# Patient Record
Sex: FEMALE | Race: BLACK OR AFRICAN AMERICAN | Employment: OTHER | URBAN - METROPOLITAN AREA
[De-identification: names, ages, dates, MRNs, and addresses within clinical notes are randomized per-mention and may not be internally consistent; named-entity substitution may affect disease eponyms.]

---

## 2017-01-21 ENCOUNTER — APPOINTMENT (OUTPATIENT)
Dept: GENERAL RADIOLOGY | Age: 77
End: 2017-01-21
Attending: EMERGENCY MEDICINE
Payer: MEDICARE

## 2017-01-21 ENCOUNTER — HOSPITAL ENCOUNTER (EMERGENCY)
Age: 77
Discharge: HOME | End: 2017-01-21
Attending: EMERGENCY MEDICINE
Payer: MEDICARE

## 2017-01-21 VITALS
HEIGHT: 63 IN | DIASTOLIC BLOOD PRESSURE: 64 MMHG | OXYGEN SATURATION: 99 % | TEMPERATURE: 99.3 F | BODY MASS INDEX: 32.78 KG/M2 | RESPIRATION RATE: 16 BRPM | WEIGHT: 185 LBS | HEART RATE: 90 BPM | SYSTOLIC BLOOD PRESSURE: 131 MMHG

## 2017-01-21 DIAGNOSIS — M25.511 PAIN IN JOINT OF RIGHT SHOULDER: Primary | ICD-10-CM

## 2017-01-21 DIAGNOSIS — Z87.39 H/O ACUTE GOUTY ARTHRITIS: ICD-10-CM

## 2017-01-21 PROCEDURE — 99283 EMERGENCY DEPT VISIT LOW MDM: CPT

## 2017-01-21 PROCEDURE — L3670 SO ACRO/CLAV CAN WEB PRE OTS: HCPCS

## 2017-01-21 PROCEDURE — 73030 X-RAY EXAM OF SHOULDER: CPT

## 2017-01-21 PROCEDURE — 74011250637 HC RX REV CODE- 250/637: Performed by: NURSE PRACTITIONER

## 2017-01-21 RX ORDER — COLCHICINE 0.6 MG/1
TABLET ORAL
Qty: 30 TAB | Refills: 0 | Status: SHIPPED | OUTPATIENT
Start: 2017-01-21 | End: 2018-09-29

## 2017-01-21 RX ORDER — HYDROCODONE BITARTRATE AND ACETAMINOPHEN 7.5; 325 MG/1; MG/1
1 TABLET ORAL
Status: COMPLETED | OUTPATIENT
Start: 2017-01-21 | End: 2017-01-21

## 2017-01-21 RX ORDER — HYDROCODONE BITARTRATE AND ACETAMINOPHEN 5; 325 MG/1; MG/1
1 TABLET ORAL
Qty: 12 TAB | Refills: 0 | Status: SHIPPED | OUTPATIENT
Start: 2017-01-21 | End: 2018-04-29

## 2017-01-21 RX ADMIN — HYDROCODONE BITARTRATE AND ACETAMINOPHEN 1 TABLET: 7.5; 325 TABLET ORAL at 11:15

## 2017-01-21 NOTE — DISCHARGE INSTRUCTIONS
Joint Pain: Care Instructions  Your Care Instructions  Many people have small aches and pains from overuse or injury to muscles and joints. Joint injuries often happen during sports or recreation, work tasks, or projects around the home. An overuse injury can happen when you put too much stress on a joint or when you do an activity that stresses the joint over and over, such as using the computer or rowing a boat. You can take action at home to help your muscles and joints get better. You should feel better in 1 to 2 weeks, but it can take 3 months or more to heal completely. Follow-up care is a key part of your treatment and safety. Be sure to make and go to all appointments, and call your doctor if you are having problems. It's also a good idea to know your test results and keep a list of the medicines you take. How can you care for yourself at home? · Do not put weight on the injured joint for at least a day or two. · For the first day or two after an injury, do not take hot showers or baths, and do not use hot packs. The heat could make swelling worse. · Put ice or a cold pack on the sore joint for 10 to 20 minutes at a time. Try to do this every 1 to 2 hours for the next 3 days (when you are awake) or until the swelling goes down. Put a thin cloth between the ice and your skin. · Wrap the injury in an elastic bandage. Do not wrap it too tightly because this can cause more swelling. · Prop up the sore joint on a pillow when you ice it or anytime you sit or lie down during the next 3 days. Try to keep it above the level of your heart. This will help reduce swelling. · Take an over-the-counter pain medicine, such as acetaminophen (Tylenol), ibuprofen (Advil, Motrin), or naproxen (Aleve). Read and follow all instructions on the label. · After 1 or 2 days of rest, begin moving the joint gently.  While the joint is still healing, you can begin to exercise using activities that do not strain or hurt the painful joint. When should you call for help? Call your doctor now or seek immediate medical care if:  · You have signs of infection, such as:  ¨ Increased pain, swelling, warmth, and redness. ¨ Red streaks leading from the joint. ¨ A fever. Watch closely for changes in your health, and be sure to contact your doctor if:  · Your movement or symptoms are not getting better after 1 to 2 weeks of home treatment. Where can you learn more? Go to http://marshall-tom.info/. Enter P205 in the search box to learn more about \"Joint Pain: Care Instructions. \"  Current as of: May 23, 2016  Content Version: 11.1  © 3316-9852 [employer]. Care instructions adapted under license by [employer] (which disclaims liability or warranty for this information). If you have questions about a medical condition or this instruction, always ask your healthcare professional. [place] [address] any warranty or liability for your use of this information. [employer] Activation    Thank you for requesting access to [employer]. Please follow the instructions below to securely access and download your online medical record. [employer] allows you to send messages to your doctor, view your test results, renew your prescriptions, schedule appointments, and more. How Do I Sign Up? 1. In your internet browser, go to [email or web address]  2. Click on the First Time User? Click Here link in the Sign In box. You will be redirect to the New Member Sign Up page. 3. Enter your [employer] Access Code exactly as it appears below. You will not need to use this code after youve completed the sign-up process. If you do not sign up before the expiration date, you must request a new code. [employer] Access Code: [email or web address]  Expires: 2017 11:48 AM (This is the date your [employer] access code will )    4.  Enter the last four digits of your Social Security Number (xxxx) and Date of Birth (consuelo/dd/yyyy) as indicated and click Submit. You will be taken to the next sign-up page. 5. Create a [employer] ID. This will be your [employer] login ID and cannot be changed, so think of one that is secure and easy to remember. 6. Create a [employer] password. You can change your password at any time. 7. Enter your Password Reset Question and Answer. This can be used at a later time if you forget your password. 8. Enter your e-mail address. You will receive e-mail notification when new information is available in [address] 9. Click Sign Up. You can now view and download portions of your medical record. 10. Click the Download Summary menu link to download a portable copy of your medical information. Additional Information    If you have questions, please visit the Frequently Asked Questions section of the [employer] website at [email or web address] Remember, [employer] is NOT to be used for urgent needs. For medical emergencies, dial 911. Gout: Care Instructions  Your Care Instructions  Gout is a form of arthritis caused by a buildup of uric acid crystals in a joint. It causes sudden attacks of pain, swelling, redness, and stiffness, usually in one joint, especially the big toe. Gout usually comes on without a cause. But it can be brought on by drinking alcohol (especially beer) or eating seafood and red meat. Taking certain medicines, such as diuretics or aspirin, also can bring on an attack of gout. Taking your medicines as prescribed and following up with your doctor regularly can help you avoid gout attacks in the future. Follow-up care is a key part of your treatment and safety. Be sure to make and go to all appointments, and call your doctor if you are having problems. Its also a good idea to know your test results and keep a list of the medicines you take. How can you care for yourself at home?   · If the joint is swollen, put ice or a cold pack on the area for 10 to 20 minutes at a time. Put a thin cloth between the ice and your skin. · Prop up the sore limb on a pillow when you ice it or anytime you sit or lie down during the next 3 days. Try to keep it above the level of your heart. This will help reduce swelling. · Rest sore joints. Avoid activities that put weight or strain on the joints for a few days. Take short rest breaks from your regular activities during the day. · Take your medicines exactly as prescribed. Call your doctor if you think you are having a problem with your medicine. · Take pain medicines exactly as directed. ¨ If the doctor gave you a prescription medicine for pain, take it as prescribed. ¨ If you are not taking a prescription pain medicine, ask your doctor if you can take an over-the-counter medicine. · Eat less seafood and red meat. · Check with your doctor before drinking alcohol. · Losing weight, if you are overweight, may help reduce attacks of gout. But do not go on a [employer]. \" Losing a lot of weight in a short amount of time can cause a gout attack. When should you call for help? Call your doctor now or seek immediate medical care if:  · You have a fever. · The joint is so painful you cannot use it. · You have sudden, unexplained swelling, redness, warmth, or severe pain in one or more joints. Watch closely for changes in your health, and be sure to contact your doctor if:  · You have joint pain. · Your symptoms get worse or are not improving after 2 or 3 days. Where can you learn more? Go to http://marshall-tom.info/. Enter K715 in the search box to learn more about \"Gout: Care Instructions. \"  Current as of: February 24, 2016  Content Version: 11.1  © 8912-1186 [employer]. Care instructions adapted under license by [employer] (which disclaims liability or warranty for this information).  If you have questions about a medical condition or this instruction, always ask your healthcare professional. [address] any warranty or liability for your use of this information.

## 2017-01-21 NOTE — ED PROVIDER NOTES
HPI Comments: Pt with history of gout and hypertension presents with chief complaint on non-traumatic pain to her right shoulder joint. Pt states that pain in left shoulder started about 4 days ago and no is also in right shoulder. Denies any recent or old trauma. Currently taking allopurinol and last treated with colchicine approximately one year ago. Pt had not taken BP meds this morning and took upon arrival to the ED. Patient is a 78 y.o. female presenting with shoulder pain. Shoulder Pain    Pertinent negatives include no numbness. Past Medical History:   Diagnosis Date    Diabetes ([address]     Hypercholesteremia     Hypertension        Past Surgical History:   Procedure Laterality Date    Hx orthopaedic  april 2013    Hx carpal tunnel release      Hx other surgical       left arm nerve surgery         Family History:   Problem Relation Age of Onset    Diabetes Other     Hypertension Other        Social History     Social History    Marital status:      Spouse name: N/A    Number of children: N/A    Years of education: N/A     Occupational History    Not on file. Social History Main Topics    Smoking status: Never Smoker    Smokeless tobacco: Not on file    Alcohol use No    Drug use: No    Sexual activity: Not on file     Other Topics Concern    Not on file     Social History Narrative         ALLERGIES: Review of patient's allergies indicates no known allergies. Review of Systems   Constitutional: Negative. Negative for chills and fever. HENT: Negative. Respiratory: Negative. Negative for cough, shortness of breath, wheezing and stridor. Cardiovascular: Negative for chest pain and palpitations. Gastrointestinal: Negative for abdominal pain. Musculoskeletal: Positive for arthralgias. Negative for joint swelling, neck pain and neck stiffness. Skin: Negative. Neurological: Negative. Negative for dizziness, weakness, numbness and headaches.    All other systems reviewed and are negative. Vitals:    01/21/17 1053   BP: (!) 192/169   Pulse: 90   Resp: 16   Temp: 99.3 °F (37.4 °C)   SpO2: 99%   Weight: 83.9 kg (185 lb)   Height: 5' 3\" (1.6 m)            Physical Exam   Constitutional: She is oriented to person, place, and time. She appears well-developed and well-nourished. HENT:   Head: Normocephalic and atraumatic. Eyes: EOM are normal. Pupils are equal, round, and reactive to light. Neck: Normal range of motion. Neck supple. Cardiovascular: Normal rate, regular rhythm and normal heart sounds. Pulmonary/Chest: Effort normal and breath sounds normal.   Musculoskeletal: She exhibits tenderness. She exhibits no edema or deformity. Anterior right shoulder tender to touch and painful with all ROM. No heat or redness. Neurological: She is alert and oriented to person, place, and time. She exhibits normal muscle tone. Coordination normal.   Skin: Skin is warm and dry. Psychiatric: She has a normal mood and affect. Her behavior is normal. Judgment and thought content normal.   Nursing note and vitals reviewed. MDM  Number of Diagnoses or Management Options  Diagnosis management comments: Reviewed xray with Dr. Bhavna Ramirez to be severe arthritis in the absence of no recent or past trauma. Due to pt's history of gout, will treat as flair with previously tolerated colchicine and low dose Norco and sling for comfort. Pt has appointment with PCP in 2 days. Amount and/or Complexity of Data Reviewed  Tests in the radiology section of CPT®: ordered and reviewed    Risk of Complications, Morbidity, and/or Mortality  Presenting problems: moderate  Diagnostic procedures: moderate  Management options: moderate    Patient Progress  Patient progress: improved    ED Course       Procedures                       Diagnosis:   1. Pain in joint of right shoulder    2.  H/O acute gouty arthritis          Disposition: home    Follow-up Information Follow up With Details Comments Contact Lynette Mckeon MD In 2 days  [facility] [address] [facility] EMERGENCY DEPT  If symptoms worsen [address]  [phone]          Patient's Medications   Start Taking    COLCHICINE 0.6 MG TABLET    Take 1 tablet by mouth Q1Hour for gout pain. NOT TO EXCEED 3 TABLETS IN 24 HOURS. HYDROCODONE-ACETAMINOPHEN (NORCO) 5-325 MG PER TABLET    Take 1 Tab by mouth every four (4) hours as needed for Pain. Max Daily Amount: 6 Tabs. Continue Taking    ASPIRIN 81 MG TABLET    Take 81 mg by mouth. ATORVASTATIN (LIPITOR) 40 MG TABLET    Take 40 mg by mouth daily. ESOMEPRAZOLE (NEXIUM) 40 MG CAPSULE    Take 40 mg by mouth daily. FEBUXOSTAT (ULORIC) 40 MG TAB TABLET    Take 40 mg by mouth daily. LISINOPRIL (PRINIVIL, ZESTRIL) 40 MG TABLET    Take 40 mg by mouth daily. METOPROLOL (LOPRESSOR) 100 MG TABLET    Take 100 mg by mouth two (2) times a day. TRAMADOL (ULTRAM) 50 MG TABLET    Take 50 mg by mouth every six (6) hours as needed. These Medications have changed    No medications on file   Stop Taking    COLCHICINE 0.6 MG TABLET    Take 1 Tab by mouth every two (2) hours as needed for Pain (stop if pain stops, you get diarrhea, and do not take more than 8 a day). HYDROCODONE-ACETAMINOPHEN (VICODIN) 5-500 MG PER TABLET    Take 1 Tab by mouth every four (4) hours as needed for Pain.

## 2018-04-27 ENCOUNTER — APPOINTMENT (OUTPATIENT)
Dept: MRI IMAGING | Age: 78
End: 2018-04-27
Attending: EMERGENCY MEDICINE
Payer: MEDICARE

## 2018-04-27 ENCOUNTER — HOSPITAL ENCOUNTER (OUTPATIENT)
Age: 78
Setting detail: OBSERVATION
LOS: 1 days | Discharge: HOME | End: 2018-04-29
Attending: EMERGENCY MEDICINE | Admitting: HOSPITALIST
Payer: MEDICARE

## 2018-04-27 ENCOUNTER — APPOINTMENT (OUTPATIENT)
Dept: GENERAL RADIOLOGY | Age: 78
End: 2018-04-27
Attending: EMERGENCY MEDICINE
Payer: MEDICARE

## 2018-04-27 ENCOUNTER — APPOINTMENT (OUTPATIENT)
Dept: CT IMAGING | Age: 78
End: 2018-04-27
Attending: EMERGENCY MEDICINE
Payer: MEDICARE

## 2018-04-27 DIAGNOSIS — F10.929 ALCOHOLIC INTOXICATION WITH COMPLICATION (HCC): Primary | ICD-10-CM

## 2018-04-27 PROBLEM — N17.9 AKI (ACUTE KIDNEY INJURY) (HCC): Status: ACTIVE | Noted: 2018-04-27

## 2018-04-27 PROBLEM — I10 HTN (HYPERTENSION): Status: ACTIVE | Noted: 2018-04-27

## 2018-04-27 PROBLEM — E78.5 HLD (HYPERLIPIDEMIA): Status: ACTIVE | Noted: 2018-04-27

## 2018-04-27 PROBLEM — E11.9 DM (DIABETES MELLITUS) (HCC): Status: ACTIVE | Noted: 2018-04-27

## 2018-04-27 PROBLEM — G45.9 TIA (TRANSIENT ISCHEMIC ATTACK): Status: ACTIVE | Noted: 2018-04-27

## 2018-04-27 LAB
ABO + RH BLD: NORMAL
ALBUMIN SERPL-MCNC: 3.6 G/DL (ref 3.4–5)
ALBUMIN/GLOB SERPL: 1.1 {RATIO} (ref 0.8–1.7)
ALP SERPL-CCNC: 61 U/L (ref 45–117)
ALT SERPL-CCNC: 31 U/L (ref 13–56)
AMMONIA PLAS-SCNC: 21 UMOL/L (ref 11–32)
AMPHET UR QL SCN: NEGATIVE
ANION GAP SERPL CALC-SCNC: 13 MMOL/L (ref 3–18)
APPEARANCE UR: CLEAR
ASPIRIN TEST, ASPIRN: 431 ARU (ref 620–672)
AST SERPL-CCNC: 37 U/L (ref 15–37)
BACTERIA URNS QL MICRO: ABNORMAL /HPF
BARBITURATES UR QL SCN: NEGATIVE
BENZODIAZ UR QL: NEGATIVE
BILIRUB SERPL-MCNC: 0.2 MG/DL (ref 0.2–1)
BILIRUB UR QL: NEGATIVE
BLOOD GROUP ANTIBODIES SERPL: NORMAL
BUN SERPL-MCNC: 40 MG/DL (ref 7–18)
BUN/CREAT SERPL: 19 (ref 12–20)
CALCIUM SERPL-MCNC: 8.6 MG/DL (ref 8.5–10.1)
CANNABINOIDS UR QL SCN: NEGATIVE
CHLORIDE SERPL-SCNC: 102 MMOL/L (ref 100–108)
CHOLEST SERPL-MCNC: 188 MG/DL
CO2 SERPL-SCNC: 26 MMOL/L (ref 21–32)
COCAINE UR QL SCN: NEGATIVE
COLOR UR: YELLOW
CREAT SERPL-MCNC: 2.16 MG/DL (ref 0.6–1.3)
EPITH CASTS URNS QL MICRO: ABNORMAL /LPF (ref 0–5)
ERYTHROCYTE [DISTWIDTH] IN BLOOD BY AUTOMATED COUNT: 14.3 % (ref 11.6–14.5)
ERYTHROCYTE [SEDIMENTATION RATE] IN BLOOD: 42 MM/HR (ref 0–30)
EST. AVERAGE GLUCOSE BLD GHB EST-MCNC: 111 MG/DL
ETHANOL SERPL-MCNC: 232 MG/DL (ref 0–3)
FIBRINOGEN PPP-MCNC: 422 MG/DL (ref 210–451)
GLOBULIN SER CALC-MCNC: 3.4 G/DL (ref 2–4)
GLUCOSE BLD STRIP.AUTO-MCNC: 67 MG/DL (ref 70–110)
GLUCOSE BLD STRIP.AUTO-MCNC: 79 MG/DL (ref 70–110)
GLUCOSE BLD STRIP.AUTO-MCNC: 96 MG/DL (ref 70–110)
GLUCOSE SERPL-MCNC: 96 MG/DL (ref 74–99)
GLUCOSE UR STRIP.AUTO-MCNC: NEGATIVE MG/DL
HBA1C MFR BLD: 5.5 % (ref 4.2–5.6)
HCT VFR BLD AUTO: 34.7 % (ref 35–45)
HDLC SERPL-MCNC: 66 MG/DL (ref 40–60)
HDLC SERPL: 2.8 {RATIO} (ref 0–5)
HDSCOM,HDSCOM: NORMAL
HGB BLD-MCNC: 11.5 G/DL (ref 12–16)
HGB UR QL STRIP: NEGATIVE
INR PPP: 1 (ref 0.8–1.2)
KETONES UR QL STRIP.AUTO: NEGATIVE MG/DL
LDLC SERPL CALC-MCNC: 98.8 MG/DL (ref 0–100)
LEUKOCYTE ESTERASE UR QL STRIP.AUTO: ABNORMAL
LIPID PROFILE,FLP: ABNORMAL
MAGNESIUM SERPL-MCNC: 2.4 MG/DL (ref 1.6–2.6)
MCH RBC QN AUTO: 32.3 PG (ref 24–34)
MCHC RBC AUTO-ENTMCNC: 33.1 G/DL (ref 31–37)
MCV RBC AUTO: 97.5 FL (ref 74–97)
METHADONE UR QL: NEGATIVE
NITRITE UR QL STRIP.AUTO: NEGATIVE
OPIATES UR QL: NEGATIVE
PCP UR QL: NEGATIVE
PH UR STRIP: 5.5 [PH] (ref 5–8)
PLATELET # BLD AUTO: 240 K/UL (ref 135–420)
PMV BLD AUTO: 10.8 FL (ref 9.2–11.8)
POTASSIUM SERPL-SCNC: 4 MMOL/L (ref 3.5–5.5)
PROT SERPL-MCNC: 7 G/DL (ref 6.4–8.2)
PROT UR STRIP-MCNC: NEGATIVE MG/DL
PROTHROMBIN TIME: 12.7 SEC (ref 11.5–15.2)
RBC # BLD AUTO: 3.56 M/UL (ref 4.2–5.3)
RBC #/AREA URNS HPF: ABNORMAL /HPF (ref 0–5)
SODIUM SERPL-SCNC: 141 MMOL/L (ref 136–145)
SP GR UR REFRACTOMETRY: 1.01 (ref 1–1.03)
SPECIMEN EXP DATE BLD: NORMAL
T3FREE SERPL-MCNC: 2.7 PG/ML (ref 2.18–3.98)
T4 FREE SERPL-MCNC: 0.9 NG/DL (ref 0.7–1.5)
THROMBIN TIME: 15.6 SECS (ref 13.8–18.2)
TRIGL SERPL-MCNC: 116 MG/DL (ref ?–150)
TSH SERPL DL<=0.05 MIU/L-ACNC: 0.61 UIU/ML (ref 0.36–3.74)
UROBILINOGEN UR QL STRIP.AUTO: 1 EU/DL (ref 0.2–1)
VIT B12 SERPL-MCNC: 513 PG/ML (ref 211–911)
VLDLC SERPL CALC-MCNC: 23.2 MG/DL
WBC # BLD AUTO: 4.7 K/UL (ref 4.6–13.2)
WBC URNS QL MICRO: ABNORMAL /HPF (ref 0–4)

## 2018-04-27 PROCEDURE — 85610 PROTHROMBIN TIME: CPT | Performed by: EMERGENCY MEDICINE

## 2018-04-27 PROCEDURE — 94762 N-INVAS EAR/PLS OXIMTRY CONT: CPT

## 2018-04-27 PROCEDURE — 74011250637 HC RX REV CODE- 250/637: Performed by: HOSPITALIST

## 2018-04-27 PROCEDURE — 85384 FIBRINOGEN ACTIVITY: CPT | Performed by: EMERGENCY MEDICINE

## 2018-04-27 PROCEDURE — 85576 BLOOD PLATELET AGGREGATION: CPT | Performed by: EMERGENCY MEDICINE

## 2018-04-27 PROCEDURE — 86900 BLOOD TYPING SEROLOGIC ABO: CPT | Performed by: EMERGENCY MEDICINE

## 2018-04-27 PROCEDURE — 74011000250 HC RX REV CODE- 250: Performed by: HOSPITALIST

## 2018-04-27 PROCEDURE — 96361 HYDRATE IV INFUSION ADD-ON: CPT

## 2018-04-27 PROCEDURE — 96360 HYDRATION IV INFUSION INIT: CPT

## 2018-04-27 PROCEDURE — 82607 VITAMIN B-12: CPT | Performed by: HOSPITALIST

## 2018-04-27 PROCEDURE — 82962 GLUCOSE BLOOD TEST: CPT

## 2018-04-27 PROCEDURE — 99285 EMERGENCY DEPT VISIT HI MDM: CPT

## 2018-04-27 PROCEDURE — 71045 X-RAY EXAM CHEST 1 VIEW: CPT

## 2018-04-27 PROCEDURE — 80053 COMPREHEN METABOLIC PANEL: CPT | Performed by: EMERGENCY MEDICINE

## 2018-04-27 PROCEDURE — 99218 HC RM OBSERVATION: CPT

## 2018-04-27 PROCEDURE — 81001 URINALYSIS AUTO W/SCOPE: CPT | Performed by: EMERGENCY MEDICINE

## 2018-04-27 PROCEDURE — 80307 DRUG TEST PRSMV CHEM ANLYZR: CPT | Performed by: EMERGENCY MEDICINE

## 2018-04-27 PROCEDURE — 74011250637 HC RX REV CODE- 250/637: Performed by: EMERGENCY MEDICINE

## 2018-04-27 PROCEDURE — 74011250636 HC RX REV CODE- 250/636: Performed by: HOSPITALIST

## 2018-04-27 PROCEDURE — 85652 RBC SED RATE AUTOMATED: CPT | Performed by: HOSPITALIST

## 2018-04-27 PROCEDURE — 74011250636 HC RX REV CODE- 250/636: Performed by: EMERGENCY MEDICINE

## 2018-04-27 PROCEDURE — 84439 ASSAY OF FREE THYROXINE: CPT | Performed by: HOSPITALIST

## 2018-04-27 PROCEDURE — 70547 MR ANGIOGRAPHY NECK W/O DYE: CPT

## 2018-04-27 PROCEDURE — 36415 COLL VENOUS BLD VENIPUNCTURE: CPT | Performed by: HOSPITALIST

## 2018-04-27 PROCEDURE — 93005 ELECTROCARDIOGRAM TRACING: CPT

## 2018-04-27 PROCEDURE — 86141 C-REACTIVE PROTEIN HS: CPT | Performed by: HOSPITALIST

## 2018-04-27 PROCEDURE — 85670 THROMBIN TIME PLASMA: CPT | Performed by: EMERGENCY MEDICINE

## 2018-04-27 PROCEDURE — 82140 ASSAY OF AMMONIA: CPT | Performed by: HOSPITALIST

## 2018-04-27 PROCEDURE — 70450 CT HEAD/BRAIN W/O DYE: CPT

## 2018-04-27 PROCEDURE — 83735 ASSAY OF MAGNESIUM: CPT | Performed by: HOSPITALIST

## 2018-04-27 PROCEDURE — 83036 HEMOGLOBIN GLYCOSYLATED A1C: CPT | Performed by: HOSPITALIST

## 2018-04-27 PROCEDURE — 70551 MRI BRAIN STEM W/O DYE: CPT

## 2018-04-27 PROCEDURE — 84443 ASSAY THYROID STIM HORMONE: CPT | Performed by: HOSPITALIST

## 2018-04-27 PROCEDURE — 85027 COMPLETE CBC AUTOMATED: CPT | Performed by: EMERGENCY MEDICINE

## 2018-04-27 PROCEDURE — 84481 FREE ASSAY (FT-3): CPT | Performed by: HOSPITALIST

## 2018-04-27 PROCEDURE — 70544 MR ANGIOGRAPHY HEAD W/O DYE: CPT

## 2018-04-27 PROCEDURE — 80061 LIPID PANEL: CPT | Performed by: HOSPITALIST

## 2018-04-27 PROCEDURE — 96372 THER/PROPH/DIAG INJ SC/IM: CPT

## 2018-04-27 RX ORDER — ACETAMINOPHEN 325 MG/1
650 TABLET ORAL
Status: DISCONTINUED | OUTPATIENT
Start: 2018-04-27 | End: 2018-04-29 | Stop reason: HOSPADM

## 2018-04-27 RX ORDER — AMOXICILLIN 250 MG
2 CAPSULE ORAL
Status: DISCONTINUED | OUTPATIENT
Start: 2018-04-27 | End: 2018-04-29 | Stop reason: HOSPADM

## 2018-04-27 RX ORDER — SODIUM CHLORIDE 0.9 % (FLUSH) 0.9 %
5-10 SYRINGE (ML) INJECTION EVERY 8 HOURS
Status: DISCONTINUED | OUTPATIENT
Start: 2018-04-27 | End: 2018-04-27

## 2018-04-27 RX ORDER — LORAZEPAM 2 MG/ML
3 INJECTION INTRAMUSCULAR
Status: DISCONTINUED | OUTPATIENT
Start: 2018-04-27 | End: 2018-04-29 | Stop reason: HOSPADM

## 2018-04-27 RX ORDER — ONDANSETRON 2 MG/ML
4 INJECTION INTRAMUSCULAR; INTRAVENOUS
Status: DISCONTINUED | OUTPATIENT
Start: 2018-04-27 | End: 2018-04-27 | Stop reason: SDUPTHER

## 2018-04-27 RX ORDER — ACETAMINOPHEN 650 MG/1
650 SUPPOSITORY RECTAL
Status: DISCONTINUED | OUTPATIENT
Start: 2018-04-27 | End: 2018-04-29 | Stop reason: HOSPADM

## 2018-04-27 RX ORDER — SODIUM CHLORIDE 0.9 % (FLUSH) 0.9 %
5-10 SYRINGE (ML) INJECTION AS NEEDED
Status: DISCONTINUED | OUTPATIENT
Start: 2018-04-27 | End: 2018-04-29 | Stop reason: HOSPADM

## 2018-04-27 RX ORDER — LORAZEPAM 1 MG/1
1 TABLET ORAL
Status: DISCONTINUED | OUTPATIENT
Start: 2018-04-27 | End: 2018-04-29 | Stop reason: HOSPADM

## 2018-04-27 RX ORDER — THERA TABS 400 MCG
1 TAB ORAL DAILY
Status: DISCONTINUED | OUTPATIENT
Start: 2018-04-28 | End: 2018-04-29 | Stop reason: HOSPADM

## 2018-04-27 RX ORDER — LORAZEPAM 2 MG/ML
2 INJECTION INTRAMUSCULAR
Status: DISCONTINUED | OUTPATIENT
Start: 2018-04-27 | End: 2018-04-29 | Stop reason: HOSPADM

## 2018-04-27 RX ORDER — GUAIFENESIN 100 MG/5ML
81 LIQUID (ML) ORAL DAILY
Status: DISCONTINUED | OUTPATIENT
Start: 2018-04-28 | End: 2018-04-29 | Stop reason: HOSPADM

## 2018-04-27 RX ORDER — LORAZEPAM 1 MG/1
2 TABLET ORAL
Status: DISCONTINUED | OUTPATIENT
Start: 2018-04-27 | End: 2018-04-29 | Stop reason: HOSPADM

## 2018-04-27 RX ORDER — LORAZEPAM 2 MG/ML
1 INJECTION INTRAMUSCULAR
Status: DISCONTINUED | OUTPATIENT
Start: 2018-04-27 | End: 2018-04-29 | Stop reason: HOSPADM

## 2018-04-27 RX ORDER — INSULIN LISPRO 100 [IU]/ML
INJECTION, SOLUTION INTRAVENOUS; SUBCUTANEOUS
Status: DISCONTINUED | OUTPATIENT
Start: 2018-04-27 | End: 2018-04-29 | Stop reason: HOSPADM

## 2018-04-27 RX ORDER — MAGNESIUM SULFATE 100 %
4 CRYSTALS MISCELLANEOUS AS NEEDED
Status: DISCONTINUED | OUTPATIENT
Start: 2018-04-27 | End: 2018-04-29 | Stop reason: HOSPADM

## 2018-04-27 RX ORDER — GUAIFENESIN 100 MG/5ML
324 LIQUID (ML) ORAL
Status: COMPLETED | OUTPATIENT
Start: 2018-04-27 | End: 2018-04-27

## 2018-04-27 RX ORDER — ASPIRIN 325 MG/1
100 TABLET, FILM COATED ORAL DAILY
Status: DISCONTINUED | OUTPATIENT
Start: 2018-04-28 | End: 2018-04-29 | Stop reason: HOSPADM

## 2018-04-27 RX ORDER — METOPROLOL TARTRATE 50 MG/1
100 TABLET ORAL 2 TIMES DAILY
Status: DISCONTINUED | OUTPATIENT
Start: 2018-04-27 | End: 2018-04-29 | Stop reason: HOSPADM

## 2018-04-27 RX ORDER — HEPARIN SODIUM 5000 [USP'U]/ML
5000 INJECTION, SOLUTION INTRAVENOUS; SUBCUTANEOUS EVERY 8 HOURS
Status: DISCONTINUED | OUTPATIENT
Start: 2018-04-27 | End: 2018-04-29 | Stop reason: HOSPADM

## 2018-04-27 RX ORDER — ATORVASTATIN CALCIUM 40 MG/1
80 TABLET, FILM COATED ORAL DAILY
Status: DISCONTINUED | OUTPATIENT
Start: 2018-04-28 | End: 2018-04-29 | Stop reason: HOSPADM

## 2018-04-27 RX ORDER — FOLIC ACID 1 MG/1
1 TABLET ORAL DAILY
Status: DISCONTINUED | OUTPATIENT
Start: 2018-04-28 | End: 2018-04-29 | Stop reason: HOSPADM

## 2018-04-27 RX ORDER — DEXTROSE 50 % IN WATER (D50W) INTRAVENOUS SYRINGE
25-50 AS NEEDED
Status: DISCONTINUED | OUTPATIENT
Start: 2018-04-27 | End: 2018-04-29 | Stop reason: HOSPADM

## 2018-04-27 RX ORDER — ONDANSETRON 2 MG/ML
2 INJECTION INTRAMUSCULAR; INTRAVENOUS
Status: DISCONTINUED | OUTPATIENT
Start: 2018-04-27 | End: 2018-04-29 | Stop reason: HOSPADM

## 2018-04-27 RX ADMIN — HEPARIN SODIUM 5000 UNITS: 5000 INJECTION, SOLUTION INTRAVENOUS; SUBCUTANEOUS at 23:30

## 2018-04-27 RX ADMIN — DOCUSATE SODIUM AND SENNOSIDES 2 TABLET: 8.6; 5 TABLET, FILM COATED ORAL at 23:30

## 2018-04-27 RX ADMIN — Medication 10 ML: at 23:31

## 2018-04-27 RX ADMIN — METOPROLOL TARTRATE 100 MG: 50 TABLET ORAL at 23:30

## 2018-04-27 RX ADMIN — FOLIC ACID: 5 INJECTION, SOLUTION INTRAMUSCULAR; INTRAVENOUS; SUBCUTANEOUS at 23:30

## 2018-04-27 RX ADMIN — SODIUM CHLORIDE 500 ML: 9 INJECTION, SOLUTION INTRAVENOUS at 16:49

## 2018-04-27 RX ADMIN — ASPIRIN 81 MG 324 MG: 81 TABLET ORAL at 16:48

## 2018-04-27 NOTE — IP AVS SNAPSHOT
Oksana Campbell 
 
 
 [address] [place] [address] 
[phone] Patient: Nevaeh Mon MRN: [id number] OLV:[id number] A check ria indicates which time of day the medication should be taken. My Medications CONTINUE taking these medications Instructions Each Dose to Equal  
 Morning Noon Evening Bedtime  
 aspirin 81 mg tablet Your last dose was: Your next dose is: Take 81 mg by mouth. 81 mg  
    
   
   
   
  
 colchicine 0.6 mg tablet Your last dose was: Your next dose is: Take 1 tablet by mouth Q1Hour for gout pain. NOT TO EXCEED 3 TABLETS IN 24 HOURS. LIPITOR 40 mg tablet Generic drug:  atorvastatin Your last dose was: Your next dose is: Take 40 mg by mouth daily. 40 mg  
    
   
   
   
  
 metoprolol tartrate 100 mg IR tablet Commonly known as:  LOPRESSOR Your last dose was: Your next dose is: Take 100 mg by mouth two (2) times a day. 100 mg NexIUM 40 mg capsule Generic drug:  esomeprazole Your last dose was: Your next dose is: Take 40 mg by mouth daily. 40 mg  
    
   
   
   
  
 ULORIC 40 mg Tab tablet Generic drug:  febuxostat Your last dose was: Your next dose is: Take 40 mg by mouth daily. 40 mg  
    
   
   
   
  
  
STOP taking these medications HYDROcodone-acetaminophen 5-325 mg per tablet Commonly known as:  NORCO  
   
  
 lisinopril 40 mg tablet Commonly known as:  PRINIVIL, ZESTRIL  
   
  
 traMADol 50 mg tablet Commonly known as:  Candy Captain

## 2018-04-27 NOTE — H&P
Medicine History and Physical    Patient: Iggy Alonzo   Age:  78 y.o. Assessment   Principal Problem:    TIA (transient ischemic attack) (4/27/2018)    Active Problems:    DM (diabetes mellitus) ([facility] (4/27/2018)      HTN (hypertension) (4/27/2018)      HLD (hyperlipidemia) (4/27/2018)      Alcohol intoxication ([facility] (4/27/2018)      MINAL (acute [facility]) ([facility] (4/27/2018)          Plan     1)  Possible TIA   - more likely syncope from alcohol intoxication or simply falling asleep   - MRIs pending, will get echo   - if echo ok and MRIs negative may dc in pm tomorrow   - CVA protocol   - IVF   - carotids    2)  DM   - SSI        DISPO  -Pt to be admitted  at this time for reasons addressed above, continued hospitalization for ongoing assessment and treatment indicated     Anticipated Date of Discharge: 1 day  Anticipated Disposition (home, SNF) : home    Chief Complaint:   Chief Complaint   Patient presents with    Extremity Weakness         HPI:   Iggy Alonzo is a 78y.o. year old female who presents with  Alcohol intoxication. PAtient states neighbor found her in her car passed out in the driveway and called ems. Patient states for last 2 months she has been drinking 2 drinks a day although I suspect much more. She was apparently on her way to [employer] store when she passed out. There is some mention of facial droop and LLE weakness by EMS however no symptoms on arrival and patient doesn't claim anything but chronic weakness requiring cane 2/2 arthritis and knee replacement. She will be observed for CVA r/o. Review of Systems - positive responses in bold type   Constitutional: Negative for fever, chills, diaphoresis and unexpected weight change. HENT: Negative for ear pain, congestion, sore throat, rhinorrhea, drooling, trouble swallowing, neck pain and tinnitus. Eyes: Negative for photophobia, pain, redness and visual disturbance.    Respiratory: negative for shortness of breath, cough, choking, chest tightness, wheezing or stridor. Cardiovascular: Negative for chest pain, palpitations and leg swelling. Gastrointestinal: Negative for nausea, vomiting, abdominal pain, diarrhea, constipation, blood in stool, abdominal distention and anal bleeding. Genitourinary: Negative for dysuria, urgency, frequency, hematuria, flank pain and difficulty urinating. Musculoskeletal: Negative for back pain and arthralgias. Skin: Negative for color change, rash and wound. Neurological: Negative for dizziness, seizures, syncope, speech difficulty, light-headedness or headaches. Hematological: Does not bruise/bleed easily. Psychiatric/Behavioral: Negative for suicidal ideas, hallucinations, behavioral problems, self-injury or agitation       Past Medical History:  Past Medical History:   Diagnosis Date    Diabetes ([facility]     Hypercholesteremia     Hypertension        Past Surgical History:  Past Surgical History:   Procedure Laterality Date    HX CARPAL TUNNEL RELEASE      HX ORTHOPAEDIC  april 2013    HX OTHER SURGICAL      left arm nerve surgery       Family History:  Family History   Problem Relation Age of Onset    Diabetes Other     Hypertension Other        Social History:  Social History     Social History    Marital status:      Spouse name: N/A    Number of children: N/A    Years of education: N/A     Social History Main Topics    Smoking status: Never Smoker    Smokeless tobacco: Never Used    Alcohol use Yes    Drug use: No    Sexual activity: Not Asked     Other Topics Concern    None     Social History Narrative       Home Medications:  Prior to Admission medications    Medication Sig Start Date End Date Taking? Authorizing Provider   colchicine 0.6 mg tablet Take 1 tablet by mouth Q1Hour for gout pain. NOT TO EXCEED 3 TABLETS IN 24 HOURS. 1/21/17   Benjy Hoyt.  Mar Gonzalez NP   HYDROcodone-acetaminophen (NORCO) 5-325 mg per tablet Take 1 Tab by mouth every four (4) hours as needed for Pain. Max Daily Amount: 6 Tabs. 1/21/17   Nora Flores. Dru Mora NP   metoprolol (LOPRESSOR) 100 mg tablet Take 100 mg by mouth two (2) times a day. Jl Cotton MD   lisinopril (PRINIVIL, ZESTRIL) 40 mg tablet Take 40 mg by mouth daily. Jl Ctoton MD   esomeprazole (NEXIUM) 40 mg capsule Take 40 mg by mouth daily. Jl Cotton MD   atorvastatin (LIPITOR) 40 mg tablet Take 40 mg by mouth daily. Jl Cotton MD   aspirin 81 mg tablet Take 81 mg by mouth. Jl Cotton MD   traMADol (ULTRAM) 50 mg tablet Take 50 mg by mouth every six (6) hours as needed. Jl Cotton MD   febuxostat (ULORIC) 40 mg Tab tablet Take 40 mg by mouth daily. Jl Cotton MD       Allergies:  No Known Allergies        Physical Exam:     Visit Vitals    /54    Pulse 95    Temp 98.5 °F (36.9 °C)    Resp 19    SpO2 98%       Physical Exam:  General appearance: alert, cooperative, no distress, appears stated age  Head: Normocephalic, without obvious abnormality, atraumatic  Neck: supple, trachea midline  Lungs: clear to auscultation bilaterally  Heart: regular rate and rhythm, S1, S2 normal, no murmur, click, rub or gallop  Abdomen: soft, non-tender.  Bowel sounds normal. No masses,  no organomegaly  Extremities: extremities normal, atraumatic, no cyanosis or edema  Skin: Skin color, texture, turgor normal. No rashes or lesions  Neurologic: Grossly normal, mild b/l LE weakness    Intake and Output:  Current Shift:     Last three shifts:       Lab/Data Reviewed:  CMP:   Lab Results   Component Value Date/Time     04/27/2018 03:05 PM    K 4.0 04/27/2018 03:05 PM     04/27/2018 03:05 PM    CO2 26 04/27/2018 03:05 PM    AGAP 13 04/27/2018 03:05 PM    GLU 96 04/27/2018 03:05 PM    BUN 40 (H) 04/27/2018 03:05 PM    CREA 2.16 (H) 04/27/2018 03:05 PM    GFRAA 27 (L) 04/27/2018 03:05 PM    GFRNA 22 (L) 04/27/2018 03:05 PM    CA 8.6 04/27/2018 03:05 PM    ALB 3.6 04/27/2018 03:05 PM    TP 7.0 04/27/2018 03:05 PM GLOB 3.4 04/27/2018 03:05 PM    AGRAT 1.1 04/27/2018 03:05 PM    SGOT 37 04/27/2018 03:05 PM    ALT 31 04/27/2018 03:05 PM     CBC:   Lab Results   Component Value Date/Time    WBC 4.7 04/27/2018 03:05 PM    HGB 11.5 (L) 04/27/2018 03:05 PM    HCT 34.7 (L) 04/27/2018 03:05 PM     04/27/2018 03:05 PM     All Cardiac Markers in the last 24 hours: No results found for: CPK, CK, CKMMB, CKMB, RCK3, CKMBT, CKNDX, CKND1, KYRA, TROPT, TROIQ, CHEO, TROPT, TNIPOC, BNP, BNPP    Ena Ptael MD    April 27, 2018

## 2018-04-27 NOTE — ED TRIAGE NOTES
Patient was found in car with windows up, unknown how long patient was in car, patient with possible left side weakness, patient seen by Dr Corina Romero on rescue stretcher, Code S upgraded to level 2, patient to CT on rescue stretcher with RN x2

## 2018-04-27 NOTE — IP AVS SNAPSHOT
[address] [address] 
[phone] Patient: Arnie Melendrez MRN: [id number] PGL:[id number] About your hospitalization You were admitted on:  April 27, 2018 You last received care in the:  [address] NEURO [facility] You were discharged on:  April 29, 2018 Why you were hospitalized Your primary diagnosis was:  Tia (Transient Ischemic Attack) Your diagnoses also included:  Dm (Diabetes Mellitus) (Hcc), Htn (Hypertension), Hld (Hyperlipidemia), Alcohol Intoxication (Hcc), Ramone (Acute Kidney Injury) (Hcc) Follow-up Information Follow up With Details Comments Contact Info Jozef Boone MD In 1 week follow up with your PCP within 1 week [id number] [address] [address] 
[phone] Discharge Orders None A check ria indicates which time of day the medication should be taken. My Medications CONTINUE taking these medications Instructions Each Dose to Equal  
 Morning Noon Evening Bedtime  
 aspirin 81 mg tablet Your last dose was: Your next dose is: Take 81 mg by mouth. 81 mg  
    
   
   
   
  
 colchicine 0.6 mg tablet Your last dose was: Your next dose is: Take 1 tablet by mouth Q1Hour for gout pain. NOT TO EXCEED 3 TABLETS IN 24 HOURS. LIPITOR 40 mg tablet Generic drug:  atorvastatin Your last dose was: Your next dose is: Take 40 mg by mouth daily. 40 mg  
    
   
   
   
  
 metoprolol tartrate 100 mg IR tablet Commonly known as:  LOPRESSOR Your last dose was: Your next dose is: Take 100 mg by mouth two (2) times a day. 100 mg NexIUM 40 mg capsule Generic drug:  esomeprazole Your last dose was: Your next dose is: Take 40 mg by mouth daily.   
 40 mg  
    
   
   
   
  
 ULORIC 40 mg Tab tablet Generic drug:  febuxostat Your last dose was: Your next dose is: Take 40 mg by mouth daily. 40 mg  
    
   
   
   
  
  
STOP taking these medications HYDROcodone-acetaminophen 5-325 mg per tablet Commonly known as:  NORCO  
   
  
 lisinopril 40 mg tablet Commonly known as:  PRINIVIL, ZESTRIL  
   
  
 traMADol 50 mg tablet Commonly known as:  ULTRAM  
   
  
  
  
  
Discharge Instructions DISCHARGE SUMMARY from Nurse PATIENT INSTRUCTIONS: 
 
 
F-face looks uneven A-arms unable to move or move unevenly S-speech slurred or non-existent T-time-call 911 as soon as signs and symptoms begin-DO NOT go Back to bed or wait to see if you get better-TIME IS BRAIN. Warning Signs of HEART ATTACK Call 911 if you have these symptoms: 
? Chest discomfort. Most heart attacks involve discomfort in the center of the chest that lasts more than a few minutes, or that goes away and comes back. It can feel like uncomfortable pressure, squeezing, fullness, or pain. ? Discomfort in other areas of the upper body. Symptoms can include pain or discomfort in one or both arms, the back, neck, jaw, or stomach. ? Shortness of breath with or without chest discomfort. ? Other signs may include breaking out in a cold sweat, nausea, or lightheadedness. Don't wait more than five minutes to call [address]! Fast action can save your life. Calling 911 is almost always the fastest way to get lifesaving treatment. Emergency Medical Services staff can begin treatment when they arrive  up to an hour sooner than if someone gets to the hospital by car. The discharge information has been reviewed with the patient. The patient verbalized understanding. Discharge medications reviewed with the patient and appropriate educational materials and side effects teaching were provided. ___________________________________________________________________________________________________________________________________ Transient Ischemic Attack: Care Instructions Your Care Instructions A transient ischemic attack (TIA) is when blood flow to a part of your brain is blocked for a short time. A TIA is like a stroke but usually lasts only a few minutes. A TIA does not cause lasting brain damage. Any vision problems, slurred speech, or other symptoms usually go away in 10 to 20 minutes. But they may last for up to 24 hours. TIAs are often warning signs of a stroke. Some people who have a TIA may have a stroke in the future. A stroke can cause symptoms like those of a TIA. But a stroke causes lasting damage to your brain. You can take steps to help prevent a stroke. One thing you can do is get early treatment. If you have other new symptoms, or if your symptoms do not get better, go back to the emergency room or call your doctor right away. Getting treatment right away may prevent long-term brain damage caused by a stroke. The doctor has checked you carefully, but problems can develop later. If you notice any problems or new symptoms, get medical treatment right away. Follow-up care is a key part of your treatment and safety. Be sure to make and go to all appointments, and call your doctor if you are having problems. It's also a good idea to know your test results and keep a list of the medicines you take. How can you care for yourself at home? Medicines ? · Be safe with medicines. Take your medicines exactly as prescribed. Call your doctor if you think you are having a problem with your medicine.   
? · If you take a blood thinner, such as aspirin, be sure you get instructions about how to take your medicine safely. Blood thinners can cause serious bleeding problems. ? · Call your doctor if you are not able to take your medicines for any reason. ? · Do not take any over-the-counter medicines or herbal products without talking to your doctor first.  
? · If you take birth control pills or hormone therapy, talk to your doctor. Ask if these treatments are right for you. ? Lifestyle changes ? · Do not smoke. If you need help quitting, talk to your doctor about stop-smoking programs and medicines. ? · Be active. If your doctor recommends it, get more exercise. Walking is a good choice. Bit by bit, increase the amount you walk every day. Try for at least 30 minutes on most days of the week. You also may want to swim, bike, or do other activities. ? · Eat heart-healthy foods. These include fruits, vegetables, high-fiber foods, fish, and foods that are low in sodium, saturated fat, and trans fat. ? · Stay at a healthy weight. Lose weight if you need to.  
? · Limit alcohol to 2 drinks a day for men and 1 drink a day for women. ?Staying healthy ? · Manage other health problems such as diabetes, high blood pressure, and high cholesterol. ? · Get the flu vaccine every year. When should you call for help? Call 911 anytime you think you may need emergency care. For example, call if: 
? · You have new or worse symptoms of a stroke. These may include: 
¨ Sudden numbness, tingling, weakness, or loss of movement in your face, arm, or leg, especially on only one side of your body. ¨ Sudden vision changes. ¨ Sudden trouble speaking. ¨ Sudden confusion or trouble understanding simple statements. ¨ Sudden problems with walking or balance. ¨ A sudden, severe headache that is different from past headaches. Call 911 even if these symptoms go away in a few minutes. ? · You feel like you are having another TIA. ?Watch closely for changes in your health, and be sure to contact your doctor if you have any problems. Where can you learn more? Go to http://marshall-tom.info/. Enter [id number] in the search box to learn more about \"Transient Ischemic Attack: Care Instructions. \" Current as of: March 20, 2017 Content Version: 11.4 © 9470-9839 [employer]. Care instructions adapted under license by [employer] (which disclaims liability or warranty for this information). If you have questions about a medical condition or this instruction, always ask your healthcare professional. [address] any warranty or liability for your use of this information. Patient armband removed and shredded Introducing [facility]! Iva Mercedes introduces Optisort patient portal. Now you can access parts of your medical record, email your doctor's office, and request medication refills online. 1. In your internet browser, go to [email or web address] 2. Click on the First Time User? Click Here link in the Sign In box. You will see the New Member Sign Up page. 3. Enter your Optisort Access Code exactly as it appears below. You will not need to use this code after youve completed the sign-up process. If you do not sign up before the expiration date, you must request a new code. · Optisort Access Code: [email or web address] Expires: 7/26/2018  4:15 PM 
 
4. Enter the last four digits of your Social Security Number (xxxx) and Date of Birth (mm/dd/yyyy) as indicated and click Submit. You will be taken to the next sign-up page. 5. Create a Optisort ID. This will be your Optisort login ID and cannot be changed, so think of one that is secure and easy to remember. 6. Create a Optisort password. You can change your password at any time. 7. Enter your Password Reset Question and Answer. This can be used at a later time if you forget your password. 8. Enter your e-mail address. You will receive e-mail notification when new information is available in [address] 9. Click Sign Up. You can now view and download portions of your medical record. 10. Click the Download Summary menu link to download a portable copy of your medical information. If you have questions, please visit the Frequently Asked Questions section of the [employer] website. Remember, [employer] is NOT to be used for urgent needs. For medical emergencies, dial 911. Now available from your iPhone and Android! Introducing Bong Pickard As a [facility] patient, I wanted to make you aware of our electronic visit tool called Bong Pickard. [facility] 24/7 allows you to connect within minutes with a medical provider 24 hours a day, seven days a week via a mobile device or tablet or logging into a secure website from your computer. You can access Bong Pickard from anywhere in the [place]. A virtual visit might be right for you when you have a simple condition and feel like you just dont want to get out of bed, or cant get away from work for an appointment, when your regular [facility] provider is not available (evenings, weekends or holidays), or when youre out of town and need minor care. Electronic visits cost only $49 and if the [facility] 24/7 provider determines a prescription is needed to treat your condition, one can be electronically transmitted to a nearby pharmacy*. Please take a moment to enroll today if you have not already done so. The enrollment process is free and takes just a few minutes. To enroll, please download the [employer] 24/7 anuradha to your tablet or phone, or visit [email or web address] to enroll on your computer.    
And, as an [address] [place] [address] patient with a [employer] account, the results of your visits will be scanned into your electronic medical record and your primary care provider will be able to view the scanned results. We urge you to continue to see your regular [facility] provider for your ongoing medical care. And while your primary care provider may not be the one available when you seek a [employer]acefin virtual visit, the peace of mind you get from getting a real diagnosis real time can be priceless. For more information on [employer], view our Frequently Asked Questions (FAQs) at [email or web address]. Sincerely, 
 
Nahed Patterson MD 
Chief Medical Officer [address] *:  certain medications cannot be prescribed via [employer] Unresulted Labs-Please follow up with your PCP about these lab tests Order Current Status CRP, HIGH SENSITIVITY In process Providers Seen During Your Hospitalization Provider Specialty Primary office phone Jessica Lorenzo DO Emergency Medicine [phone] Nani Tapia MD Internal Medicine [phone] Mark Michael MD Internal Medicine [phone] Your Primary Care Physician (PCP) Primary Care Physician Office Phone Office Fax John Sauceda, [address] [place] [address] [phone] You are allergic to the following No active allergies Recent Documentation OB Status Smoking Status Hysterectomy Never Smoker Emergency Contacts Name Discharge Info Relation Home Work Mobile Lemons,Rohith DISCHARGE CAREGIVER [3] Child [2] [phone] Patient Belongings The following personal items are in your possession at time of discharge: 
  Dental Appliances: None  Visual Aid: None      Home Medications: None   Jewelry: Ring (2)  Clothing: Footwear, Jacket/Coat, Shirt, Shorts, Undergarments, With patient    Other Valuables: None Please provide this summary of care documentation to your next provider.  
  
  
 
  
Signatures-by signing, you are acknowledging that this After Visit Summary has been reviewed with you and you have received a copy. Patient Signature:  ____________________________________________________________ Date:  ____________________________________________________________  
  
Nenita Spare Provider Signature:  ____________________________________________________________ Date:  ____________________________________________________________

## 2018-04-27 NOTE — ED NOTES
Patient returned to [address] [place] [address] from [address], patient states that she sleeps in her car every now and then, she was nipping on some [employer] and must have fallen asleep

## 2018-04-27 NOTE — ED PROVIDER NOTES
EMERGENCY DEPARTMENT HISTORY AND PHYSICAL EXAM    3:05 PM      Date: 4/27/2018  Patient Name: Norma Larios    History of Presenting Illness     Chief Complaint   Patient presents with    Extremity Weakness         History Provided By: EMS    Chief Complaint: weakness  Duration:  unknown  Timing:  unknown  Location: N/A  Modifying Factors: none  Associated Symptoms: facial droop, intermittent slurred speech, altered mental status---all per EMS      Additional History (Context): Norma Larios, a 78 y.o. female with the noted PMHx and SHx presents to the ED via EMS for left sided weakness, facial droop, altered mental status and intermittently slurred speech x unknown duration. Neighbors found pt sitting in her car which was parked sideways in her driveway with windows rolled up. EMS reports that pt had been sitting in the car since she returned from a store earlier this morning but the exact time is unknown. Pt does not have h/o CVAs. Hx is otherwise limited by pt's acuity of condition and altered mental status. PCP: Lexie Olson MD (Inactive)    Current Outpatient Prescriptions   Medication Sig Dispense Refill    metoprolol (LOPRESSOR) 100 mg tablet Take 100 mg by mouth two (2) times a day.  aspirin 81 mg tablet Take 81 mg by mouth.  colchicine 0.6 mg tablet Take 1 tablet by mouth Q1Hour for gout pain. NOT TO EXCEED 3 TABLETS IN 24 HOURS. 30 Tab 0    esomeprazole (NEXIUM) 40 mg capsule Take 40 mg by mouth daily.  atorvastatin (LIPITOR) 40 mg tablet Take 40 mg by mouth daily.  febuxostat (ULORIC) 40 mg Tab tablet Take 40 mg by mouth daily.          Past History     Past Medical History:  Past Medical History:   Diagnosis Date    Diabetes ([address]     Hypercholesteremia     Hypertension        Past Surgical History:  Past Surgical History:   Procedure Laterality Date    HX CARPAL TUNNEL RELEASE      HX ORTHOPAEDIC  april 2013    HX OTHER SURGICAL      left arm nerve surgery       Family History:  Family History   Problem Relation Age of Onset    Diabetes Other     Hypertension Other        Social History:  Social History   Substance Use Topics    Smoking status: Never Smoker    Smokeless tobacco: Never Used    Alcohol use Yes       Allergies:  No Known Allergies      Review of Systems       Review of Systems   Unable to perform ROS: Acuity of condition         Physical Exam     Visit Vitals    /74 (BP 1 Location: Right arm, BP Patient Position: At rest)    Pulse 71    Temp 98.2 °F (36.8 °C)    Resp 16    Ht 5' 4\" (1.626 m)    Wt 81.2 kg (179 lb 0.2 oz)    SpO2 100%    BMI 30.73 kg/m2         Physical Exam   Constitutional: She is oriented to person, place, and time. She appears well-developed and well-nourished. No distress. HENT:   Head: Normocephalic and atraumatic. Mouth/Throat: Oropharynx is clear and moist.   Eyes: Pupils are equal, round, and reactive to light. No scleral icterus. Neck: Neck supple. No tracheal deviation present. Cardiovascular: Normal rate and regular rhythm. No murmur heard. Pulmonary/Chest: Effort normal and breath sounds normal. No respiratory distress. Abdominal: Soft. There is no tenderness. Musculoskeletal: Normal range of motion. She exhibits no deformity. Neurological: She is alert and oriented to person, place, and time. 3/5 strength of LLE. No pronator drift, no facial droop, normal speech, sensation intact. Skin: Skin is warm and dry. No rash noted. She is not diaphoretic. Psychiatric:    Does not appear confused but is very stoic and unconcerned appearing. Nursing note and vitals reviewed.         Diagnostic Study Results     Labs -  Labs Reviewed   CBC W/O DIFF - Abnormal; Notable for the following:        Result Value    RBC 3.56 (*)     HGB 11.5 (*)     HCT 34.7 (*)     MCV 97.5 (*)     All other components within normal limits   METABOLIC PANEL, COMPREHENSIVE - Abnormal; Notable for the following:     BUN 40 (*)     Creatinine 2.16 (*)     GFR est AA 27 (*)     GFR est non-AA 22 (*)     All other components within normal limits   ASPIRIN TEST - Abnormal; Notable for the following:     Aspirin test 431 (*)     All other components within normal limits   URINALYSIS W/ RFLX MICROSCOPIC - Abnormal; Notable for the following:     Leukocyte Esterase TRACE (*)     All other components within normal limits   ETHYL ALCOHOL - Abnormal; Notable for the following:     ALCOHOL(ETHYL),SERUM 232 (*)     All other components within normal limits   URINE MICROSCOPIC ONLY - Abnormal; Notable for the following:     Bacteria FEW (*)     All other components within normal limits   LIPID PANEL - Abnormal; Notable for the following:     HDL Cholesterol 66 (*)     All other components within normal limits   SED RATE (ESR) - Abnormal; Notable for the following:     Sed rate, automated 42 (*)     All other components within normal limits   CRP, HIGH SENSITIVITY - Abnormal; Notable for the following:     C-Reactive Protein, Cardiac 5.00 (*)     All other components within normal limits   METABOLIC PANEL, BASIC - Abnormal; Notable for the following:     BUN 33 (*)     BUN/Creatinine ratio 35 (*)     GFR est non-AA 59 (*)     Calcium 8.4 (*)     All other components within normal limits   CBC WITH AUTOMATED DIFF - Abnormal; Notable for the following:     RBC 3.38 (*)     HGB 10.9 (*)     HCT 33.4 (*)     MCV 98.8 (*)     MONOCYTES 15 (*)     EOSINOPHILS 6 (*)     All other components within normal limits   METABOLIC PANEL, COMPREHENSIVE - Abnormal; Notable for the following:     BUN/Creatinine ratio 25 (*)     Protein, total 6.1 (*)     Albumin 3.1 (*)     All other components within normal limits   GLUCOSE, POC - Abnormal; Notable for the following:     Glucose (POC) 67 (*)     All other components within normal limits   GLUCOSE, POC - Abnormal; Notable for the following:     Glucose (POC) 119 (*)     All other components within normal limits   GLUCOSE, POC - Abnormal; Notable for the following:     Glucose (POC) 159 (*)     All other components within normal limits   PROTHROMBIN TIME + INR   THROMBIN TIME   FIBRINOGEN   DRUG SCREEN, URINE   TSH 3RD GENERATION   T3, FREE   T4, FREE   VITAMIN B12   AMMONIA   MAGNESIUM   HEMOGLOBIN A1C WITH EAG   MAGNESIUM   PHOSPHORUS   OCCULT BLOOD, STOOL   METABOLIC PANEL, BASIC   GLUCOSE, POC   GLUCOSE, POC   GLUCOSE, POC   GLUCOSE, POC   GLUCOSE, POC   GLUCOSE, POC   TYPE & SCREEN       Radiologic Studies -   DUPLEX CAROTID BILATERAL   Final Result      MRA NECK WO CONT   Final Result      MRA BRAIN WO CONT   Final Result      MRI BRAIN WO CONT   Final Result      XR CHEST PORT   Final Result      CT HEAD WO CONT   Final Result        Ct Head Wo Cont  ------------------------------------------------------------------------------------------------------------------------------  IMPRESSION: 1. No acute intracranial abnormalities. 2. Mild patchy cerebral white matter low attenuation, nonspecific, most commonly microvascular ischemic white matter change. Telephone report was called to Dr. Regla Moy at 3:20 PM on 4/27/2018. Results were understood. Xr Chest Port  ---------------------------------------------------------------------------  IMPRESSION: Focal left basilar atelectasis versus infiltrate. Medical Decision Making   I am the first provider for this patient. I reviewed the vital signs, available nursing notes, past medical history, past surgical history, family history and social history. Vital Signs-Reviewed the patient's vital signs. Pulse Oximetry Analysis -  100% on room air (Interpretation) Normal     Cardiac Monitor:  Rate: 95  Rhythm:  Normal Sinus Rhythm     EKG: Interpreted by the EP. Time 15:30  Rhythm: NSR  Rate: 85 bpm  Interpretation: Normal axis, intervals WNL, boarderline LVH. Non-specidif t-wave abnormality in the anterior lateral leads. No acute st-elevations.  No prior for comparison. EKG interpret by Carmen Knight DO     Records Reviewed: Nursing Notes and Old Medical Records (Time of Review: 3:05 PM)    ED Course: Progress Notes, Reevaluation, and Consults:  ED Course   Value Comment By Time   Creatinine: (!) 2.16 New MINAL. ETOH 232. Pt admits to sitting in her car drinking ETOH. \"I may have had a drink or two. \" Refuses to stay for admission. Carmenglenroy KnightDO 04/27 1551    Granddaughter at bedside came from [place] states car parked sideways in driveway/yard, neighbors stated pt was confused, diaphoretic and couldn't move her left leg. Pt presentation concerning for ETOH, TIA, syncope, less likely seizure and will require admission for further work-up, observation and resolution of MINAL. Pt is agreeable for admission at this time. Carmen KinghtDO 04/27 1730       Provider Notes (Medical Decision Making):     DDX: ETOH intoxication, TIA, CVA, syncope, medication reaction, heat exhaustion, heat stroke, electrolyte abnormality, metabolic disturbance, occult infection     78 y.o. female with noted past medical history who presented with AMS and left sided weakness after being found by neighbors parked crooked at her home with the windows up confused, unknown time of onset. Code was alerted. The differential above was considered. The patient was given CT, ASA, IVF, labs, urine studies, monitoring, tele-neuro consultation and multiple re-evaluations. Diagnostics notable for MINAL, creatinine of 2.1, and ETOH of 232.     3:09 PM Consult: I discussed care with Dr. Wanda Aparicio (Teleneurologist). It was a standard discussion including patient history, chief complaint, available diagnostic results, and predicted treatment course. 3:19 PM pt's head CT is negative for acute intracranial abnormalities per radiology. 3:24 PM Consult: I discussed care with Dr. Wanda Aparicio (teleneurologist).   It was a standard discussion including patient history, chief complaint, available diagnostic results, and predicted treatment course. Recommends observation and admission, further workup including MRI and EEG.     6:54 PM Consult: I discussed care with Dr. Donnell Rivera (hospitalist). It was a standard discussion including patient history, chief complaint, available diagnostic results, and predicted treatment course. Will pass on the information to Dr. Lew Hancock, hospitalist.       For Hospitalized Patients:    1. Hospitalization Decision Time:  The decision to hospitalize the patient was made by Dr. Kelin Dewitt at [facility] PM on 4/27/2018    2. Aspirin: Aspirin was given    Diagnosis     Clinical Impression:   1. Alcoholic intoxication with complication ([address]    Left leg weakness  Altered Mental Status  MINAL    Disposition: Admitted    Follow-up Information     Follow up With Details Comments [facility] R Jasson Contreras MD In 1 week follow up with your PCP within 1 week [address]  [phone]             Discharge Medication List as of 4/29/2018  9:56 AM      CONTINUE these medications which have NOT CHANGED    Details   metoprolol (LOPRESSOR) 100 mg tablet Take 100 mg by mouth two (2) times a day. Historical Med, 100 mg      aspirin 81 mg tablet Take 81 mg by mouth. Historical Med, 81 mg      colchicine 0.6 mg tablet Take 1 tablet by mouth Q1Hour for gout pain. NOT TO EXCEED 3 TABLETS IN 24 HOURS., Print, Disp-30 Tab, R-0      esomeprazole (NEXIUM) 40 mg capsule Take 40 mg by mouth daily. Historical Med, 40 mg      atorvastatin (LIPITOR) 40 mg tablet Take 40 mg by mouth daily. Historical Med, 40 mg      febuxostat (ULORIC) 40 mg Tab tablet Take 40 mg by mouth daily. Historical Med, 40 mg         STOP taking these medications       HYDROcodone-acetaminophen (NORCO) 5-325 mg per tablet Comments:   Reason for Stopping:         lisinopril (PRINIVIL, ZESTRIL) 40 mg tablet Comments:   Reason for Stopping:         traMADol (ULTRAM) 50 mg tablet Comments:   Reason for Stopping:             _______________________________  Scribe Altamease Sale acting as a scribe for and in the presence of Leeann Alejandre DO      April 27, 2018 at 3:05 PM       Provider Attestation:      I personally performed the services described in the documentation, reviewed the documentation, as recorded by the scribe in my presence, and it accurately and completely records my words and actions.  April 27, 2018 at 3:05 PM - Leeann Alejandre DO

## 2018-04-27 NOTE — ED NOTES
Side rails up x 2:  YES  Bed in low position and wheels locked: YES  Call bell within reach: YES  Comfort addressed: YES    Toileting needs addressed: YES  Plan of care reviewed/updated with patient and or family members: YES  IV site assessed: YES  Pain assessed and addressed: YES, 0

## 2018-04-28 LAB
ANION GAP SERPL CALC-SCNC: 9 MMOL/L (ref 3–18)
ATRIAL RATE: 85 BPM
BUN SERPL-MCNC: 33 MG/DL (ref 7–18)
BUN/CREAT SERPL: 35 (ref 12–20)
CALCIUM SERPL-MCNC: 8.4 MG/DL (ref 8.5–10.1)
CALCULATED P AXIS, ECG09: 49 DEGREES
CALCULATED R AXIS, ECG10: -10 DEGREES
CALCULATED T AXIS, ECG11: 22 DEGREES
CHLORIDE SERPL-SCNC: 105 MMOL/L (ref 100–108)
CO2 SERPL-SCNC: 27 MMOL/L (ref 21–32)
CREAT SERPL-MCNC: 0.93 MG/DL (ref 0.6–1.3)
DIAGNOSIS, 93000: NORMAL
GLUCOSE BLD STRIP.AUTO-MCNC: 103 MG/DL (ref 70–110)
GLUCOSE BLD STRIP.AUTO-MCNC: 119 MG/DL (ref 70–110)
GLUCOSE BLD STRIP.AUTO-MCNC: 159 MG/DL (ref 70–110)
GLUCOSE SERPL-MCNC: 83 MG/DL (ref 74–99)
P-R INTERVAL, ECG05: 146 MS
POTASSIUM SERPL-SCNC: 3.8 MMOL/L (ref 3.5–5.5)
Q-T INTERVAL, ECG07: 396 MS
QRS DURATION, ECG06: 72 MS
QTC CALCULATION (BEZET), ECG08: 471 MS
SODIUM SERPL-SCNC: 141 MMOL/L (ref 136–145)
VENTRICULAR RATE, ECG03: 85 BPM

## 2018-04-28 PROCEDURE — 74011636637 HC RX REV CODE- 636/637: Performed by: HOSPITALIST

## 2018-04-28 PROCEDURE — 92526 ORAL FUNCTION THERAPY: CPT

## 2018-04-28 PROCEDURE — 99218 HC RM OBSERVATION: CPT

## 2018-04-28 PROCEDURE — 97161 PT EVAL LOW COMPLEX 20 MIN: CPT

## 2018-04-28 PROCEDURE — G8996 SWALLOW CURRENT STATUS: HCPCS

## 2018-04-28 PROCEDURE — G8997 SWALLOW GOAL STATUS: HCPCS

## 2018-04-28 PROCEDURE — G8979 MOBILITY GOAL STATUS: HCPCS

## 2018-04-28 PROCEDURE — 97116 GAIT TRAINING THERAPY: CPT

## 2018-04-28 PROCEDURE — G8978 MOBILITY CURRENT STATUS: HCPCS

## 2018-04-28 PROCEDURE — 96372 THER/PROPH/DIAG INJ SC/IM: CPT

## 2018-04-28 PROCEDURE — G8980 MOBILITY D/C STATUS: HCPCS

## 2018-04-28 PROCEDURE — 93880 EXTRACRANIAL BILAT STUDY: CPT

## 2018-04-28 PROCEDURE — 36415 COLL VENOUS BLD VENIPUNCTURE: CPT | Performed by: HOSPITALIST

## 2018-04-28 PROCEDURE — 82962 GLUCOSE BLOOD TEST: CPT

## 2018-04-28 PROCEDURE — 80048 BASIC METABOLIC PNL TOTAL CA: CPT | Performed by: HOSPITALIST

## 2018-04-28 PROCEDURE — G8998 SWALLOW D/C STATUS: HCPCS

## 2018-04-28 PROCEDURE — 74011250637 HC RX REV CODE- 250/637: Performed by: HOSPITALIST

## 2018-04-28 PROCEDURE — 93306 TTE W/DOPPLER COMPLETE: CPT

## 2018-04-28 PROCEDURE — 74011250636 HC RX REV CODE- 250/636: Performed by: HOSPITALIST

## 2018-04-28 PROCEDURE — 92610 EVALUATE SWALLOWING FUNCTION: CPT

## 2018-04-28 RX ADMIN — METOPROLOL TARTRATE 100 MG: 50 TABLET ORAL at 18:41

## 2018-04-28 RX ADMIN — HEPARIN SODIUM 5000 UNITS: 5000 INJECTION, SOLUTION INTRAVENOUS; SUBCUTANEOUS at 21:53

## 2018-04-28 RX ADMIN — ATORVASTATIN CALCIUM 80 MG: 40 TABLET, FILM COATED ORAL at 09:26

## 2018-04-28 RX ADMIN — INSULIN LISPRO 2 UNITS: 100 INJECTION, SOLUTION INTRAVENOUS; SUBCUTANEOUS at 18:41

## 2018-04-28 RX ADMIN — FOLIC ACID 1 MG: 1 TABLET ORAL at 09:26

## 2018-04-28 RX ADMIN — ASPIRIN 81 MG 81 MG: 81 TABLET ORAL at 09:26

## 2018-04-28 RX ADMIN — HEPARIN SODIUM 5000 UNITS: 5000 INJECTION, SOLUTION INTRAVENOUS; SUBCUTANEOUS at 06:40

## 2018-04-28 RX ADMIN — METOPROLOL TARTRATE 100 MG: 50 TABLET ORAL at 09:27

## 2018-04-28 RX ADMIN — HEPARIN SODIUM 5000 UNITS: 5000 INJECTION, SOLUTION INTRAVENOUS; SUBCUTANEOUS at 14:52

## 2018-04-28 RX ADMIN — THERA TABS 1 TABLET: TAB at 09:26

## 2018-04-28 RX ADMIN — Medication 100 MG: at 09:26

## 2018-04-28 RX ADMIN — DOCUSATE SODIUM AND SENNOSIDES 2 TABLET: 8.6; 5 TABLET, FILM COATED ORAL at 21:54

## 2018-04-28 NOTE — PROGRESS NOTES
Progress Note      Patient: Linnette Lucas               Sex: female          DOA: 4/27/2018       YOB: 1940      Age:  78 y.o.        LOS:  LOS: 1 day               Subjective:   Pt says thhat she has been drinking alcohol for a long timer . Her alcohol level on admission was 232 on admission the patient was found to be unresponsive on admission . She has had an mri and mra of the hed and neck . there has been no evidence of any acute cva . Will dc the pt in the am after she is walked in the halls . Objective:      Visit Vitals    /76 (BP 1 Location: Left arm, BP Patient Position: At rest)    Pulse 80    Temp 97.9 °F (36.6 °C)    Resp 16    SpO2 99%       Physical Exam:  Pt is awake and is responsive   Heart reg rate and rhythm   Lungs good breath sounds heard   abdomen soft and no pain on palpation    Neuro more alert today .       Lab/Data Reviewed:  BMP:   Lab Results   Component Value Date/Time     04/28/2018 04:57 AM    K 3.8 04/28/2018 04:57 AM     04/28/2018 04:57 AM    CO2 27 04/28/2018 04:57 AM    AGAP 9 04/28/2018 04:57 AM    GLU 83 04/28/2018 04:57 AM    BUN 33 (H) 04/28/2018 04:57 AM    CREA 0.93 04/28/2018 04:57 AM    GFRAA >60 04/28/2018 04:57 AM    GFRNA 59 (L) 04/28/2018 04:57 AM     CMP:   Lab Results   Component Value Date/Time     04/28/2018 04:57 AM    K 3.8 04/28/2018 04:57 AM     04/28/2018 04:57 AM    CO2 27 04/28/2018 04:57 AM    AGAP 9 04/28/2018 04:57 AM    GLU 83 04/28/2018 04:57 AM    BUN 33 (H) 04/28/2018 04:57 AM    CREA 0.93 04/28/2018 04:57 AM    GFRAA >60 04/28/2018 04:57 AM    GFRNA 59 (L) 04/28/2018 04:57 AM    CA 8.4 (L) 04/28/2018 04:57 AM    MG 2.4 04/27/2018 03:05 PM    ALB 3.6 04/27/2018 03:05 PM    TP 7.0 04/27/2018 03:05 PM    GLOB 3.4 04/27/2018 03:05 PM    AGRAT 1.1 04/27/2018 03:05 PM    SGOT 37 04/27/2018 03:05 PM    ALT 31 04/27/2018 03:05 PM     CBC:   Lab Results   Component Value Date/Time    WBC 4.7 04/27/2018 03:05 PM    HGB 11.5 (L) 04/27/2018 03:05 PM    HCT 34.7 (L) 04/27/2018 03:05 PM     04/27/2018 03:05 PM           Assessment/Plan     Principal Problem:    TIA (transient ischemic attack) (4/27/2018)    Active Problems:    DM (diabetes mellitus) ([facility] (4/27/2018)      HTN (hypertension) (4/27/2018)      HLD (hyperlipidemia) (4/27/2018)      Alcohol intoxication ([facility] (4/27/2018)      MINAL (acute kidney injury) [facility] (4/27/2018)  Alcohol dependence       Plan:pt and ot today .  Dc in the am

## 2018-04-28 NOTE — PROGRESS NOTES
Speech Therapy Note:    On-call SLP acknowledging eval & tx orders; will be in later this afternoon to address. Cassy MULLINS.  [facility] [address] [address] [facility]  Office: [phone]  Pager: [phone]

## 2018-04-28 NOTE — PROGRESS NOTES
Echocardiogram performed at the bedside. Cardiology report to follow.     Татьяна Shaw, [address]   [facility]  Lab Extension: [phone]  Pager: [phone]

## 2018-04-28 NOTE — PROGRESS NOTES
Problem: Falls - Risk of  Goal: *Absence of Falls  Document Sam Fall Risk and appropriate interventions in the flowsheet.   Outcome: Progressing Towards Goal  Fall Risk Interventions:

## 2018-04-28 NOTE — PROGRESS NOTES
Patient arrived on unit from ED via stretcher. Patient ambulated from stretcher to bed with assistance x1. AAOX4,calm and cooperative. Assessment completed. Dual NIH and skin assessment completed. Dysphagia screening completed. Patient denies pain at this time. Patient stated her last alcoholic beverage 4 oz of \"Trinidadian mist\" was at 10 am this morning. Patient also stated she drinks a half a pint of alcohol most days. Bed locked and at lowest position. Call bell within reach. Bed alarm activated. Patient resting in bed comfortably. 0730- Bedside and Verbal shift change report given to Vamshi Davies RN (oncoming nurse) by Alisia Pelletier RN (offgoing nurse). Report included the following information SBAR, Kardex and MAR. Dual NIH completed. Patient resting in bed.

## 2018-04-28 NOTE — PROGRESS NOTES
0730- Assumed care of patient from Guille Sutton RN (offgoing nurse). Patient awake in bed, a&ox4, with no complaints at this time. Dual NIH completed. Bed locked and in lowest position with call bell and frequently used items in reach. 1930- Bedside and Verbal shift change report given to CARSON Finch   (oncoming nurse) by Meron Larry (offgoing nurse). Report included the following information SBAR, Kardex, Intake/Output, MAR and Recent Results.

## 2018-04-28 NOTE — PROCEDURES
[facility]  *** FINAL REPORT ***    Name: Tavo Khan  MRN: [id number]    Outpatient  : 15 Jul 1940  HIS Order #: [id number]  [address] [facility] Visit #: [id number]  Date: 2018    TYPE OF TEST: Cerebrovascular Duplex    REASON FOR TEST  Transient Ischmic Attack    Right Carotid:-             Proximal               Mid                 Distal  cm/s  Systolic  Diastolic  Systolic  Diastolic  Systolic  Diastolic  CCA:     20.9      11.5       65.1      13.2       62.9      11.9  Bulb:  ECA:     84.7       0.0  ICA:     44.3      10.3       43.7       7.9       62.7      15.7  ICA/CCA:  1.0       1.2    ICA Stenosis: <50%    Right Vertebral:-  Finding: Antegrade  Sys:       45.8  Lissette:        8.8    Right Subclavian: Normal    Left Carotid:-            Proximal                Mid                 Distal  cm/s  Systolic  Diastolic  Systolic  Diastolic  Systolic  Diastolic  CCA:     06.4      14.5       81.5      14.5       65.5      13.4  Bulb:  ECA:     71.1       0.0  ICA:     44.2      10.1       45.4       9.2       51.5      13.5  ICA/CCA:  0.6       0.9    ICA Stenosis: <50%    Left Vertebral:-  Finding: Resistant  Sys:       62.9  Lissette:        0.0    Left Subclavian: Normal    INTERPRETATION/FINDINGS  Duplex images were obtained using 2-D gray scale, color flow, and  spectral Doppler analysis. 1. Bilateral <50% stenosis of the internal carotid arteries. 2. No significant stenosis in the external carotid arteries  bilaterally. 3. Antegrade flow in the right vertebral artery. 4. Resistant flow in the left vertebral artery may be due to a more  distal obstruction. 5. Normal flow in both subclavian arteries. Plaque Morphology:  1. Heterogeneous plaque in the bulb and right ICA. 2. Heterogeneous plaque in the bulb and left ICA. ADDITIONAL COMMENTS    I have personally reviewed the data relevant to the interpretation of  this  study. TECHNOLOGIST: Dorothy Estrada.  KIMBERLEY Sánchez  Signed: 2018 09:36 AM    PHYSICIAN: Siria Asencio.  Melania Sandoval MD  Signed: 04/29/2018 09:27 AM

## 2018-04-28 NOTE — PROGRESS NOTES
Care Management Interventions  PCP Verified by CM: Yes (Dr. Misbah Will - about a year ago)  Mode of Transport at Discharge: Other (see comment) (Family)  Transition of Care Consult (CM Consult): Discharge Planning  Current Support Network: Other (son lives with her)  Confirm Follow Up Transport: Self    Reason for Admission:   \"I was confused\"                   RRAT Score:          13           Plan for utilizing home health:     TBD. Discussed benefits, pt willing if appropriate. Likelihood of Readmission:   Mod/Yellow. ETOH abuse, multiple comorbities                         Transition of Care Plan:                 Home    Interviewed patient. Verified demographics listed on face sheet with patient; all information correct. Patient stated their PCP is Dr. Misbah Will though she has not seen her for about a year . Patient lives with her son. Patient independent with ADLs prior to admission. She did use a walker, a cane, and a CPAP at home prior to admission. Discharge plan is Home vs. Home with home health if appropriate.

## 2018-04-28 NOTE — PROGRESS NOTES
PHYSICAL THERAPY: Initial Assessment/Discharge   OBSERVATION: Medicare: Hospital Day: 2     Patient: Rk Mckoy (82 y.o. female)    Date: 4/28/2018  Primary Diagnosis: TIA (transient ischemic attack)    ,     Precautions:    WBAT     PLOF: Grandson lives with patient. Independent with ADLs and ambulatory with cane and/or rolling walker depending on how she feels    ASSESSMENT AND RECOMMENDATIONS:  Based on the objective data described below, the patient presents with independence with functional mobility. At baseline. Skilled physical therapy is not indicated at this time. EDUCATION:   Education:  Patient was educated on the following topics: to contact AA if she needs help and wants to stop drinking. Per patient, familiar with program and at one time was under their program  Barriers to Learning/Limitations: None    Discharge Recommendations: None  Further Equipment Recommendations for Discharge: N/A      SUBJECTIVE:   Patient stated Hoping to go home. Reports feels like she is at her baseline.     OBJECTIVE DATA SUMMARY:     Past Medical History:   Diagnosis Date    Diabetes ([address]     Hypercholesteremia     Hypertension      Past Surgical History:   Procedure Laterality Date    HX CARPAL TUNNEL RELEASE      HX ORTHOPAEDIC  april 2013    HX OTHER SURGICAL      left arm nerve surgery       Eval Complexity: History: LOW Complexity : Zero comorbidities / personal factors that will impact the outcome / POCExam:LOW Complexity : 1-2 Standardized tests and measures addressing body structure, function, activity limitation and / or participation in recreation  Presentation: LOW Complexity : Stable, uncomplicated  Clinical Decision Making:Low Complexity [facility] Standing Balance Scale Overall Complexity:LOW     G CODE:  Mobility [id number] Current  CL= 60-79%.   The severity rating is based on the [place] [id number] [facility] 151 Standing Balance Scale  0: Pt performs 25% or less of standing activity (Max assist) CN, 100% impaired. 1: Pt supports self with upper extremities but requires therapist assistance. Pt performs 25-50% of effort (Mod assist) CM, 80% to <100% impaired. 1+: Pt supports self with upper extremities but requires therapist assistance. Pt performs >50% effort. (Min assist). CL, 60% to <80% impaired. 2: Pt supports self independently with both upper extremities (walker, crutches, parallel bars). CL, 60% to <80% impaired. 2+: Pt support self independently with 1 upper extremity (cane, crutch, 1 parallel bar). CK, 40% to <60% impaired. 3: Pt stands without upper extremity support for up to 30 seconds. CK, 40% to <60% impaired. 3+: Pt stands without upper extremity support for 30 seconds or greater. CJ, 20% to <40% impaired. 4: Pt independently moves and returns center of gravity 1-2 inches in one plane. CJ, 20% to <40% impaired. 4+: Pt independently moves and returns center of gravity 1-2 inches in multiple planes. CI, 1% to <20% impaired. 5: Pt independently moves and returns center of gravity in all planes greater than 2 inches. CH, 0% impaired. Prior Level of Function/Home Situation: Independent with ADLs. Independent gait with cane and/or rolling walker  Home Situation  Home Environment: Private residence  # Steps to Enter: 2  Rails to Enter: Yes  Hand Rails : Bilateral  One/Two Story Residence: One story  Living Alone: No  Support Systems: Family member(s)  Patient Expects to be Discharged to[de-identified] Private residence  Current DME Used/Available at Home: [employer], straight, Wheelchair  Critical Behavior:  Neurologic State: Alert  Orientation Level: Oriented X4  Cognition: Appropriate decision making; Appropriate for age attention/concentration; Appropriate safety awareness; Follows commands  Safety/Judgement: Awareness of environment; Fall prevention  Psychosocial  Patient Behaviors: Calm; Cooperative  Purposeful Interaction: Yes  Pt Identified Daily Priority: Clinical issues (comment)  Kaleb Process: Nurture loving kindness  Caring Interventions: Therapeutic modalities  Therapeutic Modalities: Humor; Intentional therapeutic touch    Manual Muscle Testing (LE)         R     L    Hip Flexion:   5/5 5/5  Knee EXT:   5-/5 5/5  Knee FLEX:   5-/5 5/5  Ankle DF:   5/5 5/5  _________________________________________________   Tone : Normal  Sensation: Intact to light touch  Range Of Motion: WFL both LE    Functional Mobility:      Functional Status      Indep   (I)   Mod I   Super-vision   Min A   Mod A   Max A   Total A   Assist x2 Verbal cues Additional time Not tested   Comments   Rolling [x]  []  [] []    []    []  []  [] [] [] []    Supine to sit [x]  []  [] []  []  []  []  [] [] [] []    Sit to supine [x]  []  [] []  []  []  []  [] [] [] []    Sit to stand [x]  []  [] []  []  []  []  [] [] [] []    Stand to sit [x]  []  [] []  []  []  []  [] [] [] []    Bed to chair transfers []  []  [] []  []  []  []  [] [] [] [x]        Balance    Good   Fair   Poor   Unable   Not tested   Comments   Sitting static [x]  []  []  []  []    Sitting dynamic [x]  []  []  []  []    Standing static [x]  []  []  []  [] With rolling walker   Standing dynamic [x]  []  []  []  [] With rolling walker     Mobility/Gait:   Level of Assistance: Independent  Assistive Device: rolling walker  Distance Ambulated: 250 + feet     Left Lower Extremity: WBAT  Right Lower Extremity: WBAT  Base of Support: [facility]  Speed/Tila: pace decreased (<100 feet/min)  Step Length: WFL    Pain:  Pre treatment pain:  0  Post treatment pain:  0  Pain Scale 1: Numeric (0 - 10)  Pain Intensity 1: 0     Vital Signs  Temp: 98.4 °F (36.9 °C)     Pulse (Heart Rate): 70     BP: 111/63     Resp Rate: 16     O2 Sat (%): 98 %    Activity Tolerance:    Good    Please refer to the flowsheet for vital signs taken during this treatment.   After treatment:   []         Patient left in no apparent distress sitting up in chair  [x]         Patient left in no apparent distress in bed  [x]         Call bell left within reach  [x]         Nursing notified  []         Caregiver present  []         Bed alarm activated    COMMUNICATION/EDUCATION:   [x]         Fall prevention education was provided and the patient/caregiver indicated understanding. []         Patient/family have participated as able in goal setting and plan of care. []         Patient/family agree to work toward stated goals and plan of care. []         Patient understands intent and goals of therapy, but is neutral about his/her participation. []         Patient is unable to participate in goal setting and plan of care.     Recommendations for nursing:  Written on communication board: May ambulate independently with rolling walker  Verbally communicated to: nurse Vamshi Davies    Thank you for this referral.  Tyler Sullivan, PT   Time Calculation: 26 mins

## 2018-04-28 NOTE — ED NOTES
TRANSFER - ED to INPATIENT REPORT:    SBAR report made available to receiving floor on this patient being transferred to [facility] (2100)  for routine progression of care       Admitting diagnosis TIA (transient ischemic attack)    Information from the following report(s) SBAR, ED Summary, MAR and Recent Results was made available to receiving floor. Lines:       Medication list updated today    Opportunity for questions and clarification was provided.       Patient Last NIH 0  Patient is  A&Ox4, incontinent and ambulatory with assist     Valuables transported with patient     Patient transported with:   Monitor  Registered Nurse

## 2018-04-28 NOTE — PROGRESS NOTES
Problem: Dysphagia (Adult)  Goal: *Acute Goals and Plan of Care (Insert Text)  Outcome: Resolved/Met Date Met: 04/28/18  Speech LAnguage Pathology bedside swallow evaluation AND DISCHARGE    Patient: Roberto Del Rosario (89 y.o. female)  Date: 4/28/2018  Primary Diagnosis: TIA (transient ischemic attack)        Precautions: Safety Awarness    ASSESSMENT :  Clinical beside swallow eval completed per MD orders. Pt A&Ox4. Speech/voice within functional limits. Oral mech examination revealed structures functional for speech and deglutition. Pt observed with thin liquids +/- straw via single sips and successive swallows with timely swallow initiation, adequate laryngeal elevation to palpation and no overt s/sx aspiration. Pt demo positive rotary chew and thorough oral clearance with regular solids with no overt s/sx aspiration. Pt safe for regular diet with thin liquids, meds as tolerated with general safe swallow precautions. Pt educated with regard to s/sx aspiration, aspiration risk, diet recs and role of SLP. Pt able to verbalize understanding. Will sign off. Please re-consult as indicated. D/w RN, Quang Pelletier. PLAN :  Recommendations and Planned Interventions:  No formal ST needs ID'd for dysphagia. Eval only. Discharge Recommendations: To Be Determined     SUBJECTIVE:   Patient stated I've got a hole in my tooth, so it hurts\".     OBJECTIVE:     Past Medical History:   Diagnosis Date    Diabetes ([address]     Hypercholesteremia     Hypertension      Past Surgical History:   Procedure Laterality Date    HX CARPAL TUNNEL RELEASE      HX ORTHOPAEDIC  april 2013    HX OTHER SURGICAL      left arm nerve surgery     Prior Level of Function/Home Situation:   Home Situation  Home Environment: Private residence  One/Two Story Residence: One story  Living Alone: No  Support Systems: Family member(s)  Patient Expects to be Discharged to[de-identified] Private residence  Current DME Used/Available at Home: Cane, straight, Wheelchair  Diet prior to admission: Regular with thin liquids  Current Diet:  Regular with thin liquids  Cognitive and Communication Status:  Neurologic State: Alert  Orientation Level: Oriented X4  Cognition: Follows commands           Oral Assessment:     P.O. Trials:     Vocal quality prior to P.O.:                                                           GCODESwallowing:  Swallow Goal Status CH= 0%   Swallow D/C Status CH= 0%    The severity rating is based on the following outcomes:    Clinical judgment    Pain:  Pt reports 0/10 pain or discomfort prior to eval.   Pt reports 0/10 pain or discomfort post eval.     After treatment:   []            Patient left in no apparent distress sitting up in chair  [x]            Patient left in no apparent distress in bed  [x]            Call bell left within reach  [x]            Nursing notified  []            Caregiver present  []            Bed alarm activated    COMMUNICATION/EDUCATION:   [x]            SLP educated pt with regard to compensatory swallow strategies and       aspiration/reflux precautions including: small bites/sips,       alternate liquids/solids, decrease feeding rate, HOB > 45 with all po, and       upright in bed at 30 degrees after po for at least 45       minutes. []            Patient/family have participated as able in goal setting and plan of care. []            Patient/family agree to work toward stated goals and plan of care. []            Patient understands intent and goals of therapy; neutral about participation. []            Patient is unable to participate in goal setting and plan of care.     Thank you for this referral.  Megan Carney, SLP   [place], [id number] [facility]  Speech-Language Pathologist

## 2018-04-28 NOTE — PROGRESS NOTES
Stroke Education provided to patient and the following topics were discussed    1. Patients personal risk factors for stroke are hypertension, hyperlipidemia and diabetes mellitus    2. Warning signs of Stroke:        * Sudden numbness or weakness of the face, arm or leg, especially on one side of          The body            * Sudden confusion, trouble speaking or understanding        * Sudden trouble seeing in one or both eyes        * Sudden trouble walking, dizziness, loss of balance or coordination        * Sudden severe headache with no known cause      3. Importance of activation Emergency Medical Services ( 9-1-1 ) immediately if experience any warning signs of stroke. 4. Be sure and schedule a follow-up appointment with your primary care doctor or any specialists as instructed. 5. You must take medicine every day to treat your risk factors for stroke. Be sure to take your medicines exactly as your doctor tells you: no more, no less. Know what your medicines are for , what they do. Anti-thrombotics /anticoagulants can help prevent strokes. You are taking the following medicine(s)       6.  Smoking and second-hand smoke greatly increase your risk of stroke, cardiovascular disease and death. Smoking history never    7. Information provided was [facility] Stroke Education Binder    8. Documentation of teaching completed in Patient Education Activity and on Care Plan with teaching response noted?   yes

## 2018-04-28 NOTE — PROGRESS NOTES
Problem: Falls - Risk of  Goal: *Absence of Falls  Document Sam Fall Risk and appropriate interventions in the flowsheet.    Outcome: Progressing Towards Goal  Fall Risk Interventions:            Medication Interventions: Patient to call before getting OOB    Elimination Interventions: Call light in reach, Patient to call for help with toileting needs

## 2018-04-29 VITALS
RESPIRATION RATE: 16 BRPM | DIASTOLIC BLOOD PRESSURE: 74 MMHG | TEMPERATURE: 98.2 F | OXYGEN SATURATION: 100 % | SYSTOLIC BLOOD PRESSURE: 137 MMHG | WEIGHT: 179.01 LBS | BODY MASS INDEX: 30.56 KG/M2 | HEART RATE: 71 BPM | HEIGHT: 64 IN

## 2018-04-29 LAB
ALBUMIN SERPL-MCNC: 3.1 G/DL (ref 3.4–5)
ALBUMIN/GLOB SERPL: 1 {RATIO} (ref 0.8–1.7)
ALP SERPL-CCNC: 56 U/L (ref 45–117)
ALT SERPL-CCNC: 20 U/L (ref 13–56)
ANION GAP SERPL CALC-SCNC: 4 MMOL/L (ref 3–18)
AST SERPL-CCNC: 17 U/L (ref 15–37)
BASOPHILS # BLD: 0 K/UL (ref 0–0.06)
BASOPHILS NFR BLD: 0 % (ref 0–2)
BILIRUB SERPL-MCNC: 0.4 MG/DL (ref 0.2–1)
BUN SERPL-MCNC: 18 MG/DL (ref 7–18)
BUN/CREAT SERPL: 25 (ref 12–20)
CALCIUM SERPL-MCNC: 9 MG/DL (ref 8.5–10.1)
CHLORIDE SERPL-SCNC: 106 MMOL/L (ref 100–108)
CO2 SERPL-SCNC: 28 MMOL/L (ref 21–32)
CREAT SERPL-MCNC: 0.71 MG/DL (ref 0.6–1.3)
CRP SERPL HS-MCNC: 5 MG/L (ref 0–3)
DIFFERENTIAL METHOD BLD: ABNORMAL
EOSINOPHIL # BLD: 0.3 K/UL (ref 0–0.4)
EOSINOPHIL NFR BLD: 6 % (ref 0–5)
ERYTHROCYTE [DISTWIDTH] IN BLOOD BY AUTOMATED COUNT: 14.2 % (ref 11.6–14.5)
GLOBULIN SER CALC-MCNC: 3 G/DL (ref 2–4)
GLUCOSE BLD STRIP.AUTO-MCNC: 100 MG/DL (ref 70–110)
GLUCOSE BLD STRIP.AUTO-MCNC: 89 MG/DL (ref 70–110)
GLUCOSE BLD STRIP.AUTO-MCNC: 89 MG/DL (ref 70–110)
GLUCOSE SERPL-MCNC: 93 MG/DL (ref 74–99)
HCT VFR BLD AUTO: 33.4 % (ref 35–45)
HGB BLD-MCNC: 10.9 G/DL (ref 12–16)
LYMPHOCYTES # BLD: 1.6 K/UL (ref 0.9–3.6)
LYMPHOCYTES NFR BLD: 34 % (ref 21–52)
MAGNESIUM SERPL-MCNC: 2 MG/DL (ref 1.6–2.6)
MCH RBC QN AUTO: 32.2 PG (ref 24–34)
MCHC RBC AUTO-ENTMCNC: 32.6 G/DL (ref 31–37)
MCV RBC AUTO: 98.8 FL (ref 74–97)
MONOCYTES # BLD: 0.7 K/UL (ref 0.05–1.2)
MONOCYTES NFR BLD: 15 % (ref 3–10)
NEUTS SEG # BLD: 2.2 K/UL (ref 1.8–8)
NEUTS SEG NFR BLD: 45 % (ref 40–73)
PHOSPHATE SERPL-MCNC: 3 MG/DL (ref 2.5–4.9)
PLATELET # BLD AUTO: 221 K/UL (ref 135–420)
PMV BLD AUTO: 11.7 FL (ref 9.2–11.8)
POTASSIUM SERPL-SCNC: 4 MMOL/L (ref 3.5–5.5)
PROT SERPL-MCNC: 6.1 G/DL (ref 6.4–8.2)
RBC # BLD AUTO: 3.38 M/UL (ref 4.2–5.3)
SODIUM SERPL-SCNC: 138 MMOL/L (ref 136–145)
WBC # BLD AUTO: 4.8 K/UL (ref 4.6–13.2)

## 2018-04-29 PROCEDURE — 83735 ASSAY OF MAGNESIUM: CPT | Performed by: HOSPITALIST

## 2018-04-29 PROCEDURE — 80053 COMPREHEN METABOLIC PANEL: CPT | Performed by: HOSPITALIST

## 2018-04-29 PROCEDURE — 74011250636 HC RX REV CODE- 250/636: Performed by: HOSPITALIST

## 2018-04-29 PROCEDURE — 99218 HC RM OBSERVATION: CPT

## 2018-04-29 PROCEDURE — 96372 THER/PROPH/DIAG INJ SC/IM: CPT

## 2018-04-29 PROCEDURE — 36415 COLL VENOUS BLD VENIPUNCTURE: CPT | Performed by: HOSPITALIST

## 2018-04-29 PROCEDURE — 74011250637 HC RX REV CODE- 250/637: Performed by: HOSPITALIST

## 2018-04-29 PROCEDURE — 84100 ASSAY OF PHOSPHORUS: CPT | Performed by: HOSPITALIST

## 2018-04-29 PROCEDURE — 85025 COMPLETE CBC W/AUTO DIFF WBC: CPT | Performed by: HOSPITALIST

## 2018-04-29 RX ADMIN — ATORVASTATIN CALCIUM 80 MG: 40 TABLET, FILM COATED ORAL at 09:52

## 2018-04-29 RX ADMIN — ASPIRIN 81 MG 81 MG: 81 TABLET ORAL at 09:52

## 2018-04-29 RX ADMIN — HEPARIN SODIUM 5000 UNITS: 5000 INJECTION, SOLUTION INTRAVENOUS; SUBCUTANEOUS at 05:53

## 2018-04-29 RX ADMIN — FOLIC ACID 1 MG: 1 TABLET ORAL at 09:52

## 2018-04-29 NOTE — PROGRESS NOTES
1930: Bedside verbal shift report received from [employer], [address] [place] [address]. Pt is awake in bed, no signs of distress, instructed to press call light if assistance is needed, call light within reach.    -Dual NIH at bedside: 0    0725: Bedside and Verbal shift change report given to [employer], [address] [place] [address] (oncoming nurse) by Charanjit Wells RN (offgoing nurse). Report included the following information SBAR, Kardex, Intake/Output, MAR and Recent Results.

## 2018-04-29 NOTE — PROGRESS NOTES
Problem: Falls - Risk of  Goal: *Absence of Falls  Document Sam Fall Risk and appropriate interventions in the flowsheet.    Outcome: Progressing Towards Goal  Fall Risk Interventions:            Medication Interventions: Patient to call before getting OOB    Elimination Interventions: Patient to call for help with toileting needs

## 2018-04-29 NOTE — PROGRESS NOTES
Assumed care of patient from CARSON Finch (offgoing nurse). Patient awake in bed with no complaints at this time. Bed locked and in lowest position with call bell in reach. Dual NIH completed. 1142- Patient discharge home accompanied by CNA via wheelchair to front entrance to car. No distress noted. Patient has discharge instructions along with belongings. Voiced understanding of discharge instructions.

## 2018-04-29 NOTE — DISCHARGE SUMMARY
Discharge Summary    Patient: Jose Jackson               Sex: female          DOA: 4/27/2018         YOB: 1940      Age:  78 y.o.        LOS:  LOS: 1 day                Admit Date: 4/27/2018    Discharge Date: 4/29/2018    Discharge Medications:     Discharge Medication List as of 4/29/2018  9:56 AM      CONTINUE these medications which have NOT CHANGED    Details   metoprolol (LOPRESSOR) 100 mg tablet Take 100 mg by mouth two (2) times a day. Historical Med, 100 mg      aspirin 81 mg tablet Take 81 mg by mouth. Historical Med, 81 mg      colchicine 0.6 mg tablet Take 1 tablet by mouth Q1Hour for gout pain. NOT TO EXCEED 3 TABLETS IN 24 HOURS., Print, Disp-30 Tab, R-0      esomeprazole (NEXIUM) 40 mg capsule Take 40 mg by mouth daily. Historical Med, 40 mg      atorvastatin (LIPITOR) 40 mg tablet Take 40 mg by mouth daily. Historical Med, 40 mg      febuxostat (ULORIC) 40 mg Tab tablet Take 40 mg by mouth daily. Historical Med, 40 mg         STOP taking these medications       lisinopril (PRINIVIL, ZESTRIL) 40 mg tablet Comments:   Reason for Stopping:         traMADol (ULTRAM) 50 mg tablet Comments:   Reason for Stopping:         HYDROcodone-acetaminophen (NORCO) 5-325 mg per tablet Comments:   Reason for Stopping:                Follow-up: PCP    Discharge Condition: Stable    Activity: Activity as tolerated    Diet: Resume previous diet    Labs:  Labs: Results:       Chemistry Recent Labs      04/29/18   0415  04/28/18   0457  04/27/18   1505   GLU  93  83  96   NA  138  141  141   K  4.0  3.8  4.0   CL  106  105  102   CO2  28  27  26   BUN  18  33*  40*   CREA  0.71  0.93  2.16*   CA  9.0  8.4*  8.6   AGAP  4  9  13   BUCR  25*  35*  19   AP  56   --   61   TP  6.1*   --   7.0   ALB  3.1*   --   3.6   GLOB  3.0   --   3.4   AGRAT  1.0   --   1.1      CBC w/Diff Recent Labs      04/29/18   0415  04/27/18   1505   WBC  4.8  4.7   RBC  3.38*  3.56*   HGB  10.9*  11.5*   HCT  33.4*  34.7*   PLT  221 240   GRANS  45   --    LYMPH  34   --    EOS  6*   --       Cardiac Enzymes No results for input(s): CPK, CKND1, KYRA in the last 72 hours. No lab exists for component: CKRMB, TROIP   Coagulation Recent Labs      04/27/18   1505   PTP  12.7   INR  1.0       Lipid Panel Lab Results   Component Value Date/Time    Cholesterol, total 188 04/27/2018 03:05 PM    HDL Cholesterol 66 (H) 04/27/2018 03:05 PM    LDL, calculated 98.8 04/27/2018 03:05 PM    VLDL, calculated 23.2 04/27/2018 03:05 PM    Triglyceride 116 04/27/2018 03:05 PM    CHOL/HDL Ratio 2.8 04/27/2018 03:05 PM      BNP No results for input(s): BNPP in the last 72 hours. Liver Enzymes Recent Labs      04/29/18   0415   TP  6.1*   ALB  3.1*   AP  56   SGOT  17      Thyroid Studies Lab Results   Component Value Date/Time    TSH 0.61 04/27/2018 03:05 PM          Imaging:      MRI brain    1. No acute hemorrhage or acute infarction. 2. Atrophy as described with white matter abnormality that is nonspecific but  likely ischemic. No other significant intracranial abnormality is appreciated. MRA neck  1. No definitive exam is significant cervical vascular stenosis is documented. 2. The proximal right vertebral artery is not well seen, this could well be  artifactual from patient motion and technique. Stenosis cannot be excluded.   3. Poor evaluation distal cervical and proximal intracranial vertebral arteries,  also likely technical.    Consults: None    Treatment Team: Treatment Team: Consulting Provider: Rosangela Moody MD; Care Manager: Dru Oh RN; Charge Nurse: Julio Sherwood RN    Significant Diagnostic Studies: labs:   Recent Results (from the past 24 hour(s))   GLUCOSE, POC    Collection Time: 04/28/18  6:16 PM   Result Value Ref Range    Glucose (POC) 159 (H) 70 - 110 mg/dL   GLUCOSE, POC    Collection Time: 04/28/18 10:16 PM   Result Value Ref Range    Glucose (POC) 89 70 - 110 mg/dL   GLUCOSE, POC    Collection Time: 04/28/18 10:16 PM Result Value Ref Range    Glucose (POC) 89 70 - 110 mg/dL   CBC WITH AUTOMATED DIFF    Collection Time: 04/29/18  4:15 AM   Result Value Ref Range    WBC 4.8 4.6 - 13.2 K/uL    RBC 3.38 (L) 4.20 - 5.30 M/uL    HGB 10.9 (L) 12.0 - 16.0 g/dL    HCT 33.4 (L) 35.0 - 45.0 %    MCV 98.8 (H) 74.0 - 97.0 FL    MCH 32.2 24.0 - 34.0 PG    MCHC 32.6 31.0 - 37.0 g/dL    RDW 14.2 11.6 - 14.5 %    PLATELET 210 660 - 129 K/uL    MPV 11.7 9.2 - 11.8 FL    NEUTROPHILS 45 40 - 73 %    LYMPHOCYTES 34 21 - 52 %    MONOCYTES 15 (H) 3 - 10 %    EOSINOPHILS 6 (H) 0 - 5 %    BASOPHILS 0 0 - 2 %    ABS. NEUTROPHILS 2.2 1.8 - 8.0 K/UL    ABS. LYMPHOCYTES 1.6 0.9 - 3.6 K/UL    ABS. MONOCYTES 0.7 0.05 - 1.2 K/UL    ABS. EOSINOPHILS 0.3 0.0 - 0.4 K/UL    ABS. BASOPHILS 0.0 0.0 - 0.06 K/UL    DF AUTOMATED     METABOLIC PANEL, COMPREHENSIVE    Collection Time: 04/29/18  4:15 AM   Result Value Ref Range    Sodium 138 136 - 145 mmol/L    Potassium 4.0 3.5 - 5.5 mmol/L    Chloride 106 100 - 108 mmol/L    CO2 28 21 - 32 mmol/L    Anion gap 4 3.0 - 18 mmol/L    Glucose 93 74 - 99 mg/dL    BUN 18 7.0 - 18 MG/DL    Creatinine 0.71 0.6 - 1.3 MG/DL    BUN/Creatinine ratio 25 (H) 12 - 20      GFR est AA >60 >60 ml/min/1.73m2    GFR est non-AA >60 >60 ml/min/1.73m2    Calcium 9.0 8.5 - 10.1 MG/DL    Bilirubin, total 0.4 0.2 - 1.0 MG/DL    ALT (SGPT) 20 13 - 56 U/L    AST (SGOT) 17 15 - 37 U/L    Alk.  phosphatase 56 45 - 117 U/L    Protein, total 6.1 (L) 6.4 - 8.2 g/dL    Albumin 3.1 (L) 3.4 - 5.0 g/dL    Globulin 3.0 2.0 - 4.0 g/dL    A-G Ratio 1.0 0.8 - 1.7     MAGNESIUM    Collection Time: 04/29/18  4:15 AM   Result Value Ref Range    Magnesium 2.0 1.6 - 2.6 mg/dL   PHOSPHORUS    Collection Time: 04/29/18  4:15 AM   Result Value Ref Range    Phosphorus 3.0 2.5 - 4.9 MG/DL   GLUCOSE, POC    Collection Time: 04/29/18  8:23 AM   Result Value Ref Range    Glucose (POC) 100 70 - 110 mg/dL         Discharge diagnoses:    Problem List as of 4/29/2018  Never Reviewed          Codes Class Noted - Resolved    DM (diabetes mellitus) ([facility] ICD-10-CM: E11.9  ICD-9-CM: 250.00  4/27/2018 - Present        HTN (hypertension) ICD-10-CM: I10  ICD-9-CM: 401.9  4/27/2018 - Present        HLD (hyperlipidemia) ICD-10-CM: E78.5  ICD-9-CM: 272.4  4/27/2018 - Present        Alcohol intoxication ([facility] ICD-10-CM: K88.394  ICD-9-CM: 305.00  4/27/2018 - Present        * (Principal)TIA (transient ischemic attack) ICD-10-CM: G45.9  ICD-9-CM: 435.9  4/27/2018 - Present        MINAL (acute kidney injury) [facility]) ICD-10-CM: N17.9  ICD-9-CM: 584.9  4/27/2018 - Present            Hospital Course:   Major issues addressed during hospitalization outlined  below. Mariann Mary is a 78y.o. year old female who presents with  Alcohol intoxication. PAtient states neighbor found her in her car passed out in the driveway and called ems. Patient states for last 2 months she has been drinking 2 drinks a day although I suspect much more. She was apparently on her way to [employer] store when she passed out. There is some mention of facial droop and LLE weakness by EMS however no symptoms on arrival and patient doesn't claim anything but chronic weakness requiring cane 2/2 arthritis and knee replacement. She will be observed for CVA r/o. Patient underwent MRI's with negative findings. Carotids completed with <50 stenosis. Patient had this episode 2/2 alcoholism .   F/u with pcp in 1 week  Destinee Pool MD  April 29, 2018        Total time spent 35 minutes

## 2018-04-29 NOTE — PROGRESS NOTES
Nutrition initial assessment/Plan of care      RECOMMENDATIONS:     1. Cardiac diet  2. Monitor weight, labs and PO intake  3. RD to follow     GOALS:     1. PO intake meets >75% of protein/calorie needs by 5/6  2. Weight Maintenance (+/- 1-2 lb by 5/6)     ASSESSMENT:     Weight status is classified as obese per BMI of 30.7. Tolerating diet. Labs noted. Nutrition recommendations listed. RD to follow. Nutrition Diagnoses:   Obesity related to excessive energy intake as evidenced by BMI of 30.7. Nutrition Risk:  [] High  [] Moderate [x]  Low    SUBJECTIVE/OBJECTIVE:      In observation status. Admitted for TIA work-up. Patient with history of alcohol abuse, diabetes, HTN  and hypercholesteremia. Reviewed SLP note- cleared for regular texture and thin liquids. Tolerating diet. No known food allergies. Will monitor. Diet: Cardiac diet  Medications: [x] Reviewed (Lipitor 80 mg)  Allergies: [x] Reviewed   Encounter Diagnoses     ICD-10-CM ICD-9-CM   1.  Alcoholic intoxication with complication ([facility] X61.987 305.00     Past Medical History:   Diagnosis Date    Diabetes ([facility]     Hypercholesteremia     Hypertension       Labs:  Lab Results   Component Value Date/Time    Sodium 138 04/29/2018 04:15 AM    Potassium 4.0 04/29/2018 04:15 AM    Chloride 106 04/29/2018 04:15 AM    CO2 28 04/29/2018 04:15 AM    Anion gap 4 04/29/2018 04:15 AM    Glucose 93 04/29/2018 04:15 AM    BUN 18 04/29/2018 04:15 AM    Creatinine 0.71 04/29/2018 04:15 AM    Calcium 9.0 04/29/2018 04:15 AM    Magnesium 2.0 04/29/2018 04:15 AM    Phosphorus 3.0 04/29/2018 04:15 AM    Albumin 3.1 (L) 04/29/2018 04:15 AM     Lab Results   Component Value Date/Time    Cholesterol, total 188 04/27/2018 03:05 PM    HDL Cholesterol 66 (H) 04/27/2018 03:05 PM    LDL, calculated 98.8 04/27/2018 03:05 PM    VLDL, calculated 23.2 04/27/2018 03:05 PM    Triglyceride 116 04/27/2018 03:05 PM    CHOL/HDL Ratio 2.8 04/27/2018 03:05 PM     Anthropometrics: BMI (calculated): 30.7  Last 3 Recorded Weights in this Encounter    04/29/18 1141   Weight: 81.2 kg (179 lb 0.2 oz)    Ht Readings from Last 1 Encounters:   04/27/18 5' 4\" (1.626 m)     Patient Vitals for the past 100 hrs:   % Diet Eaten   04/28/18 1339 30 %     IBW: 120 lb %IBW: 149%    Estimated Nutrition Needs: [x] MSJ  [] Other:  Calories: 1680 Kcal Based on:   [x] Actual BW    Protein:   81 g Based on:   [x] Actual BW    Fluid:       1288-8350 ml Based on:   [x] Actual BW      [x] No Cultural, Judaism or ethnic dietary need identified.     [] Cultural, Judaism and ethnic food preferences identified and addressed     Wt Status:  [] Normal (18.6 - 24.9) [] Underweight (<18.5) [] Overweight (25 - 29.9) [x] Mild Obesity (30 - 34.9)  [] Moderate Obesity (35 - 39.9) [] Morbid Obesity (40+)     Nutrition Problems Identified:   [] Suboptimal PO intake   [] Food Allergies  [] Difficulty chewing/swallowing/poor dentition  [] Constipation/Diarrhea   [] Nausea/Vomiting   [x] None  [] Other:     Plan:   [x] Therapeutic Diet  []  Obtained/adjusted food preferences/tolerances and/or snacks options   []  Supplements added   [] Occupational therapy following for feeding techniques  []  HS snack added   []  Modify diet texture   []  Modify diet for food allergies   []  Assist with menu selection   [x]  Monitor PO intake on meal rounds   [x]  Continue inpatient monitoring and intervention   []  Participated in discharge planning/Interdisciplinary rounds/Team meetings   []  Other:     Nutrition Monitoring and Evaluation:  [x] Continue ongoing monitoring and intervention  [] Other    Melania Day

## 2018-04-29 NOTE — DISCHARGE INSTRUCTIONS
DISCHARGE SUMMARY from Nurse    PATIENT INSTRUCTIONS:    After general anesthesia or intravenous sedation, for 24 hours or while taking prescription Narcotics:  · Limit your activities  · Do not drive and operate hazardous machinery  · Do not make important personal or business decisions  · Do  not drink alcoholic beverages  · If you have not urinated within 8 hours after discharge, please contact your surgeon on call. Report the following to your surgeon:  · Excessive pain, swelling, redness or odor of or around the surgical area  · Temperature over 100.5  · Nausea and vomiting lasting longer than 4 hours or if unable to take medications  · Any signs of decreased circulation or nerve impairment to extremity: change in color, persistent  numbness, tingling, coldness or increase pain  · Any questions    What to do at Home:  Recommended activity: Activity as tolerated    If you experience any of the following symptoms listed under Signs and Symptoms of a Stroke or Warning Signs of a Heart Attack, please follow up with your PCP and/or call 911. *  Please give a list of your current medications to your Primary Care Provider. *  Please update this list whenever your medications are discontinued, doses are      changed, or new medications (including over-the-counter products) are added. *  Please carry medication information at all times in case of emergency situations. These are general instructions for a healthy lifestyle:    No smoking/ No tobacco products/ Avoid exposure to second hand smoke  Surgeon General's Warning:  Quitting smoking now greatly reduces serious risk to your health.     Obesity, smoking, and sedentary lifestyle greatly increases your risk for illness    A healthy diet, regular physical exercise & weight monitoring are important for maintaining a healthy lifestyle    You may be retaining fluid if you have a history of heart failure or if you experience any of the following symptoms: Weight gain of 3 pounds or more overnight or 5 pounds in a week, increased swelling in our hands or feet or shortness of breath while lying flat in bed. Please call your doctor as soon as you notice any of these symptoms; do not wait until your next office visit. Recognize signs and symptoms of STROKE:    F-face looks uneven    A-arms unable to move or move unevenly    S-speech slurred or non-existent    T-time-call 911 as soon as signs and symptoms begin-DO NOT go       Back to bed or wait to see if you get better-TIME IS BRAIN. Warning Signs of HEART ATTACK     Call 911 if you have these symptoms:   Chest discomfort. Most heart attacks involve discomfort in the center of the chest that lasts more than a few minutes, or that goes away and comes back. It can feel like uncomfortable pressure, squeezing, fullness, or pain.  Discomfort in other areas of the upper body. Symptoms can include pain or discomfort in one or both arms, the back, neck, jaw, or stomach.  Shortness of breath with or without chest discomfort.  Other signs may include breaking out in a cold sweat, nausea, or lightheadedness. Don't wait more than five minutes to call 911 - MINUTES MATTER! Fast action can save your life. Calling 911 is almost always the fastest way to get lifesaving treatment. Emergency Medical Services staff can begin treatment when they arrive -- up to an hour sooner than if someone gets to the hospital by car. The discharge information has been reviewed with the patient. The patient verbalized understanding. Discharge medications reviewed with the patient and appropriate educational materials and side effects teaching were provided.   ___________________________________________________________________________________________________________________________________     Transient Ischemic Attack: Care Instructions  Your Care Instructions    A transient ischemic attack (TIA) is when blood flow to a part of your brain is blocked for a short time. A TIA is like a stroke but usually lasts only a few minutes. A TIA does not cause lasting brain damage. Any vision problems, slurred speech, or other symptoms usually go away in 10 to 20 minutes. But they may last for up to 24 hours. TIAs are often warning signs of a stroke. Some people who have a TIA may have a stroke in the future. A stroke can cause symptoms like those of a TIA. But a stroke causes lasting damage to your brain. You can take steps to help prevent a stroke. One thing you can do is get early treatment. If you have other new symptoms, or if your symptoms do not get better, go back to the emergency room or call your doctor right away. Getting treatment right away may prevent long-term brain damage caused by a stroke. The doctor has checked you carefully, but problems can develop later. If you notice any problems or new symptoms, get medical treatment right away. Follow-up care is a key part of your treatment and safety. Be sure to make and go to all appointments, and call your doctor if you are having problems. It's also a good idea to know your test results and keep a list of the medicines you take. How can you care for yourself at home? Medicines  ? · Be safe with medicines. Take your medicines exactly as prescribed. Call your doctor if you think you are having a problem with your medicine. ? · If you take a blood thinner, such as aspirin, be sure you get instructions about how to take your medicine safely. Blood thinners can cause serious bleeding problems. ? · Call your doctor if you are not able to take your medicines for any reason. ? · Do not take any over-the-counter medicines or herbal products without talking to your doctor first.   ? · If you take birth control pills or hormone therapy, talk to your doctor. Ask if these treatments are right for you. ? Lifestyle changes  ? · Do not smoke.  If you need help quitting, talk to your doctor about stop-smoking programs and medicines. ? · Be active. If your doctor recommends it, get more exercise. Walking is a good choice. Bit by bit, increase the amount you walk every day. Try for at least 30 minutes on most days of the week. You also may want to swim, bike, or do other activities. ? · Eat heart-healthy foods. These include fruits, vegetables, high-fiber foods, fish, and foods that are low in sodium, saturated fat, and trans fat. ? · Stay at a healthy weight. Lose weight if you need to.   ? · Limit alcohol to 2 drinks a day for men and 1 drink a day for women. ?Staying healthy  ? · Manage other health problems such as diabetes, high blood pressure, and high cholesterol. ? · Get the flu vaccine every year. When should you call for help? Call 911 anytime you think you may need emergency care. For example, call if:  ? · You have new or worse symptoms of a stroke. These may include:  ¨ Sudden numbness, tingling, weakness, or loss of movement in your face, arm, or leg, especially on only one side of your body. ¨ Sudden vision changes. ¨ Sudden trouble speaking. ¨ Sudden confusion or trouble understanding simple statements. ¨ Sudden problems with walking or balance. ¨ A sudden, severe headache that is different from past headaches. Call 911 even if these symptoms go away in a few minutes. ? · You feel like you are having another TIA. ? Watch closely for changes in your health, and be sure to contact your doctor if you have any problems. Where can you learn more? Go to http://marshall-tom.info/. Enter ([phone] in the search box to learn more about \"Transient Ischemic Attack: Care Instructions. \"  Current as of: March 20, 2017  Content Version: 11.4  © 3467-4490 [employer]. Care instructions adapted under license by [employer] (which disclaims liability or warranty for this information).  If you have questions about a medical condition or this instruction, always ask your healthcare professional. [place] [address] any warranty or liability for your use of this information.     Patient armband removed and shredded

## 2018-09-26 ENCOUNTER — HOSPITAL ENCOUNTER (INPATIENT)
Age: 78
LOS: 3 days | Discharge: HOME HEALTH | DRG: 310 | End: 2018-09-29
Attending: EMERGENCY MEDICINE | Admitting: INTERNAL MEDICINE
Payer: MEDICARE

## 2018-09-26 ENCOUNTER — APPOINTMENT (OUTPATIENT)
Dept: GENERAL RADIOLOGY | Age: 78
DRG: 310 | End: 2018-09-26
Attending: EMERGENCY MEDICINE
Payer: MEDICARE

## 2018-09-26 DIAGNOSIS — E83.42 HYPOMAGNESEMIA: ICD-10-CM

## 2018-09-26 DIAGNOSIS — L03.115 CELLULITIS OF RIGHT ANKLE: ICD-10-CM

## 2018-09-26 DIAGNOSIS — I48.91 ATRIAL FIBRILLATION WITH RVR (HCC): Primary | ICD-10-CM

## 2018-09-26 DIAGNOSIS — E87.6 HYPOKALEMIA: ICD-10-CM

## 2018-09-26 DIAGNOSIS — A41.9 SEPSIS, DUE TO UNSPECIFIED ORGANISM: ICD-10-CM

## 2018-09-26 PROBLEM — M10.9 ACUTE GOUT OF RIGHT ANKLE: Status: ACTIVE | Noted: 2018-09-26

## 2018-09-26 PROBLEM — E78.2 MIXED HYPERLIPIDEMIA: Status: ACTIVE | Noted: 2018-04-27

## 2018-09-26 LAB
ALBUMIN SERPL-MCNC: 2.9 G/DL (ref 3.4–5)
ALBUMIN/GLOB SERPL: 0.6 {RATIO} (ref 0.8–1.7)
ALP SERPL-CCNC: 56 U/L (ref 45–117)
ALT SERPL-CCNC: 39 U/L (ref 13–56)
ANION GAP SERPL CALC-SCNC: 13 MMOL/L (ref 3–18)
APPEARANCE FLD: NORMAL
APPEARANCE UR: CLEAR
AST SERPL-CCNC: 54 U/L (ref 15–37)
BACTERIA URNS QL MICRO: ABNORMAL /HPF
BASOPHILS # BLD: 0 K/UL (ref 0–0.1)
BASOPHILS NFR BLD: 0 % (ref 0–2)
BILIRUB SERPL-MCNC: 1.1 MG/DL (ref 0.2–1)
BILIRUB UR QL: ABNORMAL
BODY FLD TYPE: NORMAL
BUN SERPL-MCNC: 15 MG/DL (ref 7–18)
BUN/CREAT SERPL: 15 (ref 12–20)
CALCIUM SERPL-MCNC: 7.7 MG/DL (ref 8.5–10.1)
CHLORIDE SERPL-SCNC: 97 MMOL/L (ref 100–108)
CO2 SERPL-SCNC: 26 MMOL/L (ref 21–32)
COLOR FLD: NORMAL
COLOR UR: ABNORMAL
CREAT SERPL-MCNC: 0.99 MG/DL (ref 0.6–1.3)
CRP SERPL-MCNC: 24 MG/DL (ref 0–0.3)
CRYSTALS FLD MICRO: NORMAL
DIFFERENTIAL METHOD BLD: ABNORMAL
EOSINOPHIL # BLD: 0 K/UL (ref 0–0.4)
EOSINOPHIL NFR BLD: 0 % (ref 0–5)
EOSINOPHIL NFR FLD MANUAL: 0 %
EPITH CASTS URNS QL MICRO: ABNORMAL /LPF (ref 0–5)
ERYTHROCYTE [DISTWIDTH] IN BLOOD BY AUTOMATED COUNT: 16.9 % (ref 11.6–14.5)
ERYTHROCYTE [SEDIMENTATION RATE] IN BLOOD: 74 MM/HR (ref 0–30)
EST. AVERAGE GLUCOSE BLD GHB EST-MCNC: NORMAL MG/DL
GLOBULIN SER CALC-MCNC: 4.7 G/DL (ref 2–4)
GLUCOSE BLD STRIP.AUTO-MCNC: 161 MG/DL (ref 70–110)
GLUCOSE SERPL-MCNC: 153 MG/DL (ref 74–99)
GLUCOSE UR STRIP.AUTO-MCNC: NEGATIVE MG/DL
HBA1C MFR BLD: 4.3 % (ref 4.2–5.6)
HCT VFR BLD AUTO: 28.6 % (ref 35–45)
HGB BLD-MCNC: 9.9 G/DL (ref 12–16)
HGB UR QL STRIP: NEGATIVE
KETONES UR QL STRIP.AUTO: 15 MG/DL
LACTATE BLD-SCNC: 1.3 MMOL/L (ref 0.4–2)
LACTATE BLD-SCNC: 2.3 MMOL/L (ref 0.4–2)
LEUKOCYTE ESTERASE UR QL STRIP.AUTO: ABNORMAL
LYMPHOCYTES # BLD: 0.8 K/UL (ref 0.9–3.6)
LYMPHOCYTES NFR BLD: 10 % (ref 21–52)
LYMPHOCYTES NFR FLD: 7 %
MAGNESIUM SERPL-MCNC: 1.1 MG/DL (ref 1.6–2.6)
MCH RBC QN AUTO: 33.6 PG (ref 24–34)
MCHC RBC AUTO-ENTMCNC: 34.6 G/DL (ref 31–37)
MCV RBC AUTO: 96.9 FL (ref 74–97)
MONOCYTES # BLD: 1.2 K/UL (ref 0.05–1.2)
MONOCYTES NFR BLD: 16 % (ref 3–10)
MONOCYTES NFR FLD: 10 %
NEUTROPHILS NFR FLD: 83 %
NEUTS BAND # FLD: 0 %
NEUTS SEG # BLD: 5.8 K/UL (ref 1.8–8)
NEUTS SEG NFR BLD: 74 % (ref 40–73)
NITRITE UR QL STRIP.AUTO: POSITIVE
NUC CELL # FLD: NORMAL /CU MM
NUC CELL # FLD: NORMAL /CU MM
PH UR STRIP: 6 [PH] (ref 5–8)
PLATELET # BLD AUTO: 253 K/UL (ref 135–420)
PMV BLD AUTO: 11 FL (ref 9.2–11.8)
POTASSIUM SERPL-SCNC: 2.6 MMOL/L (ref 3.5–5.5)
PROT SERPL-MCNC: 7.6 G/DL (ref 6.4–8.2)
PROT UR STRIP-MCNC: 100 MG/DL
RBC # BLD AUTO: 2.95 M/UL (ref 4.2–5.3)
RBC # FLD: NORMAL /CU MM
RBC #/AREA URNS HPF: 0 /HPF (ref 0–5)
SODIUM SERPL-SCNC: 136 MMOL/L (ref 136–145)
SP GR UR REFRACTOMETRY: 1.02 (ref 1–1.03)
SPECIMEN SOURCE FLD: NORMAL
TSH SERPL DL<=0.05 MIU/L-ACNC: 1.2 UIU/ML (ref 0.36–3.74)
URATE SERPL-MCNC: 7.5 MG/DL (ref 2.6–7.2)
UROBILINOGEN UR QL STRIP.AUTO: 4 EU/DL (ref 0.2–1)
WBC # BLD AUTO: 7.8 K/UL (ref 4.6–13.2)
WBC URNS QL MICRO: ABNORMAL /HPF (ref 0–4)

## 2018-09-26 PROCEDURE — 74011250637 HC RX REV CODE- 250/637: Performed by: EMERGENCY MEDICINE

## 2018-09-26 PROCEDURE — 87040 BLOOD CULTURE FOR BACTERIA: CPT | Performed by: EMERGENCY MEDICINE

## 2018-09-26 PROCEDURE — 83036 HEMOGLOBIN GLYCOSYLATED A1C: CPT | Performed by: INTERNAL MEDICINE

## 2018-09-26 PROCEDURE — 84550 ASSAY OF BLOOD/URIC ACID: CPT | Performed by: INTERNAL MEDICINE

## 2018-09-26 PROCEDURE — 93005 ELECTROCARDIOGRAM TRACING: CPT

## 2018-09-26 PROCEDURE — 74011000250 HC RX REV CODE- 250: Performed by: EMERGENCY MEDICINE

## 2018-09-26 PROCEDURE — 96365 THER/PROPH/DIAG IV INF INIT: CPT

## 2018-09-26 PROCEDURE — 80053 COMPREHEN METABOLIC PANEL: CPT | Performed by: EMERGENCY MEDICINE

## 2018-09-26 PROCEDURE — 65660000000 HC RM CCU STEPDOWN

## 2018-09-26 PROCEDURE — 71045 X-RAY EXAM CHEST 1 VIEW: CPT

## 2018-09-26 PROCEDURE — 89060 EXAM SYNOVIAL FLUID CRYSTALS: CPT | Performed by: EMERGENCY MEDICINE

## 2018-09-26 PROCEDURE — 96375 TX/PRO/DX INJ NEW DRUG ADDON: CPT

## 2018-09-26 PROCEDURE — 74011000258 HC RX REV CODE- 258: Performed by: EMERGENCY MEDICINE

## 2018-09-26 PROCEDURE — 87086 URINE CULTURE/COLONY COUNT: CPT | Performed by: EMERGENCY MEDICINE

## 2018-09-26 PROCEDURE — 74011250637 HC RX REV CODE- 250/637: Performed by: INTERNAL MEDICINE

## 2018-09-26 PROCEDURE — 74011250636 HC RX REV CODE- 250/636: Performed by: INTERNAL MEDICINE

## 2018-09-26 PROCEDURE — 81001 URINALYSIS AUTO W/SCOPE: CPT | Performed by: EMERGENCY MEDICINE

## 2018-09-26 PROCEDURE — 77030005563 HC CATH URETH INT MMGH -A

## 2018-09-26 PROCEDURE — 96376 TX/PRO/DX INJ SAME DRUG ADON: CPT

## 2018-09-26 PROCEDURE — 99285 EMERGENCY DEPT VISIT HI MDM: CPT

## 2018-09-26 PROCEDURE — 51701 INSERT BLADDER CATHETER: CPT

## 2018-09-26 PROCEDURE — 74011250636 HC RX REV CODE- 250/636: Performed by: EMERGENCY MEDICINE

## 2018-09-26 PROCEDURE — 84443 ASSAY THYROID STIM HORMONE: CPT | Performed by: EMERGENCY MEDICINE

## 2018-09-26 PROCEDURE — 83605 ASSAY OF LACTIC ACID: CPT

## 2018-09-26 PROCEDURE — 82962 GLUCOSE BLOOD TEST: CPT

## 2018-09-26 PROCEDURE — 0S9F3ZZ DRAINAGE OF RIGHT ANKLE JOINT, PERCUTANEOUS APPROACH: ICD-10-PCS | Performed by: OBSTETRICS & GYNECOLOGY

## 2018-09-26 PROCEDURE — 85652 RBC SED RATE AUTOMATED: CPT | Performed by: EMERGENCY MEDICINE

## 2018-09-26 PROCEDURE — 86140 C-REACTIVE PROTEIN: CPT | Performed by: EMERGENCY MEDICINE

## 2018-09-26 PROCEDURE — 73610 X-RAY EXAM OF ANKLE: CPT

## 2018-09-26 PROCEDURE — 77030021352 HC CBL LD SYS DISP COVD -B

## 2018-09-26 PROCEDURE — 89051 BODY FLUID CELL COUNT: CPT | Performed by: EMERGENCY MEDICINE

## 2018-09-26 PROCEDURE — 83735 ASSAY OF MAGNESIUM: CPT | Performed by: EMERGENCY MEDICINE

## 2018-09-26 PROCEDURE — 85025 COMPLETE CBC W/AUTO DIFF WBC: CPT | Performed by: EMERGENCY MEDICINE

## 2018-09-26 RX ORDER — POTASSIUM CHLORIDE 1.5 G/1.77G
40 POWDER, FOR SOLUTION ORAL
Status: COMPLETED | OUTPATIENT
Start: 2018-09-26 | End: 2018-09-26

## 2018-09-26 RX ORDER — ATORVASTATIN CALCIUM 40 MG/1
40 TABLET, FILM COATED ORAL DAILY
Status: DISCONTINUED | OUTPATIENT
Start: 2018-09-27 | End: 2018-09-29 | Stop reason: HOSPADM

## 2018-09-26 RX ORDER — MAGNESIUM SULFATE HEPTAHYDRATE 40 MG/ML
2 INJECTION, SOLUTION INTRAVENOUS ONCE
Status: COMPLETED | OUTPATIENT
Start: 2018-09-26 | End: 2018-09-26

## 2018-09-26 RX ORDER — SODIUM CHLORIDE 0.9 % (FLUSH) 0.9 %
5-10 SYRINGE (ML) INJECTION AS NEEDED
Status: DISCONTINUED | OUTPATIENT
Start: 2018-09-26 | End: 2018-09-29 | Stop reason: HOSPADM

## 2018-09-26 RX ORDER — INSULIN LISPRO 100 [IU]/ML
INJECTION, SOLUTION INTRAVENOUS; SUBCUTANEOUS
Status: DISCONTINUED | OUTPATIENT
Start: 2018-09-26 | End: 2018-09-29 | Stop reason: HOSPADM

## 2018-09-26 RX ORDER — DULOXETIN HYDROCHLORIDE 60 MG/1
60 CAPSULE, DELAYED RELEASE ORAL DAILY
Status: DISCONTINUED | OUTPATIENT
Start: 2018-09-27 | End: 2018-09-29 | Stop reason: HOSPADM

## 2018-09-26 RX ORDER — POTASSIUM CHLORIDE 7.45 MG/ML
10 INJECTION INTRAVENOUS
Status: COMPLETED | OUTPATIENT
Start: 2018-09-26 | End: 2018-09-28

## 2018-09-26 RX ORDER — COLCHICINE 0.6 MG/1
0.6 TABLET ORAL DAILY
Status: DISCONTINUED | OUTPATIENT
Start: 2018-09-27 | End: 2018-09-27

## 2018-09-26 RX ORDER — AMOXICILLIN 250 MG
1 CAPSULE ORAL
Status: DISCONTINUED | OUTPATIENT
Start: 2018-09-26 | End: 2018-09-29 | Stop reason: HOSPADM

## 2018-09-26 RX ORDER — VANCOMYCIN 2 GRAM/500 ML IN 0.9 % SODIUM CHLORIDE INTRAVENOUS
2000 ONCE
Status: DISCONTINUED | OUTPATIENT
Start: 2018-09-26 | End: 2018-09-26

## 2018-09-26 RX ORDER — ESOMEPRAZOLE MAGNESIUM 40 MG/1
40 CAPSULE, DELAYED RELEASE ORAL DAILY
Status: DISCONTINUED | OUTPATIENT
Start: 2018-09-27 | End: 2018-09-27 | Stop reason: CLARIF

## 2018-09-26 RX ORDER — HEPARIN SODIUM 5000 [USP'U]/ML
5000 INJECTION, SOLUTION INTRAVENOUS; SUBCUTANEOUS EVERY 8 HOURS
Status: DISCONTINUED | OUTPATIENT
Start: 2018-09-26 | End: 2018-09-26 | Stop reason: SDUPTHER

## 2018-09-26 RX ORDER — ALLOPURINOL 100 MG/1
100 TABLET ORAL DAILY
Status: DISCONTINUED | OUTPATIENT
Start: 2018-09-27 | End: 2018-09-29 | Stop reason: HOSPADM

## 2018-09-26 RX ORDER — ALLOPURINOL 100 MG/1
100 TABLET ORAL DAILY
Status: ON HOLD | COMMUNITY
End: 2018-09-29

## 2018-09-26 RX ORDER — DULOXETIN HYDROCHLORIDE 60 MG/1
60 CAPSULE, DELAYED RELEASE ORAL DAILY
COMMUNITY

## 2018-09-26 RX ORDER — METOPROLOL TARTRATE 50 MG/1
100 TABLET ORAL 2 TIMES DAILY
Status: DISCONTINUED | OUTPATIENT
Start: 2018-09-27 | End: 2018-09-29 | Stop reason: HOSPADM

## 2018-09-26 RX ORDER — ACETAMINOPHEN 500 MG
1000 TABLET ORAL
Status: COMPLETED | OUTPATIENT
Start: 2018-09-26 | End: 2018-09-26

## 2018-09-26 RX ORDER — LANOLIN ALCOHOL/MO/W.PET/CERES
3 CREAM (GRAM) TOPICAL
Status: DISCONTINUED | OUTPATIENT
Start: 2018-09-26 | End: 2018-09-29 | Stop reason: HOSPADM

## 2018-09-26 RX ORDER — HEPARIN SODIUM 5000 [USP'U]/ML
5000 INJECTION, SOLUTION INTRAVENOUS; SUBCUTANEOUS EVERY 8 HOURS
Status: DISCONTINUED | OUTPATIENT
Start: 2018-09-26 | End: 2018-09-29 | Stop reason: HOSPADM

## 2018-09-26 RX ORDER — MAGNESIUM SULFATE HEPTAHYDRATE 40 MG/ML
2 INJECTION, SOLUTION INTRAVENOUS ONCE
Status: DISCONTINUED | OUTPATIENT
Start: 2018-09-26 | End: 2018-09-26 | Stop reason: SDUPTHER

## 2018-09-26 RX ORDER — VANCOMYCIN 2 GRAM/500 ML IN 0.9 % SODIUM CHLORIDE INTRAVENOUS
2000 ONCE
Status: COMPLETED | OUTPATIENT
Start: 2018-09-26 | End: 2018-09-28

## 2018-09-26 RX ORDER — DEXTROSE MONOHYDRATE 25 G/50ML
25-50 INJECTION, SOLUTION INTRAVENOUS AS NEEDED
Status: DISCONTINUED | OUTPATIENT
Start: 2018-09-26 | End: 2018-09-29 | Stop reason: HOSPADM

## 2018-09-26 RX ORDER — GUAIFENESIN 100 MG/5ML
81 LIQUID (ML) ORAL DAILY
Status: DISCONTINUED | OUTPATIENT
Start: 2018-09-27 | End: 2018-09-29 | Stop reason: HOSPADM

## 2018-09-26 RX ORDER — MAGNESIUM SULFATE 100 %
4 CRYSTALS MISCELLANEOUS AS NEEDED
Status: DISCONTINUED | OUTPATIENT
Start: 2018-09-26 | End: 2018-09-29 | Stop reason: HOSPADM

## 2018-09-26 RX ORDER — FEBUXOSTAT 40 MG/1
40 TABLET, FILM COATED ORAL DAILY
Status: DISCONTINUED | OUTPATIENT
Start: 2018-09-27 | End: 2018-09-26

## 2018-09-26 RX ORDER — COLCHICINE 0.6 MG/1
1.2 TABLET ORAL ONCE
Status: COMPLETED | OUTPATIENT
Start: 2018-09-26 | End: 2018-09-26

## 2018-09-26 RX ORDER — DILTIAZEM HYDROCHLORIDE 5 MG/ML
10 INJECTION INTRAVENOUS
Status: COMPLETED | OUTPATIENT
Start: 2018-09-26 | End: 2018-09-26

## 2018-09-26 RX ADMIN — SODIUM CHLORIDE 5 MG/HR: 900 INJECTION, SOLUTION INTRAVENOUS at 19:23

## 2018-09-26 RX ADMIN — HEPARIN SODIUM 5000 UNITS: 5000 INJECTION INTRAVENOUS; SUBCUTANEOUS at 23:35

## 2018-09-26 RX ADMIN — ACETAMINOPHEN 1000 MG: 500 TABLET, FILM COATED ORAL at 17:27

## 2018-09-26 RX ADMIN — MAGNESIUM SULFATE HEPTAHYDRATE 2 G: 40 INJECTION, SOLUTION INTRAVENOUS at 19:03

## 2018-09-26 RX ADMIN — POTASSIUM CHLORIDE 10 MEQ: 7.46 INJECTION, SOLUTION INTRAVENOUS at 19:00

## 2018-09-26 RX ADMIN — POTASSIUM CHLORIDE 40 MEQ: 1.5 POWDER, FOR SOLUTION ORAL at 21:02

## 2018-09-26 RX ADMIN — POTASSIUM CHLORIDE 10 MEQ: 7.46 INJECTION, SOLUTION INTRAVENOUS at 21:16

## 2018-09-26 RX ADMIN — SODIUM CHLORIDE 572 ML: 900 INJECTION, SOLUTION INTRAVENOUS at 18:57

## 2018-09-26 RX ADMIN — MAGNESIUM SULFATE HEPTAHYDRATE 2 G: 40 INJECTION, SOLUTION INTRAVENOUS at 20:03

## 2018-09-26 RX ADMIN — SODIUM CHLORIDE 1000 ML: 900 INJECTION, SOLUTION INTRAVENOUS at 17:04

## 2018-09-26 RX ADMIN — DILTIAZEM HYDROCHLORIDE 10 MG: 5 INJECTION INTRAVENOUS at 18:23

## 2018-09-26 RX ADMIN — POTASSIUM CHLORIDE 10 MEQ: 7.46 INJECTION, SOLUTION INTRAVENOUS at 21:19

## 2018-09-26 RX ADMIN — PIPERACILLIN SODIUM,TAZOBACTAM SODIUM 4.5 G: 4; .5 INJECTION, POWDER, FOR SOLUTION INTRAVENOUS at 20:41

## 2018-09-26 RX ADMIN — COLCHICINE 1.2 MG: 0.6 TABLET, FILM COATED ORAL at 23:32

## 2018-09-26 RX ADMIN — VANCOMYCIN HYDROCHLORIDE 2000 MG: 10 INJECTION, POWDER, LYOPHILIZED, FOR SOLUTION INTRAVENOUS at 21:00

## 2018-09-26 NOTE — IP AVS SNAPSHOT
[address] [address] 
[phone] Patient: Neftaly Salomon MRN: [id number] About your hospitalization You were admitted on:  September 26, 2018 You last received care in the:  [address] [place] [address] You were discharged on:  September 29, 2018 Why you were hospitalized Your primary diagnosis was:  Atrial Fibrillation With Rvr (Hcc) Your diagnoses also included:  Acute Gout Of Right Ankle, Diabetes Mellitus Type 2, Controlled (Hcc), Essential Hypertension, Mixed Hyperlipidemia, Hypokalemia Follow-up Information Follow up With Details Comments Contact Info Farrah Zepeda MD On 10/2/2018 1:15pm [address] [facility] [address] [facility] 83 [id number] 
[phone] Your Scheduled Appointments Sunday September 30, 2018 To Be Determined START OF CARE with Donya Irizarry RN  
[facility] CARE SCHEDULING/INTAKE ([facility]) [address] [place] [address] SCHEDULING/INTAKE ([facility]) Discharge Orders None A check ria indicates which time of day the medication should be taken. My Medications START taking these medications Instructions Each Dose to Equal  
 Morning Noon Evening Bedtime  
 ibuprofen 600 mg tablet Commonly known as:  MOTRIN Take 1 Tab by mouth every six (6) hours as needed. Indications: Pain 600 mg  
    
   
   
   
  
 pantoprazole 40 mg tablet Commonly known as:  PROTONIX Replaces:  NexIUM 40 mg capsule Take 1 Tab by mouth daily. 40 mg CONTINUE taking these medications Instructions Each Dose to Equal  
 Morning Noon Evening Bedtime  
 allopurinol 100 mg tablet Commonly known as:  Norlin Else Take 1 Tab by mouth daily. 100 mg  
    
   
   
   
  
 aspirin 81 mg tablet Take 81 mg by mouth.   
 81 mg  
    
  
   
 atorvastatin 40 mg tablet Commonly known as:  LIPITOR Take 1 Tab by mouth daily. 40 mg  
    
   
   
   
  
 CYMBALTA 60 mg capsule Generic drug:  DULoxetine Take 60 mg by mouth daily. 60 mg  
    
  
   
   
   
  
 metoprolol tartrate 100 mg IR tablet Commonly known as:  LOPRESSOR Take 1 Tab by mouth two (2) times a day. 100 mg  
    
   
   
   
  
  
STOP taking these medications   
 colchicine 0.6 mg tablet NexIUM 40 mg capsule Generic drug:  esomeprazole Replaced by:  pantoprazole 40 mg tablet ULORIC 40 mg Tab tablet Generic drug:  febuxostat Where to Get Your Medications Information on where to get these meds will be given to you by the nurse or doctor. ! Ask your nurse or doctor about these medications  
  allopurinol 100 mg tablet  
 atorvastatin 40 mg tablet  
 ibuprofen 600 mg tablet  
 metoprolol tartrate 100 mg IR tablet  
 pantoprazole 40 mg tablet Discharge Instructions FOLLOW UP VISIT Appointment in: Other (Specify) Follow up with PCP X 1 week DISCHARGE SUMMARY from Nurse PATIENT INSTRUCTIONS: 
 
After general anesthesia or intravenous sedation, for 24 hours or while taking prescription Narcotics: · Limit your activities · Do not drive and operate hazardous machinery · Do not make important personal or business decisions · Do  not drink alcoholic beverages · If you have not urinated within 8 hours after discharge, please contact your surgeon on call. Report the following to your surgeon: 
· Excessive pain, swelling, redness or odor of or around the surgical area · Temperature over 100.5 · Nausea and vomiting lasting longer than 4 hours or if unable to take medications · Any signs of decreased circulation or nerve impairment to extremity: change in color, persistent  numbness, tingling, coldness or increase pain · Any questions What to do at Home: Recommended activity: Activity as tolerated and PT/OT per Home Health,  
 
If you experience any of the following symptoms pain, high blood pressure, irregular heart beat, or any other concerns, please follow up with primary care physician. *  Please give a list of your current medications to your Primary Care Provider. *  Please update this list whenever your medications are discontinued, doses are 
    changed, or new medications (including over-the-counter products) are added. *  Please carry medication information at all times in case of emergency situations. These are general instructions for a healthy lifestyle: No smoking/ No tobacco products/ Avoid exposure to second hand smoke Surgeon General's Warning:  Quitting smoking now greatly reduces serious risk to your health. Obesity, smoking, and sedentary lifestyle greatly increases your risk for illness A healthy diet, regular physical exercise & weight monitoring are important for maintaining a healthy lifestyle You may be retaining fluid if you have a history of heart failure or if you experience any of the following symptoms:  Weight gain of 3 pounds or more overnight or 5 pounds in a week, increased swelling in our hands or feet or shortness of breath while lying flat in bed. Please call your doctor as soon as you notice any of these symptoms; do not wait until your next office visit. Recognize signs and symptoms of STROKE: 
 
F-face looks uneven A-arms unable to move or move unevenly S-speech slurred or non-existent T-time-call 911 as soon as signs and symptoms begin-DO NOT go Back to bed or wait to see if you get better-TIME IS BRAIN. Warning Signs of HEART ATTACK Call 911 if you have these symptoms: 
? Chest discomfort.  Most heart attacks involve discomfort in the center of the chest that lasts more than a few minutes, or that goes away and comes back. It can feel like uncomfortable pressure, squeezing, fullness, or pain. ? Discomfort in other areas of the upper body. Symptoms can include pain or discomfort in one or both arms, the back, neck, jaw, or stomach. ? Shortness of breath with or without chest discomfort. ? Other signs may include breaking out in a cold sweat, nausea, or lightheadedness. Don't wait more than five minutes to call [address]! Fast action can save your life. Calling 911 is almost always the fastest way to get lifesaving treatment. Emergency Medical Services staff can begin treatment when they arrive  up to an hour sooner than if someone gets to the hospital by car. The discharge information has been reviewed with the patient and daughter. The patient and daughter verbalized understanding. Discharge medications reviewed with the patient and daughter and appropriate educational materials and side effects teaching were provided. ___________________________________________________________________________________________________________________________________ Patient armband removed and shredded. [employer] Activation Thank you for enrolling in [facility] [address] Please follow the instructions below to securely access your online medical record. ERCOM allows you to send messages to your doctor, view your test results, renew your prescriptions, schedule appointments, and more. How Do I Sign Up? 1. In your internet browser, go to [email or web address]. 2. Click on the First Time User? Click Here link in the Sign In box. You will see the New Member Sign Up page. 3. Enter your ERCOM Access Code exactly as it appears below. You will not need to use this code after youve completed the sign-up process. If you do not sign up before the expiration date, you must request a new code. ERCOM Access Code: [email or web address] Expires: 12/25/2018  4:24 PM  
 
 4. Enter the last four digits of your Social Security Number (xxxx) and Date of Birth (mm/dd/yyyy) as indicated and click Submit. You will be taken to the next sign-up page. 5. Create a Movea ID. This will be your Movea login ID and cannot be changed, so think of one that is secure and easy to remember. 6. Create a Movea password. You can change your password at any time. 7. Enter your Password Reset Question and Answer. This can be used at a later time if you forget your password. 8. Enter your e-mail address. You will receive e-mail notification when new information is available in [address] 9. Click Sign Up. You can now view your medical record. Additional Information Remember, Movea is NOT to be used for urgent needs. For medical emergencies, dial 911. Now available from your iPhone and Android! Learning About ACE Inhibitors and ARBs for Diabetes Introduction ACE inhibitors and ARBs are medicines used to control blood pressure. They allow blood vessels to relax and open up. This lowers your blood pressure. When you have diabetes, taking an ACE inhibitor or ARB can help to: · Treat high blood pressure. Your risk of problems from diabetes goes up when you have high blood pressure. · Prevent or slow kidney damage. Diabetes can damage the blood vessels in the kidneys. High blood pressure can damage the kidneys, too. · Lower the risks of stroke and heart attack. Your risks go up when you have high blood pressure, heart disease, or both. An ACE inhibitor or ARB is a good choice for people with diabetes. Unlike some medicines, these don't affect blood sugar levels. Examples ACE inhibitors include: · Benazepril. · Lisinopril. · Ramipril. ARBs include: · Irbesartan. · Losartan. · Telmisartan. Possible side effects All medicines can cause side effects. Some side effects of ACE inhibitors include: 
· Low blood pressure. You may feel dizzy and weak. · A cough. · High potassium levels. · An allergic reaction of the skin. Symptoms may range from mild swelling to painful welts. Some side effects of ARBs include: · Diarrhea. · High potassium levels. · Sinus problems. · Stomach problems. You may have other side effects or reactions not listed here. Check the information that comes with your medicine. What to know about taking this medicine · Be safe with medicines. Take your medicines exactly as prescribed. Call your doctor if you think you are having a problem with your medicine. · Before starting an ACE inhibitor or ARB, tell your doctor if you: ¨ Use a salt substitute. ¨ Take diuretics or potassium tablets. · These medicines are not safe for pregnancy. If you are pregnant or planning to be, talk to your doctor about a safe blood pressure medicine. · ACE inhibitors can cause a dry cough. If the cough is bad, talk to your doctor. Switching to an ARB is likely to help. · Taking some medicines together can cause problems. Tell your doctor or pharmacist all the medicines you take. This includes over-the-counter medicines, vitamins, herbal products, and supplements. · You may need regular blood and urine tests. Where can you learn more? Go to http://marshall-tom.info/. Enter M316 in the search box to learn more about \"Learning About ACE Inhibitors and ARBs for Diabetes. \" Current as of: December 7, 2017 Content Version: 11.7 © 7216-6446 [employer]. Care instructions adapted under license by [employer] (which disclaims liability or warranty for this information). If you have questions about a medical condition or this instruction, always ask your healthcare professional. [address] [address] any warranty or liability for your use of this information. Deciding Between Electrical Cardioversion and Rate Control Medicines for Atrial Fibrillation Deciding Between Electrical Cardioversion and Rate Control Medicines for Atrial Fibrillation What is atrial fibrillation? Atrial fibrillation (say \"ROMAN-tree-sushant cxy-dvfa-GEX-shun\") is a kind of uneven heartbeat. It can make you feel lightheaded and dizzy. You may feel weak. It also can make you more likely to have a stroke. Electrical cardioversion can return your heart to a normal rhythm. First you'll get medicines to make you sleepy and control pain. Then your doctor will use patches to send an electric current to your heart. This resets the rhythm of your heart. Not everyone with atrial fibrillation needs this treatment. For some people, taking medicines may be better. Most people can live with an uneven heartbeat. It just has to be kept under control so the heart does not beat too fast. 
Use this information to help you and your doctor decide which treatment to choose for atrial fibrillation. What are enriquez points about this decision? · Electrical cardioversion can return your heart to a normal rhythm. But the problem can come back. The longer you have had atrial fibrillation, the more likely it is to come back after this treatment. · Cardioversion may not work as well when an uneven heartbeat is caused by another heart disease, such as heart failure. · If your symptoms bother you a lot, you may want to try cardioversion. But even if it works, you may still need to take blood thinners to prevent a stroke. · If you don't have symptoms, or if they don't bother you much, you can try medicines to slow your heart rate. And you can take blood thinners to prevent a stroke. · Cardioversion does have risks, such as stroke. Discuss the risks with your doctor. Make sure you understand them. · You may have more than one heart problem. Cardioversion doesn't work as well if you have more than one heart problem. Why might you choose electrical cardioversion? · It restores the normal heart rhythm for most people. · The idea of having an electric shock does not bother you. · Your symptoms bother you a lot. · You have had atrial fibrillation just one time. · You do not have other heart problems. · You may not have to take as many medicines. Or you may not need to take them as long. Why might you choose rate-control medicines? · These medicines keep many people from having symptoms. · You prefer to take medicines rather than have an electric shock. · Your symptoms don't bother you much. · If these medicines don't work, you can still try electrical cardioversion. Your decision Thinking about the facts and your feelings can help you make a decision that is right for you. Be sure you understand the benefits and risks of your options. And think about what else you need to do before you make the decision. Where can you learn more? Go to http://marshall-tom.info/. Enter H469 in the search box to learn more about \"Deciding Between Electrical Cardioversion and Rate Control Medicines for Atrial Fibrillation. \" Current as of: December 6, 2017 Content Version: 11.7 © 4739-9585 [employer]. Care instructions adapted under license by [employer] (which disclaims liability or warranty for this information). If you have questions about a medical condition or this instruction, always ask your healthcare professional. [address] any warranty or liability for your use of this information. Learning About Atrial Fibrillation What is atrial fibrillation? Atrial fibrillation (say \"AY-tree-sushant gpi-qwur-XTY-shun\") is the most common type of irregular heartbeat (arrhythmia). Normally, the heart beats in a strong, steady rhythm. In atrial fibrillation, a problem with the heart's electrical system causes the two upper parts of the heart (the atria) to quiver, or fibrillate.  Your heart rate also may be faster than normal. 
 Atrial fibrillation can be dangerous because if the heartbeat isn't strong and steady, blood can collect, or pool, in the atria. And pooled blood is more likely to form clots. Clots can travel to the brain, block blood flow, and cause a stroke. Atrial fibrillation can also lead to heart failure. Treatment for atrial fibrillation helps prevent stroke and heart failure. It also helps relieve symptoms. Atrial fibrillation is often caused by another heart problem. It may happen after heart surgery. It may also be caused by other problems, such as an overactive thyroid gland or lung disease. Many people with atrial fibrillation are able to live full and active lives. What are the symptoms? Some people feel symptoms when they have episodes of atrial fibrillation. But other people don't notice any symptoms. If you have symptoms, you may feel: · A fluttering, racing, or pounding feeling in your chest called palpitations. · Weak or tired. · Dizzy or lightheaded. · Short of breath. · Chest pain. · Confused. You may notice signs of atrial fibrillation when you check your pulse. Your pulse may seem uneven or fast. 
What can you expect when you have atrial fibrillation? At first, spells of atrial fibrillation may come on suddenly and last a short time. It may go away on its own or it goes away after treatment. This is called paroxysmal atrial fibrillation. Over time, the spells may last longer and occur more often. They often don't go away on their own. How is it treated? Treatments can help you feel better and prevent future problems, especially stroke and heart failure. The main types of treatment slow the heart rate, control the heart rhythm, and help prevent stroke. Your treatment will depend on the cause of your atrial fibrillation, your symptoms, and your risk for stroke. · Heart rate treatment. Medicine may be used to slow your heart rate.  Your heartbeat may still be irregular. But these medicines keep your heart from beating too fast. They may also help relieve your symptoms. · Heart rhythm treatment. Different treatments may be used to try to stop atrial fibrillation and keep it from returning. They can also relieve symptoms. These treatments include medicine, electrical cardioversion to shock the heart back to a normal rhythm, a procedure called catheter ablation, and heart surgery. · Stroke prevention. You and your doctor can decide how to lower your risk. You may decide to take a blood-thinning medicine, such as aspirin or an anticoagulant. How can you live well with it? You can live well and help manage atrial fibrillation by having a heart-healthy lifestyle. To have a heart-healthy lifestyle: · Don't smoke. · Eat heart-healthy foods. · Be active. Talk to your doctor about what type and level of exercise is safe for you. · Stay at a healthy weight. Lose weight if you need to. · Manage stress. · Avoid alcohol if it triggers symptoms. · Manage other health problems such as high blood pressure, high cholesterol, and diabetes. · Avoid getting sick from the flu. Get a flu shot every year. Where can you learn more? Go to http://marshallFiesta Frogtom.info/. Enter [phone] in the search box to learn more about \"Learning About Atrial Fibrillation. \" Current as of: December 6, 2017 Content Version: 11.7 © 8473-6637 [employer]. Care instructions adapted under license by [employer] (which disclaims liability or warranty for this information). If you have questions about a medical condition or this instruction, always ask your healthcare professional. [address] [address] any warranty or liability for your use of this information. Counting Carbohydrates: Care Instructions Your Care Instructions You don't have to eat special foods when you have diabetes.  You just have to be careful to eat healthy foods. Carbohydrates (carbs) raise blood sugar higher and quicker than any other nutrient. Carbs are found in desserts, breads and cereals, and fruit. They're also in starchy vegetables. These include potatoes, corn, and grains such as rice and pasta. Carbs are also in milk and yogurt. The more carbs you eat at one time, the higher your blood sugar will rise. Spreading carbs all through the day helps keep your blood sugar levels within your target range. Counting carbs is one of the best ways to keep your blood sugar under control. If you use insulin, counting carbs helps you match the right amount of insulin to the number of grams of carbs in a meal. Then you can change your diet and insulin dose as needed. Testing your blood sugar several times a day can help you learn how carbs affect your blood sugar. A registered dietitian or certified diabetes educator can help you plan meals and snacks. Follow-up care is a key part of your treatment and safety. Be sure to make and go to all appointments, and call your doctor if you are having problems. It's also a good idea to know your test results and keep a list of the medicines you take. How can you care for yourself at home? Know your daily amount of carbohydrates Your daily amount depends on several things, such as your weight, how active you are, which diabetes medicines you take, and what your goals are for your blood sugar levels. A registered dietitian or certified diabetes educator can help you plan how many carbs to include in each meal and snack. For most adults, a guideline for the daily amount of carbs is: · 45 to 60 grams at each meal. That's about the same as 3 to 4 carbohydrate servings. · 15 to 20 grams at each snack. That's about the same as 1 carbohydrate serving. Count carbs Counting carbs lets you know how much rapid-acting insulin to take before you eat. If you use an insulin pump, you get a constant rate of insulin during the day. So the pump must be programmed at meals. This gives you extra insulin to cover the rise in blood sugar after meals. If you take insulin: 
· Learn your own insulin-to-carb ratio. You and your diabetes health professional will figure out the ratio. You can do this by testing your blood sugar after meals. For example, you may need a certain amount of insulin for every 15 grams of carbs. · Add up the carb grams in a meal. Then you can figure out how many units of insulin to take based on your insulin-to-carb ratio. · Exercise lowers blood sugar. You can use less insulin than you would if you were not doing exercise. Keep in mind that timing matters. If you exercise within 1 hour after a meal, your body may need less insulin for that meal than it would if you exercised 3 hours after the meal. Test your blood sugar to find out how exercise affects your need for insulin. If you do or don't take insulin: 
· Look at labels on packaged foods. This can tell you how many carbs are in a serving. You can also use guides from the American Diabetes Association. · Be aware of portions, or serving sizes. If a package has two servings and you eat the whole package, you need to double the number of grams of carbohydrate listed for one serving. · Protein, fat, and fiber do not raise blood sugar as much as carbs do. If you eat a lot of these nutrients in a meal, your blood sugar will rise more slowly than it would otherwise. Eat from all food groups · Eat at least three meals a day. · Plan meals to include food from all the food groups. The food groups include grains, fruits, dairy, proteins, and vegetables. · Talk to your dietitian or diabetes educator about ways to add limited amounts of sweets into your meal plan. · If you drink alcohol, talk to your doctor. It may not be recommended when you are taking certain diabetes medicines. Where can you learn more? Go to http://marshall-tom.info/. Enter A364 in the search box to learn more about \"Counting Carbohydrates: Care Instructions. \" Current as of: December 7, 2017 Content Version: 11.7 © 8862-0075 [employer]. Care instructions adapted under license by [employer] (which disclaims liability or warranty for this information). If you have questions about a medical condition or this instruction, always ask your healthcare professional. [address] any warranty or liability for your use of this information. Gout: Care Instructions Your Care Instructions Gout is a form of arthritis caused by a buildup of uric acid crystals in a joint. It causes sudden attacks of pain, swelling, redness, and stiffness, usually in one joint, especially the big toe. Gout usually comes on without a cause. But it can be brought on by drinking alcohol (especially beer) or eating seafood and red meat. Taking certain medicines, such as diuretics or aspirin, also can bring on an attack of gout. Taking your medicines as prescribed and following up with your doctor regularly can help you avoid gout attacks in the future. Follow-up care is a key part of your treatment and safety. Be sure to make and go to all appointments, and call your doctor if you are having problems. It's also a good idea to know your test results and keep a list of the medicines you take. How can you care for yourself at home? · If the joint is swollen, put ice or a cold pack on the area for 10 to 20 minutes at a time. Put a thin cloth between the ice and your skin. · Prop up the sore limb on a pillow when you ice it or anytime you sit or lie down during the next 3 days. Try to keep it above the level of your heart. This will help reduce swelling. · Rest sore joints.  Avoid activities that put weight or strain on the joints for a few days. Take short rest breaks from your regular activities during the day. · Take your medicines exactly as prescribed. Call your doctor if you think you are having a problem with your medicine. · Take pain medicines exactly as directed. ¨ If the doctor gave you a prescription medicine for pain, take it as prescribed. ¨ If you are not taking a prescription pain medicine, ask your doctor if you can take an over-the-counter medicine. · Eat less seafood and red meat. · Check with your doctor before drinking alcohol. · Losing weight, if you are overweight, may help reduce attacks of gout. But do not go on a [employer]. \" Losing a lot of weight in a short amount of time can cause a gout attack. When should you call for help? Call your doctor now or seek immediate medical care if: 
  · You have a fever.  
  · The joint is so painful you cannot use it.  
  · You have sudden, unexplained swelling, redness, warmth, or severe pain in one or more joints.  
 Watch closely for changes in your health, and be sure to contact your doctor if: 
  · You have joint pain.  
  · Your symptoms get worse or are not improving after 2 or 3 days. Where can you learn more? Go to http://marshall-tom.info/. Enter D395 in the search box to learn more about \"Gout: Care Instructions. \" Current as of: October 10, 2017 Content Version: 11.7 © 8779-9192 meets. Care instructions adapted under license by [employer] (which disclaims liability or warranty for this information). If you have questions about a medical condition or this instruction, always ask your healthcare professional. [place] [address] any warranty or liability for your use of this information. [employer] Announcement We are excited to announce that we are making your provider's discharge notes available to you in [employer].   You will see these notes when they are completed and signed by the physician that discharged you from your recent hospital stay. If you have any questions or concerns about any information you see in [facility], please call the Health Information Department where you were seen or reach out to your Primary Care Provider for more information about your plan of care. Introducing [facility]! [employer] introduces [facility] patient portal. Now you can access parts of your medical record, email your doctor's office, and request medication refills online. 1. In your internet browser, go to [email or web address] 2. Click on the First Time User? Click Here link in the Sign In box. You will see the New Member Sign Up page. 3. Enter your Stormpath Access Code exactly as it appears below. You will not need to use this code after youve completed the sign-up process. If you do not sign up before the expiration date, you must request a new code. · Stormpath Access Code: [email or web address] Expires: 12/25/2018  4:24 PM 
 
4. Enter the last four digits of your Social Security Number (xxxx) and Date of Birth (mm/dd/yyyy) as indicated and click Submit. You will be taken to the next sign-up page. 5. Create a Stormpath ID. This will be your Stormpath login ID and cannot be changed, so think of one that is secure and easy to remember. 6. Create a Stormpath password. You can change your password at any time. 7. Enter your Password Reset Question and Answer. This can be used at a later time if you forget your password. 8. Enter your e-mail address. You will receive e-mail notification when new information is available in [address] 9. Click Sign Up. You can now view and download portions of your medical record. 10. Click the Download Summary menu link to download a portable copy of your medical information.  
 
If you have questions, please visit the Frequently Asked Questions section of the [facility]. Remember, MyChart is NOT to be used for urgent needs. For medical emergencies, dial 911. Now available from your iPhone and Android! Introducing Bong Pickard As a [employer] [facility] patient, I wanted to make you aware of our electronic visit tool called Bong Pickard. [employer] allows you to connect within minutes with a medical provider 24 hours a day, seven days a week via a mobile device or tablet or logging into a secure website from your computer. You can access Bong Pickard from anywhere in the [place]. A virtual visit might be right for you when you have a simple condition and feel like you just dont want to get out of bed, or cant get away from work for an appointment, when your regular [facility] provider is not available (evenings, weekends or holidays), or when youre out of town and need minor care. Electronic visits cost only $49 and if the [employer] provider determines a prescription is needed to treat your condition, one can be electronically transmitted to a nearby pharmacy*. Please take a moment to enroll today if you have not already done so. The enrollment process is free and takes just a few minutes. To enroll, please download the [employer] anuradha to your tablet or phone, or visit [email or web address] to enroll on your computer. And, as an [address] [place] [address] patient with a [employer] account, the results of your visits will be scanned into your electronic medical record and your primary care provider will be able to view the scanned results. We urge you to continue to see your regular [facility] provider for your ongoing medical care. And while your primary care provider may not be the one available when you seek a Bong Pickard virtual visit, the peace of mind you get from getting a real diagnosis real time can be priceless. For more information on Bong Maderafin, view our Frequently Asked Questions (FAQs) at [email or web address]. Sincerely, 
 
Herminio Cabral MD 
Chief Medical Officer Lm8 Lyndsay Tejeda *:  certain medications cannot be prescribed via Bong Clayabundio Unresulted Labs-Please follow up with your PCP about these lab tests Order Current Status CULTURE, BLOOD Preliminary result CULTURE, BLOOD Preliminary result Providers Seen During Your Hospitalization Provider Specialty Primary office phone Dianne Ramirez MD Emergency Medicine [phone] Ana Junior MD Emergency Medicine [phone] Maria T Edwards DO Hospitalist [phone] Bari Schlatter, MD Family Practice [phone] Immunizations Administered for This Admission Name Date Influenza Vaccine 9/28/2018 Influenza Vaccine (Quad) PF 9/28/2018 Your Primary Care Physician (PCP) Primary Care Physician Office Phone Office Fax Dianne Fordmahnaz, 1966 [facility] [phone] You are allergic to the following No active allergies Recent Documentation Height Weight Breastfeeding? BMI OB Status Smoking Status 1.6 m 75.8 kg No 29.58 kg/m2 Hysterectomy Never Smoker Emergency Contacts Name Discharge Info Relation Home Work Mobile Lemons,Rohith DISCHARGE CAREGIVER [3] Child [2] [phone] CarissaSheryl DISCHARGE CAREGIVER [3] Other Relative [6] [phone] Patient Belongings The following personal items are in your possession at time of discharge: 
  Dental Appliances: None  Visual Aid: None      Home Medications: None   Jewelry: Ring (1 white with white stones, 1 yellow ring)  Clothing: None    Other Valuables: None  Personal Items Sent to Safe: none Please provide this summary of care documentation to your next provider. Signatures-by signing, you are acknowledging that this After Visit Summary has been reviewed with you and you have received a copy. Patient Signature:  ____________________________________________________________ Date:  ____________________________________________________________  
  
Deacon Captain Provider Signature:  ____________________________________________________________ Date:  ____________________________________________________________

## 2018-09-26 NOTE — ED NOTES
Bedside and Verbal shift change report given to Josefina Pelayo (oncoming nurse) by Elizabeth Llanes (offgoing nurse). Report included the following information SBAR.

## 2018-09-26 NOTE — ED NOTES
Vitals:  Patient Vitals for the past 12 hrs:   Temp Pulse Resp BP SpO2   09/27/18 0348 98.9 °F (37.2 °C) 65 16 133/59 98 %   09/27/18 0028 99.1 °F (37.3 °C) 90 20 119/68 100 %   09/26/18 2149 98.1 °F (36.7 °C) (!) 127 20 132/71 100 %   09/26/18 2100 - (!) 128 20 120/89 100 %   09/26/18 2045 - (!) 111 18 131/72 100 %   09/26/18 2030 - (!) 110 20 116/66 99 %   09/26/18 2015 - (!) 102 24 130/90 100 %   09/26/18 2000 - (!) 110 23 137/67 100 %   09/26/18 1945 - 98 24 123/63 100 %   09/26/18 1930 - (!) 130 23 131/57 100 %   09/26/18 1915 - (!) 117 20 141/66 100 %   09/26/18 1823 - (!) 131 - 133/79 -   09/26/18 1734 - (!) 122 17 - 95 %   09/26/18 1730 - (!) 145 - (!) 152/105 (!) 83 %   09/26/18 1725 - (!) 146 - - -   09/26/18 1720 - (!) 152 - - (!) 81 %   09/26/18 1715 - (!) 146 - 116/83 (!) 86 %   09/26/18 1710 - (!) 139 - - -   09/26/18 1705 - (!) 144 - - -   09/26/18 1700 - (!) 136 - (!) 161/104 -   09/26/18 1655 - (!) 135 - - 91 %   09/26/18 1650 - (!) 137 - - 99 %   09/26/18 1645 - (!) 141 - (!) 160/99 -   09/26/18 1640 - (!) 145 - - -   09/26/18 1635 - (!) 146 - - -   09/26/18 1630 - (!) 139 - - -   09/26/18 1625 - (!) 149 17 - -   09/26/18 1622 (!) 101.6 °F (38.7 °C) (!) 119 24 166/76 97 %   09/26/18 1620 - (!) 128 22 - -         Medications ordered:   Medications   sodium chloride (NS) flush 5-10 mL (not administered)   dilTIAZem (CARDIZEM) 100 mg in 0.9% sodium chloride (MBP/ADV) 100 mL infusion (5 mg/hr IntraVENous Rate Verify 9/26/18 2333)   atorvastatin (LIPITOR) tablet 40 mg (not administered)   aspirin chewable tablet 81 mg (not administered)   DULoxetine (CYMBALTA) capsule 60 mg (not administered)   esomeprazole (NEXIUM) capsule 40 mg (not administered)   metoprolol tartrate (LOPRESSOR) tablet 100 mg (not administered)   colchicine tablet 0.6 mg (not administered)   senna-docusate (PERICOLACE) 8.6-50 mg per tablet 1 Tab (not administered)   melatonin tablet 3 mg (not administered)   heparin (porcine) injection 5,000 Units (5,000 Units SubCUTAneous Given 9/26/18 2335)   insulin lispro (HUMALOG) injection (2 Units SubCUTAneous Given 9/27/18 0101)   glucose chewable tablet 16 g (not administered)   glucagon (GLUCAGEN) injection 1 mg (not administered)   dextrose (D50) infusion 12.5-25 g (not administered)   allopurinol (ZYLOPRIM) tablet 100 mg (not administered)   influenza vaccine 2018-19 (6 mos+)(PF) (FLUARIX QUAD/FLULAVAL QUAD) injection 0.5 mL (not administered)   piperacillin-tazobactam (ZOSYN) 3.375 g in 0.9% sodium chloride (MBP/ADV) 100 mL MBP (3.375 g IntraVENous New Bag 9/27/18 0316)   vancomycin (VANCOCIN) 1,250 mg in 0.9% sodium chloride 250 mL IVPB (not administered)   VANCOMYCIN INFORMATION NOTE (not administered)   VANCOMYCIN INFORMATION NOTE (not administered)   sodium chloride 0.9 % bolus infusion 1,000 mL (1,000 mL IntraVENous New Bag 9/26/18 1704)     Followed by   sodium chloride 0.9 % bolus infusion 572 mL (572 mL IntraVENous New Bag 9/26/18 1857)   acetaminophen (TYLENOL) tablet 1,000 mg (1,000 mg Oral Given 9/26/18 1727)   dilTIAZem (CARDIZEM) injection 10 mg (10 mg IntraVENous Given 9/26/18 1823)   potassium chloride 10 mEq in 100 ml IVPB (10 mEq IntraVENous Continued On Admission 9/26/18 2120)   potassium chloride (KLOR-CON) packet 40 mEq (40 mEq Oral Given 9/26/18 2102)   magnesium sulfate 2 g/50 ml IVPB (premix or compounded) (0 g IntraVENous IV Completed 9/26/18 1955)   magnesium sulfate 2 g/50 ml IVPB (premix or compounded) (0 g IntraVENous IV Completed 9/26/18 2018)   vancomycin (VANCOCIN) 2000 mg in  ml infusion (2,000 mg IntraVENous Continued On Admission 9/26/18 2106)   colchicine tablet 1.2 mg (1.2 mg Oral Given 9/26/18 2332)         Lab findings:  Recent Results (from the past 12 hour(s))   EKG, 12 LEAD, INITIAL    Collection Time: 09/26/18  4:26 PM   Result Value Ref Range    Ventricular Rate 144 BPM    Atrial Rate 150 BPM    P-R Interval 152 ms    QRS Duration 70 ms    Q-T Interval 306 ms    QTC Calculation (Bezet) 473 ms    Calculated R Axis 31 degrees    Calculated T Axis 178 degrees    Diagnosis       Sinus tachycardia with occasional premature ventricular complexes  ST & T wave abnormality, consider inferior ischemia  Abnormal ECG  When compared with ECG of 27-APR-2018 15:30,  premature ventricular complexes are now present  Vent. rate has increased BY  59 BPM  ST now depressed in Anterior leads     URINALYSIS W/ RFLX MICROSCOPIC    Collection Time: 09/26/18  4:30 PM   Result Value Ref Range    Color DARK YELLOW      Appearance CLEAR      Specific gravity 1.021 1.005 - 1.030      pH (UA) 6.0 5.0 - 8.0      Protein 100 (A) NEG mg/dL    Glucose NEGATIVE  NEG mg/dL    Ketone 15 (A) NEG mg/dL    Bilirubin MODERATE (A) NEG      Blood NEGATIVE  NEG      Urobilinogen 4.0 (H) 0.2 - 1.0 EU/dL    Nitrites POSITIVE (A) NEG      Leukocyte Esterase TRACE (A) NEG     METABOLIC PANEL, COMPREHENSIVE    Collection Time: 09/26/18  4:30 PM   Result Value Ref Range    Sodium 136 136 - 145 mmol/L    Potassium 2.6 (LL) 3.5 - 5.5 mmol/L    Chloride 97 (L) 100 - 108 mmol/L    CO2 26 21 - 32 mmol/L    Anion gap 13 3.0 - 18 mmol/L    Glucose 153 (H) 74 - 99 mg/dL    BUN 15 7.0 - 18 MG/DL    Creatinine 0.99 0.6 - 1.3 MG/DL    BUN/Creatinine ratio 15 12 - 20      GFR est AA >60 >60 ml/min/1.73m2    GFR est non-AA 55 (L) >60 ml/min/1.73m2    Calcium 7.7 (L) 8.5 - 10.1 MG/DL    Bilirubin, total 1.1 (H) 0.2 - 1.0 MG/DL    ALT (SGPT) 39 13 - 56 U/L    AST (SGOT) 54 (H) 15 - 37 U/L    Alk.  phosphatase 56 45 - 117 U/L    Protein, total 7.6 6.4 - 8.2 g/dL    Albumin 2.9 (L) 3.4 - 5.0 g/dL    Globulin 4.7 (H) 2.0 - 4.0 g/dL    A-G Ratio 0.6 (L) 0.8 - 1.7     CBC WITH AUTOMATED DIFF    Collection Time: 09/26/18  4:30 PM   Result Value Ref Range    WBC 7.8 4.6 - 13.2 K/uL    RBC 2.95 (L) 4.20 - 5.30 M/uL    HGB 9.9 (L) 12.0 - 16.0 g/dL    HCT 28.6 (L) 35.0 - 45.0 %    MCV 96.9 74.0 - 97.0 FL    MCH 33.6 24.0 - 34.0 PG MCHC 34.6 31.0 - 37.0 g/dL    RDW 16.9 (H) 11.6 - 14.5 %    PLATELET 179 992 - 075 K/uL    MPV 11.0 9.2 - 11.8 FL    NEUTROPHILS 74 (H) 40 - 73 %    LYMPHOCYTES 10 (L) 21 - 52 %    MONOCYTES 16 (H) 3 - 10 %    EOSINOPHILS 0 0 - 5 %    BASOPHILS 0 0 - 2 %    ABS. NEUTROPHILS 5.8 1.8 - 8.0 K/UL    ABS. LYMPHOCYTES 0.8 (L) 0.9 - 3.6 K/UL    ABS. MONOCYTES 1.2 0.05 - 1.2 K/UL    ABS. EOSINOPHILS 0.0 0.0 - 0.4 K/UL    ABS.  BASOPHILS 0.0 0.0 - 0.1 K/UL    DF AUTOMATED     SED RATE (ESR)    Collection Time: 09/26/18  4:30 PM   Result Value Ref Range    Sed rate, automated 74 (H) 0 - 30 mm/hr   C REACTIVE PROTEIN, QT    Collection Time: 09/26/18  4:30 PM   Result Value Ref Range    C-Reactive protein 24.0 (H) 0 - 0.3 mg/dL   URINE MICROSCOPIC ONLY    Collection Time: 09/26/18  4:30 PM   Result Value Ref Range    WBC 2 to 3 0 - 4 /hpf    RBC 0 0 - 5 /hpf    Epithelial cells FEW 0 - 5 /lpf    Bacteria 1+ (A) NEG /hpf   MAGNESIUM    Collection Time: 09/26/18  4:30 PM   Result Value Ref Range    Magnesium 1.1 (L) 1.6 - 2.6 mg/dL   TSH 3RD GENERATION    Collection Time: 09/26/18  4:30 PM   Result Value Ref Range    TSH 1.20 0.36 - 3.74 uIU/mL   URIC ACID    Collection Time: 09/26/18  4:30 PM   Result Value Ref Range    Uric acid 7.5 (H) 2.6 - 7.2 MG/DL   HEMOGLOBIN A1C WITH EAG    Collection Time: 09/26/18  4:30 PM   Result Value Ref Range    Hemoglobin A1c 4.3 4.2 - 5.6 %    Est. average glucose Cannot be calculated mg/dL   POC LACTIC ACID    Collection Time: 09/26/18  4:42 PM   Result Value Ref Range    Lactic Acid (POC) 2.3 (HH) 0.4 - 2.0 mmol/L   POC LACTIC ACID    Collection Time: 09/26/18  7:43 PM   Result Value Ref Range    Lactic Acid (POC) 1.3 0.4 - 2.0 mmol/L   CELL COUNT AND DIFF, BODY FLUID    Collection Time: 09/26/18  8:10 PM   Result Value Ref Range    BODY FLUID TYPE SPECIMEN CLOTTED SUGGEST RECOLLECTION     FLUID COLOR SPECIMEN CLOTTED SUGGEST RECOLLECTION     FLUID APPEARANCE SPECIMEN CLOTTED SUGGEST RECOLLECTION     FLUID RBC CT. SPECIMEN CLOTTED SUGGEST RECOLLECTION /cu mm    FLUID WBC COUNT SPECIMEN CLOTTED SUGGEST RECOLLECTION /cu mm    FLUID NUCLEATED CELLS SPECIMEN CLOTTED SUGGEST RECOLLECTION /cu mm    FLD NEUTROPHILS 83 %    FLD BANDS 0 %    FLD LYMPHS 7 %    FLD MONOCYTES 10 %    FLD EOSINS 0 %   CRYSTALS, SYNOVIAL FLUID    Collection Time: 09/26/18  8:10 PM   Result Value Ref Range    FLUID TYPE(7) ANKLE      Crystals, body fluid NO CRYSTALS SEEN     EKG, 12 LEAD, SUBSEQUENT    Collection Time: 09/26/18  9:09 PM   Result Value Ref Range    Ventricular Rate 113 BPM    Atrial Rate 113 BPM    QRS Duration 72 ms    Q-T Interval 344 ms    QTC Calculation (Bezet) 471 ms    Calculated R Axis 0 degrees    Calculated T Axis 53 degrees    Diagnosis       Atrial fibrillation with rapid ventricular response with premature   ventricular or aberrantly conducted complexes  Minimal voltage criteria for LVH, may be normal variant  Nonspecific T wave abnormality , probably digitalis effect  Abnormal ECG  When compared with ECG of 26-SEP-2018 16:26,  Atrial fibrillation has replaced Sinus rhythm  Nonspecific T wave abnormality now evident in Anterior leads     GLUCOSE, POC    Collection Time: 09/26/18 11:44 PM   Result Value Ref Range    Glucose (POC) 161 (H) 70 - 110 mg/dL   POTASSIUM    Collection Time: 09/27/18 12:00 AM   Result Value Ref Range    Potassium 3.5 3.5 - 5.5 mmol/L   MAGNESIUM    Collection Time: 09/27/18 12:00 AM   Result Value Ref Range    Magnesium 2.2 1.6 - 2.6 mg/dL       EKG interpretation by ED Physician:  afib with rvr. Ns tw abnl  Rate 113, qrs72, qtc 471  afib replaced sinus    Cardiac monitor: tachy with irreg rhythm, occ ectopy  Pulse ox: 98% ra    X-Ray, CT or other radiology findings or impressions:  XR CHEST PORT    (Results Pending)   XR ANKLE RT MIN 3 V    (Results Pending)   cxr with nap  Ankle: swelling.  No fx    Progress notes, Consult notes or additional Procedure notes:   D/w  yaya on call for ortho who rec aspiration of joint prior to admin abx    After informed consent verbally from pt with family present I prepped area with chlorohexidine  Local with 1% lido w/o epi. Aspirated approx 1.5cc yellow, bloody fluid only. No further fluid able to be obtained. Nl nv intact. Pt tolerated procedure well    Rate controlled well with cardizem. abx ordered. D/w dr Jennifer Marie who will admit    Pt was originally t/o from dr Chet Richardson to f/u results, admit  ED Critical Care Note    System at risk for life threatening failure: cardiac, pulm, ortho, renal  Associated problems: afib, hypokalemia, hypomag, infection    Critical Care services provided: dysrhythmia management, electrolyte replacement, documentation, consult   Excluded procedures (time not included in critical care): ecg interp, joint aspiration    Total Critical Care Time (in minutes) 68          Reevaluation of patient:   Stable    Disposition:  Diagnosis:   1. Atrial fibrillation with RVR ([facility] [address]    2. Cellulitis of right ankle    3. Sepsis, due to unspecified organism ([address]    4. Hypokalemia    5. Hypomagnesemia        Disposition: admit    Follow-up Information     None            Current Discharge Medication List      CONTINUE these medications which have NOT CHANGED    Details   allopurinol (ZYLOPRIM) 100 mg tablet Take 100 mg by mouth daily. DULoxetine (CYMBALTA) 60 mg capsule Take 60 mg by mouth daily. colchicine 0.6 mg tablet Take 1 tablet by mouth Q1Hour for gout pain. NOT TO EXCEED 3 TABLETS IN 24 HOURS. Qty: 30 Tab, Refills: 0      atorvastatin (LIPITOR) 40 mg tablet Take 40 mg by mouth daily. aspirin 81 mg tablet Take 81 mg by mouth.      metoprolol (LOPRESSOR) 100 mg tablet Take 100 mg by mouth two (2) times a day. esomeprazole (NEXIUM) 40 mg capsule Take 40 mg by mouth daily. febuxostat (ULORIC) 40 mg Tab tablet Take 40 mg by mouth daily.

## 2018-09-26 NOTE — ED PROVIDER NOTES
EMERGENCY DEPARTMENT HISTORY AND PHYSICAL EXAM    4:30 PM      Date: 9/26/2018  Patient Name: Santosh Jensen    History of Presenting Illness     Chief Complaint   Patient presents with    Leg Pain         History Provided By: Patient    Chief Complaint: Leg Pain  Duration:  2 days  Timing:  Constant  Location: Bilateral, right worse than left  Quality: N/A  Severity: Moderate  Modifying Factors: N/A  Associated Symptoms: Bilateral leg swelling. Denies vomiting, diarrhea, constipation, cough, and SOB. Additional History (Context): Santosh Jensen is a 70 y.o. female with a history of hypertension, diabetes, and hypercholesteremia who presents with constant moderate bilateral leg pain and swelling with the right leg worse than the left for 2 days. She denies vomiting, diarrhea, constipation, cough, and SOB. The patient lives with her daughter. Patient arrived to the ED covered in feces. Patient states she has not ambulated in a couple of days.      PCP: Ena Power MD (Inactive)    Current Facility-Administered Medications   Medication Dose Route Frequency Provider Last Rate Last Dose    pantoprazole (PROTONIX) tablet 40 mg  40 mg Oral ACB Rebeca Veliz,         sodium chloride (NS) flush 5-10 mL  5-10 mL IntraVENous PRN Sonya Ma MD        dilTIAZem (CARDIZEM) 100 mg in 0.9% sodium chloride (MBP/ADV) 100 mL infusion  5 mg/hr IntraVENous CONTINUOUS Allyn Alcazar, DO 5 mL/hr at 09/26/18 2333 5 mg/hr at 09/26/18 2333    atorvastatin (LIPITOR) tablet 40 mg  40 mg Oral DAILY Allyn Alcazar, DO        aspirin chewable tablet 81 mg  81 mg Oral DAILY Allyn Alcazar, DO        DULoxetine (CYMBALTA) capsule 60 mg  60 mg Oral DAILY Allyn Grovesars, DO        metoprolol tartrate (LOPRESSOR) tablet 100 mg  100 mg Oral BID Allyn Alcazar, DO        colchicine tablet 0.6 mg  0.6 mg Oral DAILY Ned Veliz DO        senna-docusate (PERICOLACE) 8.6-50 mg per tablet 1 Tab  1 Tab Oral BID PRN Ethusha Veliz, DO        melatonin tablet 3 mg  3 mg Oral QHS PRN Rusty Balsam, DO        heparin (porcine) injection 5,000 Units  5,000 Units SubCUTAneous Q8H Rusty Balsam, DO   5,000 Units at 09/26/18 2335    insulin lispro (HUMALOG) injection   SubCUTAneous AC&HS Rusty Balsam, DO   2 Units at 09/27/18 0101    glucose chewable tablet 16 g  4 Tab Oral PRN Rusty Balsam, DO        glucagon (GLUCAGEN) injection 1 mg  1 mg IntraMUSCular PRN Rusty Balsam, DO        dextrose (D50) infusion 12.5-25 g  25-50 mL IntraVENous PRN Rusty Balsam, DO        allopurinol (ZYLOPRIM) tablet 100 mg  100 mg Oral DAILY Rusty Balsam, DO        influenza vaccine 2018-19 (6 mos+)(PF) (FLUARIX QUAD/FLULAVAL QUAD) injection 0.5 mL  0.5 mL IntraMUSCular PRIOR TO DISCHARGE [place] Veliz,         piperacillin-tazobactam (ZOSYN) 3.375 g in 0.9% sodium chloride (MBP/ADV) 100 mL MBP  3.375 g IntraVENous Q8H Grover Veliz DO 25 mL/hr at 09/27/18 0316 3.375 g at 09/27/18 0316    vancomycin (VANCOCIN) 1,250 mg in 0.9% sodium chloride 250 mL IVPB  1,250 mg IntraVENous Q24H Rusty Balsam, DO        VANCOMYCIN INFORMATION NOTE   Other Rx Dosing/Monitoring Rusty Balsam, DO        [START ON 9/29/2018] VANCOMYCIN INFORMATION NOTE   Other ONCE Rusty Balsam, DO           Past History     Past Medical History:  Past Medical History:   Diagnosis Date    Diabetes ([address]     Hypercholesteremia     Hypertension        Past Surgical History:  Past Surgical History:   Procedure Laterality Date    HX CARPAL TUNNEL RELEASE      HX ORTHOPAEDIC  april 2013    HX OTHER SURGICAL      left arm nerve surgery       Family History:  Family History   Problem Relation Age of Onset    Diabetes Other     Hypertension Other        Social History:  Social History   Substance Use Topics    Smoking status: Never Smoker    Smokeless tobacco: Never Used    Alcohol use Yes       Allergies:  No Known Allergies      Review of Systems       Review of Systems   Constitutional: Positive for appetite change. Respiratory: Negative for cough and shortness of breath. Cardiovascular: Positive for leg swelling. Gastrointestinal: Negative for constipation, diarrhea and vomiting. Musculoskeletal:        Positive for bilateral leg pain. All other systems reviewed and are negative. Physical Exam     Visit Vitals    /48    Pulse 92    Temp 99.4 °F (37.4 °C)    Resp 17    Ht 5' 3\" (1.6 m)    Wt 74.4 kg (164 lb 1.6 oz)    SpO2 100%    Breastfeeding No    BMI 29.07 kg/m2         Physical Exam   Constitutional: She is oriented to person, place, and time. She appears well-developed and well-nourished. No distress. HENT:   Head: Normocephalic and atraumatic. Mouth/Throat: Oropharynx is clear and moist. Mucous membranes are dry. Eyes: Conjunctivae and EOM are normal. Pupils are equal, round, and reactive to light. No scleral icterus. Neck: Normal range of motion. Neck supple. Cardiovascular: Normal heart sounds. Tachycardia present. No murmur heard. Irregularly regular rhythm. Pulmonary/Chest: Effort normal and breath sounds normal. No respiratory distress. Abdominal: Soft. Bowel sounds are normal. She exhibits no distension. There is no tenderness. Musculoskeletal:   Right medial malleolus swollen, tender, and warm. Lymphadenopathy:     She has no cervical adenopathy. Neurological: She is alert and oriented to person, place, and time. Coordination normal.   Skin: Skin is warm and dry. No rash noted. Psychiatric: She has a normal mood and affect. Her behavior is normal.   Nursing note and vitals reviewed.         Diagnostic Study Results     Labs -  Recent Results (from the past 12 hour(s))   EKG, 12 LEAD, SUBSEQUENT    Collection Time: 09/26/18  9:09 PM   Result Value Ref Range    Ventricular Rate 113 BPM    Atrial Rate 113 BPM    QRS Duration 72 ms    Q-T Interval 344 ms    QTC Calculation (Bezet) 471 ms    Calculated R Axis 0 degrees    Calculated T Axis 53 degrees    Diagnosis       Atrial fibrillation with rapid ventricular response with premature   ventricular or aberrantly conducted complexes  Minimal voltage criteria for LVH, may be normal variant  Nonspecific T wave abnormality , probably digitalis effect  Abnormal ECG  When compared with ECG of 26-SEP-2018 16:26,  Atrial fibrillation has replaced Sinus rhythm  Nonspecific T wave abnormality now evident in Anterior leads     GLUCOSE, POC    Collection Time: 09/26/18 11:44 PM   Result Value Ref Range    Glucose (POC) 161 (H) 70 - 110 mg/dL   POTASSIUM    Collection Time: 09/27/18 12:00 AM   Result Value Ref Range    Potassium 3.5 3.5 - 5.5 mmol/L   MAGNESIUM    Collection Time: 09/27/18 12:00 AM   Result Value Ref Range    Magnesium 2.2 1.6 - 2.6 mg/dL   METABOLIC PANEL, BASIC    Collection Time: 09/27/18  3:56 AM   Result Value Ref Range    Sodium 141 136 - 145 mmol/L    Potassium 3.3 (L) 3.5 - 5.5 mmol/L    Chloride 106 100 - 108 mmol/L    CO2 23 21 - 32 mmol/L    Anion gap 12 3.0 - 18 mmol/L    Glucose 78 74 - 99 mg/dL    BUN 15 7.0 - 18 MG/DL    Creatinine 0.74 0.6 - 1.3 MG/DL    BUN/Creatinine ratio 20 12 - 20      GFR est AA >60 >60 ml/min/1.73m2    GFR est non-AA >60 >60 ml/min/1.73m2    Calcium 7.3 (L) 8.5 - 10.1 MG/DL   GLUCOSE, POC    Collection Time: 09/27/18  7:52 AM   Result Value Ref Range    Glucose (POC) 90 70 - 110 mg/dL       Radiologic Studies -   XR CHEST PORT   Final Result      XR ANKLE RT MIN 3 V    (Results Pending)     XR CHEST: Negative        Medical Decision Making   I am the first provider for this patient. I reviewed the vital signs, available nursing notes, past medical history, past surgical history, family history and social history. Vital Signs-Reviewed the patient's vital signs.     Pulse Oximetry Analysis -  97% on room air (Interpretation) WNL    Cardiac Monitor:  Rate: 122  Rhythm:  Sinus Tachycardia    EKG: Interpreted by the EP. Time Interpreted: 16:26   Rate: 144   Rhythm: Sinus Tachycardia   Interpretation: Nonspecific ST changes    Records Reviewed: Nursing Notes and Old Medical Records (Time of Review: 4:30 PM)    ED Course: Progress Notes, Reevaluation, and Consults:    5:13 PM Spoke with family. Patient has history of gout and both feet were more red and swollen yesterday than today. I doubt that's the source for today. Provider Notes (Medical Decision Making):   MDM  Number of Diagnoses or Management Options  Atrial fibrillation with RVR [facility]):   Cellulitis of right ankle:   Hypokalemia:   Hypomagnesemia:   Sepsis, due to unspecified organism [facility]):   Diagnosis management comments: Fevers chills ? Source of septic joint vs gout in lower ext tachycardia ? A fib with RVR fluids started labs ordered HR improved with fluids and cardiazem care transferred to Dr Carmelita Whitlock         Diagnosis     Clinical Impression:   1. Atrial fibrillation with RVR ([facility]    2. Cellulitis of right ankle    3. Sepsis, due to unspecified organism ([address]    4. Hypokalemia    5. Hypomagnesemia        Disposition:     6:00 PM : Pt care transferred to Dr. Carmelita Whitlock, ED provider. History of patient complaint(s), available diagnostic reports and current treatment plan has been discussed thoroughly. Bedside rounding on patient occured : N/A . Intended disposition of patient : TBD  Pending diagnostics reports and/or labs (please list): Labs and right ankle x-ray      Follow-up Information     None           Current Discharge Medication List      CONTINUE these medications which have NOT CHANGED    Details   allopurinol (ZYLOPRIM) 100 mg tablet Take 100 mg by mouth daily. DULoxetine (CYMBALTA) 60 mg capsule Take 60 mg by mouth daily. colchicine 0.6 mg tablet Take 1 tablet by mouth Q1Hour for gout pain. NOT TO EXCEED 3 TABLETS IN 24 HOURS.   Qty: 30 Tab, Refills: 0      atorvastatin (LIPITOR) 40 mg tablet Take 40 mg by mouth daily. aspirin 81 mg tablet Take 81 mg by mouth.      metoprolol (LOPRESSOR) 100 mg tablet Take 100 mg by mouth two (2) times a day. esomeprazole (NEXIUM) 40 mg capsule Take 40 mg by mouth daily. febuxostat (ULORIC) 40 mg Tab tablet Take 40 mg by mouth daily. _______________________________    Attestations:  [place] [address] [place] [address] acting as a scribe for and in the presence of Mae Cano MD      September 27, 2018 at 8:59 AM       Provider Attestation:      I personally performed the services described in the documentation, reviewed the documentation, as recorded by the scribe in my presence, and it accurately and completely records my words and actions.  September 27, 2018 at 8:59 AM - Mae Cano MD    _______________________________

## 2018-09-26 NOTE — ED TRIAGE NOTES
Pt arrives to ED c/o bilat leg pain, +edema, unable to care for self at home, dried stool noted on pt upon arrival to ed. MD notified pt febrile, tachycardic. Pain 8/10 constant aching.

## 2018-09-27 ENCOUNTER — APPOINTMENT (OUTPATIENT)
Dept: GENERAL RADIOLOGY | Age: 78
DRG: 310 | End: 2018-09-27
Attending: FAMILY MEDICINE
Payer: MEDICARE

## 2018-09-27 ENCOUNTER — APPOINTMENT (OUTPATIENT)
Dept: NON INVASIVE DIAGNOSTICS | Age: 78
DRG: 310 | End: 2018-09-27
Attending: INTERNAL MEDICINE
Payer: MEDICARE

## 2018-09-27 LAB
ANION GAP SERPL CALC-SCNC: 12 MMOL/L (ref 3–18)
ATRIAL RATE: 113 BPM
ATRIAL RATE: 150 BPM
ATRIAL RATE: 74 BPM
BUN SERPL-MCNC: 15 MG/DL (ref 7–18)
BUN/CREAT SERPL: 20 (ref 12–20)
CALCIUM SERPL-MCNC: 7.3 MG/DL (ref 8.5–10.1)
CALCULATED R AXIS, ECG10: 0 DEGREES
CALCULATED R AXIS, ECG10: 3 DEGREES
CALCULATED R AXIS, ECG10: 31 DEGREES
CALCULATED T AXIS, ECG11: 178 DEGREES
CALCULATED T AXIS, ECG11: 21 DEGREES
CALCULATED T AXIS, ECG11: 53 DEGREES
CHLORIDE SERPL-SCNC: 106 MMOL/L (ref 100–108)
CO2 SERPL-SCNC: 23 MMOL/L (ref 21–32)
CREAT SERPL-MCNC: 0.74 MG/DL (ref 0.6–1.3)
DIAGNOSIS, 93000: NORMAL
ECHO AV PEAK GRADIENT: 0 MMHG
ECHO AV PEAK VELOCITY: 0 CM/S
ECHO EST RA PRESSURE: 10 MMHG
ECHO LA VOL 2C: 61.37 ML (ref 22–52)
ECHO LA VOL 4C: 82.94 ML (ref 22–52)
ECHO LA VOLUME INDEX A2C: 34.53 ML/M2
ECHO LA VOLUME INDEX A4C: 46.66 ML/M2
ECHO LV EDV A2C: 75.8 ML
ECHO LV EDV A4C: 68.9 ML
ECHO LV EDV BP: 72 ML (ref 56–104)
ECHO LV EDV INDEX A4C: 38.8 ML/M2
ECHO LV EDV INDEX BP: 40.5 ML/M2
ECHO LV EDV NDEX A2C: 42.6 ML/M2
ECHO LV EJECTION FRACTION A2C: 75 %
ECHO LV EJECTION FRACTION A4C: 59 %
ECHO LV EJECTION FRACTION BIPLANE: 67.4 % (ref 55–100)
ECHO LV ESV A2C: 18.8 ML
ECHO LV ESV A4C: 28 ML
ECHO LV ESV BP: 23.5 ML (ref 19–49)
ECHO LV ESV INDEX A2C: 10.6 ML/M2
ECHO LV ESV INDEX A4C: 15.8 ML/M2
ECHO LV ESV INDEX BP: 13.2 ML/M2
ECHO LV INTERNAL DIMENSION DIASTOLIC: 3.98 CM (ref 3.9–5.3)
ECHO LV INTERNAL DIMENSION SYSTOLIC: 3.39 CM
ECHO LV IVSD: 1.11 CM (ref 0.6–0.9)
ECHO LV MASS 2D: 171.7 G (ref 67–162)
ECHO LV MASS INDEX 2D: 96.6 G/M2
ECHO LV POSTERIOR WALL DIASTOLIC: 1.13 CM (ref 0.6–0.9)
ECHO LVOT DIAM: 1.81 CM
ECHO LVOT PEAK GRADIENT: 2.9 MMHG
ECHO LVOT PEAK VELOCITY: 85.38 CM/S
ECHO MV A VELOCITY: 105.05 CM/S
ECHO MV AREA PHT: 4.4 CM2
ECHO MV E DECELERATION TIME (DT): 172.9 MS
ECHO MV E VELOCITY: 0.74 CM/S
ECHO MV E/A RATIO: 0.01
ECHO MV PRESSURE HALF TIME (PHT): 50.1 MS
ECHO PULMONARY ARTERY SYSTOLIC PRESSURE (PASP): 19.5 MMHG
ECHO RIGHT VENTRICULAR SYSTOLIC PRESSURE (RVSP): 19.5 MMHG
ECHO TV REGURGITANT MAX VELOCITY: -154.26 CM/S
ECHO TV REGURGITANT PEAK GRADIENT: 9.5 MMHG
GLUCOSE BLD STRIP.AUTO-MCNC: 103 MG/DL (ref 70–110)
GLUCOSE BLD STRIP.AUTO-MCNC: 120 MG/DL (ref 70–110)
GLUCOSE BLD STRIP.AUTO-MCNC: 165 MG/DL (ref 70–110)
GLUCOSE BLD STRIP.AUTO-MCNC: 90 MG/DL (ref 70–110)
GLUCOSE SERPL-MCNC: 78 MG/DL (ref 74–99)
MAGNESIUM SERPL-MCNC: 2.2 MG/DL (ref 1.6–2.6)
P-R INTERVAL, ECG05: 120 MS
P-R INTERVAL, ECG05: 152 MS
POTASSIUM SERPL-SCNC: 3.3 MMOL/L (ref 3.5–5.5)
POTASSIUM SERPL-SCNC: 3.5 MMOL/L (ref 3.5–5.5)
Q-T INTERVAL, ECG07: 306 MS
Q-T INTERVAL, ECG07: 344 MS
Q-T INTERVAL, ECG07: 466 MS
QRS DURATION, ECG06: 66 MS
QRS DURATION, ECG06: 70 MS
QRS DURATION, ECG06: 72 MS
QTC CALCULATION (BEZET), ECG08: 471 MS
QTC CALCULATION (BEZET), ECG08: 473 MS
QTC CALCULATION (BEZET), ECG08: 517 MS
SODIUM SERPL-SCNC: 141 MMOL/L (ref 136–145)
VENTRICULAR RATE, ECG03: 113 BPM
VENTRICULAR RATE, ECG03: 144 BPM
VENTRICULAR RATE, ECG03: 74 BPM

## 2018-09-27 PROCEDURE — 73562 X-RAY EXAM OF KNEE 3: CPT

## 2018-09-27 PROCEDURE — 82962 GLUCOSE BLOOD TEST: CPT

## 2018-09-27 PROCEDURE — 74011000258 HC RX REV CODE- 258: Performed by: INTERNAL MEDICINE

## 2018-09-27 PROCEDURE — 36415 COLL VENOUS BLD VENIPUNCTURE: CPT | Performed by: INTERNAL MEDICINE

## 2018-09-27 PROCEDURE — 65660000000 HC RM CCU STEPDOWN

## 2018-09-27 PROCEDURE — 84132 ASSAY OF SERUM POTASSIUM: CPT | Performed by: INTERNAL MEDICINE

## 2018-09-27 PROCEDURE — 83735 ASSAY OF MAGNESIUM: CPT | Performed by: INTERNAL MEDICINE

## 2018-09-27 PROCEDURE — 77030038269 HC DRN EXT URIN PURWCK BARD -A

## 2018-09-27 PROCEDURE — 74011250637 HC RX REV CODE- 250/637: Performed by: FAMILY MEDICINE

## 2018-09-27 PROCEDURE — 93005 ELECTROCARDIOGRAM TRACING: CPT

## 2018-09-27 PROCEDURE — 93306 TTE W/DOPPLER COMPLETE: CPT

## 2018-09-27 PROCEDURE — 74011250637 HC RX REV CODE- 250/637: Performed by: INTERNAL MEDICINE

## 2018-09-27 PROCEDURE — 74011636637 HC RX REV CODE- 636/637: Performed by: INTERNAL MEDICINE

## 2018-09-27 PROCEDURE — 74011250636 HC RX REV CODE- 250/636: Performed by: INTERNAL MEDICINE

## 2018-09-27 RX ORDER — ONDANSETRON 4 MG/1
4 TABLET, ORALLY DISINTEGRATING ORAL
Status: DISCONTINUED | OUTPATIENT
Start: 2018-09-27 | End: 2018-09-29 | Stop reason: HOSPADM

## 2018-09-27 RX ORDER — PANTOPRAZOLE SODIUM 40 MG/1
40 TABLET, DELAYED RELEASE ORAL
Status: DISCONTINUED | OUTPATIENT
Start: 2018-09-27 | End: 2018-09-29 | Stop reason: HOSPADM

## 2018-09-27 RX ORDER — POTASSIUM CHLORIDE 750 MG/1
40 TABLET, FILM COATED, EXTENDED RELEASE ORAL
Status: COMPLETED | OUTPATIENT
Start: 2018-09-27 | End: 2018-09-27

## 2018-09-27 RX ORDER — IBUPROFEN 200 MG
600 TABLET ORAL
Status: DISCONTINUED | OUTPATIENT
Start: 2018-09-27 | End: 2018-09-29 | Stop reason: HOSPADM

## 2018-09-27 RX ADMIN — ALLOPURINOL 100 MG: 100 TABLET ORAL at 09:00

## 2018-09-27 RX ADMIN — INSULIN LISPRO 2 UNITS: 100 INJECTION, SOLUTION INTRAVENOUS; SUBCUTANEOUS at 01:01

## 2018-09-27 RX ADMIN — IBUPROFEN 600 MG: 200 TABLET, FILM COATED ORAL at 11:37

## 2018-09-27 RX ADMIN — ATORVASTATIN CALCIUM 40 MG: 40 TABLET, FILM COATED ORAL at 09:00

## 2018-09-27 RX ADMIN — Medication 81 MG: at 09:00

## 2018-09-27 RX ADMIN — PIPERACILLIN SODIUM,TAZOBACTAM SODIUM 3.38 G: 3; .375 INJECTION, POWDER, FOR SOLUTION INTRAVENOUS at 03:16

## 2018-09-27 RX ADMIN — POTASSIUM CHLORIDE 40 MEQ: 750 TABLET, FILM COATED, EXTENDED RELEASE ORAL at 11:37

## 2018-09-27 RX ADMIN — PIPERACILLIN SODIUM,TAZOBACTAM SODIUM 3.38 G: 3; .375 INJECTION, POWDER, FOR SOLUTION INTRAVENOUS at 09:05

## 2018-09-27 RX ADMIN — HEPARIN SODIUM 5000 UNITS: 5000 INJECTION INTRAVENOUS; SUBCUTANEOUS at 23:32

## 2018-09-27 RX ADMIN — INSULIN LISPRO 2 UNITS: 100 INJECTION, SOLUTION INTRAVENOUS; SUBCUTANEOUS at 23:32

## 2018-09-27 RX ADMIN — HEPARIN SODIUM 5000 UNITS: 5000 INJECTION INTRAVENOUS; SUBCUTANEOUS at 09:00

## 2018-09-27 RX ADMIN — PANTOPRAZOLE SODIUM 40 MG: 40 TABLET, DELAYED RELEASE ORAL at 09:00

## 2018-09-27 RX ADMIN — METOPROLOL TARTRATE 100 MG: 50 TABLET ORAL at 17:08

## 2018-09-27 RX ADMIN — COLCHICINE 0.6 MG: 0.6 TABLET, FILM COATED ORAL at 09:00

## 2018-09-27 RX ADMIN — DULOXETINE HYDROCHLORIDE 60 MG: 60 CAPSULE, DELAYED RELEASE ORAL at 09:00

## 2018-09-27 RX ADMIN — PIPERACILLIN SODIUM,TAZOBACTAM SODIUM 3.38 G: 3; .375 INJECTION, POWDER, FOR SOLUTION INTRAVENOUS at 17:08

## 2018-09-27 RX ADMIN — SODIUM CHLORIDE 1250 MG: 900 INJECTION, SOLUTION INTRAVENOUS at 20:25

## 2018-09-27 RX ADMIN — ONDANSETRON 4 MG: 4 TABLET, ORALLY DISINTEGRATING ORAL at 15:34

## 2018-09-27 RX ADMIN — HEPARIN SODIUM 5000 UNITS: 5000 INJECTION INTRAVENOUS; SUBCUTANEOUS at 15:35

## 2018-09-27 RX ADMIN — METOPROLOL TARTRATE 100 MG: 50 TABLET ORAL at 08:59

## 2018-09-27 NOTE — H&P
Medicine History and Physical    Patient: Faustino Davis   Age:  70 y.o. CODE Status:  Full    Assessment/Plan: In summary, 78F pMHx Afib, HTN, HLD, DM2, gout presented to ED with bilat LE pain and edema. Principal Problem:    Atrial fibrillation with RVR ([facility] (9/26/2018): Tmax 101° per report, not recorded. HR 130s. TSH 1.20. CXR no infiltrate or effusion per this writer's read. EKG afibrillation, rate 140/min, normal axis, good R-wave progression / transition, no ST-T wave changes of ischemia. Last ECHO 4/2018 with normal LVEF.  - Dilt gtt  - Continue home lopressor 100mg BID, prefer to d/c dilt once rate controlled to avoid BB and CCB  - Goal HR /min  - Telemetry  - Mgt of gout / cystitis as below    Active Problems:    Acute gout of right ankle (9/26/2018): Tmax as above. No leukocytosis, mild left shift, no bandemia. ESR 74. CRP 24.0. Lactic acid 2.3 --> 1.3. DDx: gout vs cellulitis; doubt cellulitis. - Arthrocentesis per ED - studies pending  - In ED, given Vanco and zosyn  - ABx: Vanco and Zosyn for now pending cx data  - Continue allopurinol 100mg Qday - was not taking Uloric  - Add colchicine for acute flare  - Suspect patient noncompliant with chronic gout medications      ? Acute cystitis:  U/A with only 2-3 WBC, but with trace LE and positive nitrite. - Urine cx pending  - ABx: as above      Diabetes mellitus type 2, controlled ([facility] (4/27/2018):  - Hemoglobin A1c  - Glucose check QAC/HS  - Correctional scale insulin lispro, low dose  - Hypoglycemia protocol      Essential hypertension (4/27/2018): Lopressor 100mg BID. Mixed hyperlipidemia (4/27/2018): Lipitor 40mg QHS. Depression / Anxiety:  Cymbalta 60mg Qday    Fluids: None  E-lytes: BMP with K 2.6. Mg 1.1. Replaced in ED (only give 30mEq of KCl), check both this evening.     Nutrition: Cardiac diet  Prophylaxis: DVT: Hep subq tid  GI: PPI Bowel: Senna-s bid prn constipation    DISPO:  Patient to be admitted  at this time for reasons addressed above, continued hospitalization for ongoing assessment and treatment indicated     Anticipated Date of Discharge: 9/29/18  Anticipated Disposition (home, SNF) : Home with home health    Chief Complaint:   Chief Complaint   Patient presents with    Leg Pain         HPI:     Neftaly Salomon is a 70y.o. year old female who presents with  Bilateral LE pain and edema with inability to care for self at home. I have been having swelling in my feet for  A couple days. I thought this was gout; I have had this before. I did not take any gout medicine. I have pain in bilateral feet, rated at 6/10. Pain is worse with pressure / touching / walking. Pain in right foot and left great toe.     Review of Systems:  History obtained from chart review and the patient  General ROS: negative  Respiratory ROS: negative  Cardiovascular ROS: negative  Gastrointestinal ROS: no abdominal pain, change in bowel habits, or black or bloody stools  negative  Genito-Urinary ROS: negative  Musculoskeletal ROS: positive for - joint pain  Neurological ROS: no TIA or stroke symptoms  Dermatological ROS: positive for - redness left foot    Past Medical History:  Past Medical History:   Diagnosis Date    Diabetes ([address]     Hypercholesteremia     Hypertension        Past Surgical History:  Past Surgical History:   Procedure Laterality Date    HX CARPAL TUNNEL RELEASE      HX ORTHOPAEDIC  april 2013    HX OTHER SURGICAL      left arm nerve surgery       Family History:  Family History   Problem Relation Age of Onset    Diabetes Other     Hypertension Other        Social History:  Social History     Social History    Marital status:      Spouse name: N/A    Number of children: N/A    Years of education: N/A     Social History Main Topics    Smoking status: Never Smoker    Smokeless tobacco: Never Used    Alcohol use Yes    Drug use: No    Sexual activity: Not Asked     Other Topics Concern    None Social History Narrative       Home Medications:  Prior to Admission medications    Medication Sig Start Date End Date Taking? Authorizing Provider   allopurinol (ZYLOPRIM) 100 mg tablet Take 100 mg by mouth daily. Yes Jl Cotton MD   DULoxetine (CYMBALTA) 60 mg capsule Take 60 mg by mouth daily. Yes Jl Cotton MD   atorvastatin (LIPITOR) 40 mg tablet Take 40 mg by mouth daily. Yes Jl Cotton MD   colchicine 0.6 mg tablet Take 1 tablet by mouth Q1Hour for gout pain. NOT TO EXCEED 3 TABLETS IN 24 HOURS. 1/21/17   Joshua Huynh NP   metoprolol (LOPRESSOR) 100 mg tablet Take 100 mg by mouth two (2) times a day. Jl Cotton MD   esomeprazole (NEXIUM) 40 mg capsule Take 40 mg by mouth daily. Jl Cotton MD   aspirin 81 mg tablet Take 81 mg by mouth. Jl Cotton MD   febuxostat (ULORIC) 40 mg Tab tablet Take 40 mg by mouth daily. Jl Cotton MD       Allergies:  No Known Allergies      Physical Exam:     Visit Vitals    /79    Pulse (!) 131    Temp (!) 101.6 °F (38.7 °C)    Resp 17    Ht 5' 3\" (1.6 m)    Wt 72.6 kg (160 lb)    SpO2 95%    BMI 28.34 kg/m2       Physical Exam:  General appearance: alert, cooperative, no distress, appears stated age  Head: Normocephalic, without obvious abnormality, atraumatic  Eyes: negative  Lungs: clear to auscultation bilaterally  Heart: irregularly irregular rhythm, S1, S2 normal  Abdomen: soft, non-tender. Bowel sounds normal. No masses,  no organomegaly  Extremities: Pain right ankle, left great toe  Pulses: 2+ and symmetric  Skin: redness right ankle and left great toe  Neurologic: Alert and oriented X 3, normal strength and tone. Normal symmetric reflexes. Normal coordination and gait    Intake and Output:  Current Shift:     Last three shifts:       Lab/Data Reviewed: All lab results for the last 24 hours reviewed.       Leesa Gray DO    September 26, 2018

## 2018-09-27 NOTE — PROGRESS NOTES
Nutrition initial assessment/Plan of care      RECOMMENDATIONS:   1. Cardiac Diet  2. Ensure TID  3. Monitor weight, labs and PO intake  4. RD to follow     GOALS:   1. PO intake meets >75% of protein/calorie needs by 10/2  2. Weight Maintenance (+/- 1-2 lb) by 10/4    ASSESSMENT:   Wt status is classified as overweight per BMI of 29.1. However, per documented weight records Pt was 179 on (4/29/2018) equating to a 15 lb or 8.3% loss x 5 months. Poor PO intake. Labs noted. BG range () over the past 24 hours. Pt w/ hypokalemia, hypocalcemia and elevated uric acid (7.5). Nutrition recommendations listed. RD to follow. Nutrition Diagnoses:   Unintentional weight loss related to inadequate energy intake PTA as evidenced by at least a 15 lb or 8.3% loss x 5 months. Inadequate energy intake related to decreased appetite as evidenced by Pt recall of nothing to eat x 2 days. Nutrition Risk:  [] High  [x] Moderate []  Low    SUBJECTIVE/OBJECTIVE:    Pt admitted for Afib. PMHx including Afib, HTN, HLD, DM2 and gout. Noted Pt w/ BLE edema. Pt seen in room with daughter at bedside to assist w/ Hx. Denies having any food allergies or problems chewing/swallowing. Reports having a poor appetite for at least the past 2 weeks and confirmed weight loss. Pt stated she had gotten up to 208 lb a year ago. Will order Ensure TID to supplement energy needs. Encouraged Pt to work with dietary to [employer] preferences as well as increasing PO intake. Will monitor. Information Obtained from:    [x] Chart Review   [x] Patient   [x] Family/Caregiver   [] Nurse/Physician   [] Interdisciplinary Meeting/Rounds      Diet: Cardiac Diet  Medications: [x] Reviewed    Allergies: [x] Reviewed   Encounter Diagnoses     ICD-10-CM ICD-9-CM   1. Atrial fibrillation with RVR (HCC) I48.91 427.31   2. Cellulitis of right ankle L03.115 682.6   3. Sepsis, due to unspecified organism ([facility] A41.9 038.9     995.91   4.  Hypokalemia E87.6 276.8   5. Hypomagnesemia E83.42 275.2     Past Medical History:   Diagnosis Date    Diabetes ([facility]     Hypercholesteremia     Hypertension       Labs:    Lab Results   Component Value Date/Time    Sodium 141 09/27/2018 03:56 AM    Potassium 3.3 (L) 09/27/2018 03:56 AM    Chloride 106 09/27/2018 03:56 AM    CO2 23 09/27/2018 03:56 AM    Anion gap 12 09/27/2018 03:56 AM    Glucose 78 09/27/2018 03:56 AM    BUN 15 09/27/2018 03:56 AM    Creatinine 0.74 09/27/2018 03:56 AM    Calcium 7.3 (L) 09/27/2018 03:56 AM    Magnesium 2.2 09/27/2018 12:00 AM    Phosphorus 3.0 04/29/2018 04:15 AM    Albumin 2.9 (L) 09/26/2018 04:30 PM     Anthropometrics: BMI (calculated): 29.1  Last 3 Recorded Weights in this Encounter    09/26/18 1622 09/27/18 0348   Weight: 72.6 kg (160 lb) 74.4 kg (164 lb 1.6 oz)      Ht Readings from Last 1 Encounters:   09/26/18 5' 3\" (1.6 m)     Weight Metrics 9/27/2018 4/29/2018 1/21/2017 6/25/2013   Weight 164 lb 1.6 oz 179 lb 0.2 oz 185 lb 182 lb   BMI 29.07 kg/m2 30.73 kg/m2 32.77 kg/m2 32.25 kg/m2       No data found. IBW: 115 lb %IBW: 143%   [x] Weight Loss [] Weight Gain [] Weight Stable    Estimated Nutrition Needs: [x] MSJ  [] Other:  Calories: 1566 kcal Based on:   [x] Actual BW    Protein:   75 g Based on:   [x] Actual BW    Fluid:       8282-8640 ml Based on:   [x] Actual BW      [x] No Cultural, Pentecostalism or ethnic dietary need identified.     [] Cultural, Pentecostalism and ethnic food preferences identified and addressed     Wt Status:  [] Normal (18.6 - 24.9) [] Underweight (<18.5) [x] Overweight (25 - 29.9) [] Mild Obesity (30 - 34.9)  [] Moderate Obesity (35 - 39.9) [] Morbid Obesity (40+)       Nutrition Problems Identified:   [x] Suboptimal PO intake   [] Food Allergies  [] Difficulty chewing/swallowing/poor dentition  [] Constipation/Diarrhea   [] Nausea/Vomiting   [] None  [] Other:     Plan:   [x] Therapeutic Diet  []  Obtained/adjusted food preferences/tolerances and/or snacks options   [x]  Supplements added   [] Occupational therapy following for feeding techniques  []  HS snack added   []  Modify diet texture   []  Modify diet for food allergies   []  Educate patient   []  Assist with menu selection   [x]  Monitor PO intake on meal rounds   [x]  Continue inpatient monitoring and intervention   []  Participated in discharge planning/Interdisciplinary rounds/Team meetings   []  Other:     Education Needs:   [] Not appropriate for teaching at this time due to:   [x] Identified and addressed    Nutrition Monitoring and Evaluation:  [x] Continue ongoing monitoring and intervention  [] Lula Kirkland

## 2018-09-27 NOTE — PROGRESS NOTES
Problem: Falls - Risk of  Goal: *Absence of Falls  Document Sam Fall Risk and appropriate interventions in the flowsheet. Outcome: Progressing Towards Goal  Fall Risk Interventions:            Medication Interventions: Patient to call before getting OOB    Elimination Interventions: Call light in reach             Problem: Diabetes Self-Management  Goal: *Incorporating physical activity into lifestyle  State effect of exercise on blood glucose levels.    Outcome: Not Met  Pt remains bed bound

## 2018-09-27 NOTE — INTERDISCIPLINARY ROUNDS
IDR Summary      Patient: Santosh Jensen MRN: [id number]    Age: 70 y.o.  : 1940     Admit Diagnosis: Atrial fibrillation with RVR ([address]        DIET status: Cardiac     Lines/Tubes:   IV: YES   Needed: YES  Valdivia: NO  Needed:NO  Central Line: NO Needed: NO      VTE Prophylaxis: Chemical    Mobility needs: Yes     PT ordered:  YES PT eval on chart: YES    OT ordered:  YES OT eval on chart: YES      ST ordered:  NO ST eval on chart:  NO     Disposition/Care Management:  Discharge plan:        Barriers to discharge:   Financial concerns:No   PCP: Ena Power MD (Inactive)    : NO  Interventions:   D/c cardizem gtt-rate controlled     LOS: 1 days     Expected days until discharge:            Signed:     GOPAL Mandujano   [id number]  Hospitalist Division  Pager:  [phone]   Office:  [phone]

## 2018-09-27 NOTE — ED NOTES
TRANSFER - ED to INPATIENT REPORT:    SBAR report made available to receiving floor on this patient being transferred to [address] [place] [address] ([facility])  for routine progression of care       Admitting diagnosis Atrial fibrillation with RVR ([facility] [address]    Information from the following report(s) SBAR was made available to receiving floor. Lines:   Peripheral IV 09/26/18 Left Arm (Active)   Site Assessment Clean, dry, & intact 9/26/2018  4:26 PM   Phlebitis Assessment 0 9/26/2018  4:26 PM   Infiltration Assessment 0 9/26/2018  4:26 PM   Dressing Status Clean, dry, & intact 9/26/2018  4:26 PM   Dressing Type Transparent 9/26/2018  4:26 PM   Hub Color/Line Status Pink 9/26/2018  4:26 PM       Peripheral IV 09/26/18 Right Antecubital (Active)   Site Assessment Clean, dry, & intact 9/26/2018  4:30 PM   Phlebitis Assessment 0 9/26/2018  4:30 PM   Infiltration Assessment 0 9/26/2018  4:30 PM   Dressing Status Clean, dry, & intact 9/26/2018  4:30 PM   Dressing Type Transparent 9/26/2018  4:30 PM   Hub Color/Line Status Pink 9/26/2018  4:30 PM       Peripheral IV 09/26/18 Left Antecubital (Active)   Site Assessment Clean, dry, & intact 9/26/2018  6:46 PM   Phlebitis Assessment 0 9/26/2018  6:46 PM   Infiltration Assessment 0 9/26/2018  6:46 PM   Dressing Status Clean, dry, & intact 9/26/2018  6:46 PM   Dressing Type Transparent 9/26/2018  6:46 PM   Hub Color/Line Status Pink;Flushed 9/26/2018  6:46 PM        Medication list confirmed with patient    Opportunity for questions and clarification was provided.       Patient is oriented to time, place, person and situation High alert med: cardizem at 5mL/hr  Patient is  incontinent and ambulatory with assist     Valuables transported with patient     Patient transported with:   Monitor  Registered Nurse

## 2018-09-27 NOTE — PROGRESS NOTES
Progress Note      Patient: Jeanne Dejesus               Sex: female          DOA: 9/26/2018       YOB: 1940      Age:  70 y.o.        LOS:  LOS: 1 day               Subjective / Interval Hx  I    Jeanne Dejesus is a 70 y.o. female  who presents with hx hypertension , dm , hyperlipidemia , gout who presented in ED with c/o bilateral leg pain and swelling worse on the right. She was noted to be in AFIB/RVR and started on Cardizem IV . Patient was started on IV vancomycin and Zosyn for presumed cellulitis vs gout flare , however arthrocentesis for right ankle was negative for crystals. She also c/o right knee pain will get XR Right knee today. She is afebrile. ECHO ordered for new afib is pending. Magnesium and Potassium are been replaced. TSH wnl . Cardiology will be consulted this AM.       Objective:      Visit Vitals    /48    Pulse 92    Temp 99.4 °F (37.4 °C)    Resp 17    Ht 5' 3\" (1.6 m)    Wt 74.4 kg (164 lb 1.6 oz)    SpO2 100%    Breastfeeding No    BMI 29.07 kg/m2             Physical Exam   Constitutional: She is oriented to person, place, and time. She appears well-developed and well-nourished. No distress. HENT:   Head: Normocephalic and atraumatic. Mouth/Throat: No oropharyngeal exudate. Eyes: Conjunctivae are normal. No scleral icterus. Neck: Neck supple. Cardiovascular: S1 normal, S2 normal and normal heart sounds. An irregularly irregular rhythm present. Tachycardia present. No murmur heard. Pulmonary/Chest: Effort normal and breath sounds normal. No respiratory distress. She has no wheezes. She has no rales. Abdominal: Soft. Bowel sounds are normal. She exhibits no distension. There is no tenderness. There is no rebound and no guarding. Musculoskeletal: She exhibits edema (ankle ). Neurological: She is alert and oriented to person, place, and time. Skin: Skin is warm. No rash noted. She is not diaphoretic. No erythema. No pallor. Psychiatric: She has a normal mood and affect. Intake and Output:  Current Shift:     Last three shifts:  09/25 1901 - 09/27 0700  In: 158.7 [I.V.:158.7]  Out: -     Recent Results (from the past 48 hour(s))   EKG, 12 LEAD, INITIAL    Collection Time: 09/26/18  4:26 PM   Result Value Ref Range    Ventricular Rate 144 BPM    Atrial Rate 150 BPM    P-R Interval 152 ms    QRS Duration 70 ms    Q-T Interval 306 ms    QTC Calculation (Bezet) 473 ms    Calculated R Axis 31 degrees    Calculated T Axis 178 degrees    Diagnosis       Sinus tachycardia with occasional premature ventricular complexes  ST & T wave abnormality, consider inferior ischemia  Abnormal ECG  When compared with ECG of 27-APR-2018 15:30,  premature ventricular complexes are now present  Vent. rate has increased BY  59 BPM  ST now depressed in Anterior leads     URINALYSIS W/ RFLX MICROSCOPIC    Collection Time: 09/26/18  4:30 PM   Result Value Ref Range    Color DARK YELLOW      Appearance CLEAR      Specific gravity 1.021 1.005 - 1.030      pH (UA) 6.0 5.0 - 8.0      Protein 100 (A) NEG mg/dL    Glucose NEGATIVE  NEG mg/dL    Ketone 15 (A) NEG mg/dL    Bilirubin MODERATE (A) NEG      Blood NEGATIVE  NEG      Urobilinogen 4.0 (H) 0.2 - 1.0 EU/dL    Nitrites POSITIVE (A) NEG      Leukocyte Esterase TRACE (A) NEG     METABOLIC PANEL, COMPREHENSIVE    Collection Time: 09/26/18  4:30 PM   Result Value Ref Range    Sodium 136 136 - 145 mmol/L    Potassium 2.6 (LL) 3.5 - 5.5 mmol/L    Chloride 97 (L) 100 - 108 mmol/L    CO2 26 21 - 32 mmol/L    Anion gap 13 3.0 - 18 mmol/L    Glucose 153 (H) 74 - 99 mg/dL    BUN 15 7.0 - 18 MG/DL    Creatinine 0.99 0.6 - 1.3 MG/DL    BUN/Creatinine ratio 15 12 - 20      GFR est AA >60 >60 ml/min/1.73m2    GFR est non-AA 55 (L) >60 ml/min/1.73m2    Calcium 7.7 (L) 8.5 - 10.1 MG/DL    Bilirubin, total 1.1 (H) 0.2 - 1.0 MG/DL    ALT (SGPT) 39 13 - 56 U/L    AST (SGOT) 54 (H) 15 - 37 U/L    Alk.  phosphatase 56 45 - 117 U/L    Protein, total 7.6 6.4 - 8.2 g/dL    Albumin 2.9 (L) 3.4 - 5.0 g/dL    Globulin 4.7 (H) 2.0 - 4.0 g/dL    A-G Ratio 0.6 (L) 0.8 - 1.7     CBC WITH AUTOMATED DIFF    Collection Time: 09/26/18  4:30 PM   Result Value Ref Range    WBC 7.8 4.6 - 13.2 K/uL    RBC 2.95 (L) 4.20 - 5.30 M/uL    HGB 9.9 (L) 12.0 - 16.0 g/dL    HCT 28.6 (L) 35.0 - 45.0 %    MCV 96.9 74.0 - 97.0 FL    MCH 33.6 24.0 - 34.0 PG    MCHC 34.6 31.0 - 37.0 g/dL    RDW 16.9 (H) 11.6 - 14.5 %    PLATELET 237 356 - 441 K/uL    MPV 11.0 9.2 - 11.8 FL    NEUTROPHILS 74 (H) 40 - 73 %    LYMPHOCYTES 10 (L) 21 - 52 %    MONOCYTES 16 (H) 3 - 10 %    EOSINOPHILS 0 0 - 5 %    BASOPHILS 0 0 - 2 %    ABS. NEUTROPHILS 5.8 1.8 - 8.0 K/UL    ABS. LYMPHOCYTES 0.8 (L) 0.9 - 3.6 K/UL    ABS. MONOCYTES 1.2 0.05 - 1.2 K/UL    ABS. EOSINOPHILS 0.0 0.0 - 0.4 K/UL    ABS.  BASOPHILS 0.0 0.0 - 0.1 K/UL    DF AUTOMATED     SED RATE (ESR)    Collection Time: 09/26/18  4:30 PM   Result Value Ref Range    Sed rate, automated 74 (H) 0 - 30 mm/hr   C REACTIVE PROTEIN, QT    Collection Time: 09/26/18  4:30 PM   Result Value Ref Range    C-Reactive protein 24.0 (H) 0 - 0.3 mg/dL   URINE MICROSCOPIC ONLY    Collection Time: 09/26/18  4:30 PM   Result Value Ref Range    WBC 2 to 3 0 - 4 /hpf    RBC 0 0 - 5 /hpf    Epithelial cells FEW 0 - 5 /lpf    Bacteria 1+ (A) NEG /hpf   MAGNESIUM    Collection Time: 09/26/18  4:30 PM   Result Value Ref Range    Magnesium 1.1 (L) 1.6 - 2.6 mg/dL   TSH 3RD GENERATION    Collection Time: 09/26/18  4:30 PM   Result Value Ref Range    TSH 1.20 0.36 - 3.74 uIU/mL   URIC ACID    Collection Time: 09/26/18  4:30 PM   Result Value Ref Range    Uric acid 7.5 (H) 2.6 - 7.2 MG/DL   HEMOGLOBIN A1C WITH EAG    Collection Time: 09/26/18  4:30 PM   Result Value Ref Range    Hemoglobin A1c 4.3 4.2 - 5.6 %    Est. average glucose Cannot be calculated mg/dL   POC LACTIC ACID    Collection Time: 09/26/18  4:42 PM   Result Value Ref Range    Lactic Acid (POC) 2.3 (HH) 0.4 - 2.0 mmol/L   POC LACTIC ACID    Collection Time: 09/26/18  7:43 PM   Result Value Ref Range    Lactic Acid (POC) 1.3 0.4 - 2.0 mmol/L   CELL COUNT AND DIFF, BODY FLUID    Collection Time: 09/26/18  8:10 PM   Result Value Ref Range    BODY FLUID TYPE SPECIMEN CLOTTED SUGGEST RECOLLECTION     FLUID COLOR SPECIMEN CLOTTED SUGGEST RECOLLECTION     FLUID APPEARANCE SPECIMEN CLOTTED SUGGEST RECOLLECTION     FLUID RBC CT. SPECIMEN CLOTTED SUGGEST RECOLLECTION /cu mm    FLUID WBC COUNT SPECIMEN CLOTTED SUGGEST RECOLLECTION /cu mm    FLUID NUCLEATED CELLS SPECIMEN CLOTTED SUGGEST RECOLLECTION /cu mm    FLD NEUTROPHILS 83 %    FLD BANDS 0 %    FLD LYMPHS 7 %    FLD MONOCYTES 10 %    FLD EOSINS 0 %   CRYSTALS, SYNOVIAL FLUID    Collection Time: 09/26/18  8:10 PM   Result Value Ref Range    FLUID TYPE(7) ANKLE      Crystals, body fluid NO CRYSTALS SEEN     EKG, 12 LEAD, SUBSEQUENT    Collection Time: 09/26/18  9:09 PM   Result Value Ref Range    Ventricular Rate 113 BPM    Atrial Rate 113 BPM    QRS Duration 72 ms    Q-T Interval 344 ms    QTC Calculation (Bezet) 471 ms    Calculated R Axis 0 degrees    Calculated T Axis 53 degrees    Diagnosis       Atrial fibrillation with rapid ventricular response with premature   ventricular or aberrantly conducted complexes  Minimal voltage criteria for LVH, may be normal variant  Nonspecific T wave abnormality , probably digitalis effect  Abnormal ECG  When compared with ECG of 26-SEP-2018 16:26,  Atrial fibrillation has replaced Sinus rhythm  Nonspecific T wave abnormality now evident in Anterior leads     GLUCOSE, POC    Collection Time: 09/26/18 11:44 PM   Result Value Ref Range    Glucose (POC) 161 (H) 70 - 110 mg/dL   POTASSIUM    Collection Time: 09/27/18 12:00 AM   Result Value Ref Range    Potassium 3.5 3.5 - 5.5 mmol/L   MAGNESIUM    Collection Time: 09/27/18 12:00 AM   Result Value Ref Range    Magnesium 2.2 1.6 - 2.6 mg/dL   METABOLIC PANEL, BASIC    Collection Time: 09/27/18  3:56 AM   Result Value Ref Range    Sodium 141 136 - 145 mmol/L    Potassium 3.3 (L) 3.5 - 5.5 mmol/L    Chloride 106 100 - 108 mmol/L    CO2 23 21 - 32 mmol/L    Anion gap 12 3.0 - 18 mmol/L    Glucose 78 74 - 99 mg/dL    BUN 15 7.0 - 18 MG/DL    Creatinine 0.74 0.6 - 1.3 MG/DL    BUN/Creatinine ratio 20 12 - 20      GFR est AA >60 >60 ml/min/1.73m2    GFR est non-AA >60 >60 ml/min/1.73m2    Calcium 7.3 (L) 8.5 - 10.1 MG/DL   GLUCOSE, POC    Collection Time: 09/27/18  7:52 AM   Result Value Ref Range    Glucose (POC) 90 70 - 110 mg/dL       Lab/Data Reviewed: All lab results for the last 24 hours reviewed. XRays were reviewed in past 24 hours    Medications Reviewed            Assessment/Plan     Principal Problem:    Atrial fibrillation with RVR ([facility] [address] (9/26/2018)    Active Problems:    Diabetes mellitus type 2, controlled ([facility] [address] (4/27/2018)      Essential hypertension (4/27/2018)      Mixed hyperlipidemia (4/27/2018)      Acute gout of right ankle (9/26/2018)      Hypokalemia (9/26/2018)      Hypomagnesemia   Arthritis   Hx depression/Anxiety      Care plan     New Afib/RVR   - Patient on cardizem IV for rate control , will plan to wean off   - Metoprolol 100 mg bid   - ECHO pending   - TSH wnl  - Magnesium replaced   - [id number] = 4  ( + female , age , htn) .  She certainly will need embolic prophylaxis will discuss with cardiology   - Cardiology consulted this AM for recommendations     DM   - a1C 4.3  - Probably does not need medications     HTN   - Lopressor     Hx Gout   - doubt flare   - Uric acid elevated 7.5  - Arthrocentesis right ankle negative for crystals   - Continue allopurinol for prophylaxis      Right knee /ankle pain   - Likely arthritis   - XR right knee pending   - XR right ankle + DJD   - pain control as needed   - PT eval   - ortho consulted on adm will consider d/cing IV antibiotics but will defer to them     HLD   - Lipitor     Hypokalemia   - replace     Hypomagnesemia - replaced     Hx Depression/anxiety  - Cymbalta     DVT prophylaxis - heparin     Full code           Papo Arnold MD  September 27, 2018

## 2018-09-27 NOTE — PROGRESS NOTES
2256  Received from ED via stretcher, transferred pt to bed assist x 3. Family in waiting area. Alert and oriented x 4. . piv # 18 to right a/c with vanco infusing at 250 ml/hr. Piv # 2 to left wrist with cardizem gtt infusing at 5 ml/hr. piv # 28 to left arm with KCL infusing. Telemetry connected and verified afib with freq pvc on box # 62. Admission database completed. Oriented to room and unit, call bell explained. Bed low and locked, no complaints offered, pt instructed to call for assistance    0000  Reassessment completed. 0425  Reassessment completed, call bell within reach bed low and locked. Bedside and Verbal shift change report given to Lianna Almonte (oncoming nurse) by Dave De Jesus (offgoing nurse). Report included the following information SBAR, Kardex, Intake/Output, MAR, Recent Results and Cardiac Rhythm Afib.

## 2018-09-27 NOTE — PROGRESS NOTES
Problem: Falls - Risk of  Goal: *Absence of Falls  Document Sam Fall Risk and appropriate interventions in the flowsheet. Outcome: Progressing Towards Goal  Fall Risk Interventions:            Medication Interventions: Patient to call before getting OOB    Elimination Interventions:  Toileting schedule/hourly rounds

## 2018-09-27 NOTE — PROGRESS NOTES
Reason for Admission:   A-fib with RVR                  RRAT Score:     17             Do you (patient/family) have any concerns for transition/discharge? Not @ this time                Plan for utilizing home health:  Home health VS SNF       Likelihood of readmission? Mod/Yellow            Transition of Care Plan:     Home with home health VS SNF depending on progress. Chart reviewed. Met with pt., verified all demographics. States has MCR/ ins. States Dr. Syl Licona is her PCP, last seen approx one yr ago. NOK: Damian Salazar, dtr, whom lives with pt, also has MPOA, paperwork scanned in chart. Has walker & cane. Spoke with her about possible SNF for rehab, would prefer to go home with home health; made her that therapy has been ordered & we'll see how she does & what they rec, verbalized understanding. Will cont to follow for needs. Rebecca NassarRN,ext [phone]. Patient has designated her daughter to participate in his/her discharge plan and to receive any needed information. Name: Damian Salazar   Address:  [address] [place], [place] [id number]  Phone number:  [phone]    Care Management Interventions  PCP Verified by CM: Yes (last seen approx 1 yr ago)  Palliative Care Criteria Met (RRAT>21 & CHF Dx)?: No  Mode of Transport at Discharge: Other (see comment) (family)  Transition of Care Consult (CM Consult): Discharge Planning  Discharge Durable Medical Equipment: No  Physical Therapy Consult: Yes  Occupational Therapy Consult: Yes  Speech Therapy Consult: No  Current Support Network:  Other (dtr lives with pt)  Confirm Follow Up Transport: Family  Plan discussed with Pt/Family/Caregiver: Yes  Discharge Location  Discharge Placement: Home with home health

## 2018-09-27 NOTE — PROGRESS NOTES
Pharmacy Dosing Services: Vancomycin    Indication: Sepsis of Unknown Etiology    Day of therapy: 0    Other Antimicrobials (Include dose, start day & day of therapy):  Zosyn 3.375 grams every 8 hours DEEPTHI, 2018    Loading dose (date given): 2,000 mg  Current Maintenance dose: New start    Goal Vancomycin Level: 15-20 mcg/mL  (Trough 15-20 for most infections, 20 for meningitis/osteomyelitis, pre-HD level ~25)    Vancomycin Level (if drawn):   New start     Significant Cultures:   Blood and Urine cultures taken    Renal function stable? (unstable defined as SCr increase of 0.5 mg/dL or > 50% increase from baseline, whichever is greater) (Y/N): Y     CAPD, Hemodialysis or Renal Replacement Therapy (Y/N): N     Recent Labs      18   1630   CREA  0.99   BUN  15   WBC  7.8     Temp (24hrs), Av.9 °F (37.7 °C), Min:98.1 °F (36.7 °C), Max:101.6 °F (38.7 °C)    Creatinine Clearance (Creatinine Clearance (ml/min)): Estimated Creatinine Clearance: 46.2 mL/min (based on Cr of 0.99). Regimen assessment: New start  Maintenance dose: 1,250 mg every 24 hours  Next scheduled level: 2018 at 2030       Pharmacy will follow daily and adjust medications as appropriate for renal function and/or serum levels.     Thank you,  Laurent Mcfadden, PHARMD

## 2018-09-27 NOTE — CONSULTS
[facility] - Consult Note    Date of  Admission: 9/26/2018  4:19 PM   Primary Care Physician:  Dennis Almaraz MD (Inactive)    I saw, evaluated, interviewed and examined the patient personally. I agree with the findings and plan of care as documented below with PAKISHORE note  Possible A.fib on tele monitor. EKG from this admission suggest Multifocal atrial tachycardia  Will repeat EKG again today. Will review more data and would consider anticoagulation if definite diagnosis of A.fib  On BB already  ECHO today  Discussed about a.fib and stroke prophylaxis. ASA vs. Anticoagulation ( with coumadin / newer agent ). RIsk, benefit, side effects of medications and alternatives were discussed with patient regarding each medications. If truly a.fib, would start Xarelto. Patient had it before after orthopedic surgery and would prefer that medication. Would wait until further data available. Gold Gerber MD         Assessment:     - Possible Atrial fibrillation with RVR: CHADS2-Vasc 5 (HTN, female, age+2, diabetes)  - Presented with lower extremity pain, likely gout per ortho  - History of gout  - History of cardiac cath due to positive nuclear stress in April 2012, cath showed angiographically normal coronary arteries  - Hypertension  - Diabetes     Plan:     - PO Lopressor has been ordered at 100 mg BID, this is what the patient takes at home  - Risk of CVA in the setting of afib vs risk of bleed on anticoagulation was discussed in detail with patient. Patient understood and agrees to start anticoagulation, all questions answered, no hx of bleeding disorder, no falls, GI bleed hx. Will start Xarelto 20 mg daily. - Check echocardiogram this admission   - Patient uses walker at home, will benefit from PT     History of Present Illness: This is a 70 y.o. female admitted for Atrial fibrillation with RVR ([facility] [address]     Patient complains of:  LE pain     This is a 70year old female with a history of hypertension, diabetes, hyperlipidemia, positive stress test and negative cardiac cath in 2012, that cardiology is seeing in consult due to afib. She explains that over the last several days, she had pain in both feet to where she could not get up, and she was staying in bed all day. She denies having any chest pain, palpitations or awareness of afib. Denies hx afib, stroke, PE or DVT. No history of bleeding ulcers. Denies any falls in the last six months, prior to her LE pain, she was getting around her home in her walker. She denies CAD or CHF history. Cardiac risk factors: dyslipidemia, diabetes mellitus, hypertension      Review of Symptoms:  Except as stated above include:  Constitutional:  negative  Respiratory:  negative  Cardiovascular: per HPI  Gastrointestinal: negative  Genitourinary:  negative  Musculoskeletal: +LE pain  Neurological:  Negative  Dermatological:  Negative  Endocrinological: Negative  Psychological:  Negative    A comprehensive review of systems was negative except for that written in the HPI.      Past Medical History:     Past Medical History:   Diagnosis Date    [facility]     Hypercholesteremia     Hypertension          Social History:     Social History     Social History    Marital status:      Spouse name: N/A    Number of children: N/A    Years of education: N/A     Social History Main Topics    Smoking status: Never Smoker    Smokeless tobacco: Never Used    Alcohol use Yes    Drug use: No    Sexual activity: Not Asked     Other Topics Concern    None     Social History Narrative        Family History:     Family History   Problem Relation Age of Onset    Diabetes Other     Hypertension Other         Medications:   No Known Allergies     Current Facility-Administered Medications   Medication Dose Route Frequency    pantoprazole (PROTONIX) tablet 40 mg  40 mg Oral ACB    ibuprofen (MOTRIN) tablet 600 mg  600 mg Oral Q6H PRN    ondansetron (ZOFRAN ODT) tablet 4 mg  4 mg Oral Q6H PRN    sodium chloride (NS) flush 5-10 mL  5-10 mL IntraVENous PRN    atorvastatin (LIPITOR) tablet 40 mg  40 mg Oral DAILY    aspirin chewable tablet 81 mg  81 mg Oral DAILY    DULoxetine (CYMBALTA) capsule 60 mg  60 mg Oral DAILY    metoprolol tartrate (LOPRESSOR) tablet 100 mg  100 mg Oral BID    senna-docusate (PERICOLACE) 8.6-50 mg per tablet 1 Tab  1 Tab Oral BID PRN    melatonin tablet 3 mg  3 mg Oral QHS PRN    heparin (porcine) injection 5,000 Units  5,000 Units SubCUTAneous Q8H    insulin lispro (HUMALOG) injection   SubCUTAneous AC&HS    glucose chewable tablet 16 g  4 Tab Oral PRN    glucagon (GLUCAGEN) injection 1 mg  1 mg IntraMUSCular PRN    dextrose (D50) infusion 12.5-25 g  25-50 mL IntraVENous PRN    allopurinol (ZYLOPRIM) tablet 100 mg  100 mg Oral DAILY    influenza vaccine 2018-19 (6 mos+)(PF) (FLUARIX QUAD/FLULAVAL QUAD) injection 0.5 mL  0.5 mL IntraMUSCular PRIOR TO DISCHARGE    piperacillin-tazobactam (ZOSYN) 3.375 g in 0.9% sodium chloride (MBP/ADV) 100 mL MBP  3.375 g IntraVENous Q8H    vancomycin (VANCOCIN) 1,250 mg in 0.9% sodium chloride 250 mL IVPB  1,250 mg IntraVENous Q24H    VANCOMYCIN INFORMATION NOTE   Other Rx Dosing/Monitoring    [START ON 9/29/2018] VANCOMYCIN INFORMATION NOTE   Other ONCE         Physical Exam:     Visit Vitals    BP 98/49    Pulse 80    Temp 97.5 °F (36.4 °C)    Resp 16    Ht 5' 3\" (1.6 m)    Wt 164 lb 1.6 oz (74.4 kg)    SpO2 100%    Breastfeeding No    BMI 29.07 kg/m2     BP Readings from Last 3 Encounters:   09/27/18 98/49   04/29/18 137/74   01/21/17 131/64     Pulse Readings from Last 3 Encounters:   09/27/18 80   04/29/18 71   01/21/17 90     Wt Readings from Last 3 Encounters:   09/27/18 164 lb 1.6 oz (74.4 kg)   04/29/18 179 lb 0.2 oz (81.2 kg)   04/27/18 179 lb (81.2 kg)       General:  alert, cooperative, no distress, appears stated age  Neck:  nontender, no JVD  Lungs:  clear to auscultation bilaterally  Heart:  Irregularly irregular  Abdomen:  abdomen is soft without significant tenderness, masses, organomegaly or guarding  Extremities:  extremities normal, atraumatic, no cyanosis or edema  Skin: Warm and dry. no hyperpigmentation, vitiligo, or suspicious lesions  Neuro: alert, oriented x3, affect appropriate  Psych: non focal     Data Review:     Recent Labs      09/26/18   1630   WBC  7.8   HGB  9.9*   HCT  28.6*   PLT  253     Recent Labs      09/27/18   0356  09/27/18   0000  09/26/18   1630   NA  141   --   136   K  3.3*  3.5  2.6*   CL  106   --   97*   CO2  23   --   26   GLU  78   --   153*   BUN  15   --   15   CREA  0.74   --   0.99   CA  7.3*   --   7.7*   MG   --   2.2  1.1*   ALB   --    --   2.9*   SGOT   --    --   54*   ALT   --    --   39       Results for orders placed or performed during the hospital encounter of 09/26/18   EKG, 12 LEAD, INITIAL   Result Value Ref Range    Ventricular Rate 144 BPM    Atrial Rate 150 BPM    P-R Interval 152 ms    QRS Duration 70 ms    Q-T Interval 306 ms    QTC Calculation (Bezet) 473 ms    Calculated R Axis 31 degrees    Calculated T Axis 178 degrees    Diagnosis       Sinus tachycardia with occasional premature ventricular complexes  ST & T wave abnormality, consider inferior ischemia  Abnormal ECG  When compared with ECG of 27-APR-2018 15:30,  premature ventricular complexes are now present  Vent. rate has increased BY  59 BPM  ST now depressed in Anterior leads         All Cardiac Markers in the last 24 hours:  No results found for: CPK, CK, CKMMB, CKMB, RCK3, CKMBT, CKNDX, CKND1, KYRA, TROPT, TROIQ, CHEO, TROPT, TNIPOC, BNP, BNPP    Last Lipid:    Lab Results   Component Value Date/Time    Cholesterol, total 188 04/27/2018 03:05 PM    HDL Cholesterol 66 (H) 04/27/2018 03:05 PM    LDL, calculated 98.8 04/27/2018 03:05 PM    Triglyceride 116 04/27/2018 03:05 PM    CHOL/HDL Ratio 2.8 04/27/2018 03:05 PM       Signed By: Adriana Strickland Debbie Kasper     September 27, 2018

## 2018-09-27 NOTE — PROGRESS NOTES
1902  Received bedside verbal shift report from [address] [place] [address]. Pt lying in bed resting comfortably visitor at bedside. NSR with pac pvcon telemetry box # 62. Piv # 18 to right a/c # 20 to left a/c intact and piv # 20 to left arm intact. Call bell within reach, side rails up x 3, bed low and locked. No complaints offered, pt instructed to call for assistance. 2027  piv # 20 to left lower arm infiltrated with swelling noted, discontinued at this time. 2220  Pt with small watery bm noted. Cleaned at this time new bed pad placed. 2337  Reassessment completed. [address] started to right arm good blood return noted, pt tolerated well. Bedside and Verbal shift change report given to [address] [place] [address] (oncoming nurse) by Supriya Amaya (offgoing nurse).  Report included the following information SBAR, Kardex, Intake/Output, MAR, Recent Results and Cardiac Rhythm NSR with pac pvc

## 2018-09-27 NOTE — CONSULTS
Consult Note    Assessment:    1. Gout right ankle      PLAN:    1. Given the absence of a significant amount of fluid in the ankle and synovial analysis, there is a low clinical suspicion for septic arthritis. No erythema or elevated white count. She has a prior history of gout. Would recommend medical management for gout with f/u with rheumatology. HPI: Anai Winter is a 70 y.o. female patient presents with right ankle pain. She presented to the ED yesterday with after having symptoms for 2 days. She has a history of gout and has been unable to ambulate. The right ankle was aspirated in the ED of only 1.5cc of clear, yellow blood tinged fluid and sent for cell count but there was not enough fluid for a culture. She was started on antibiotics and was admitted for further management. Past Medical History:   Diagnosis Date    Diabetes ([address]     Hypercholesteremia     Hypertension      Past Surgical History:   Procedure Laterality Date    HX CARPAL TUNNEL RELEASE      HX ORTHOPAEDIC  april 2013    HX OTHER SURGICAL      left arm nerve surgery     Prior to Admission medications    Medication Sig Start Date End Date Taking? Authorizing Provider   allopurinol (ZYLOPRIM) 100 mg tablet Take 100 mg by mouth daily. Yes Jl Cotton MD   DULoxetine (CYMBALTA) 60 mg capsule Take 60 mg by mouth daily. Yes Jl Cotton MD   colchicine 0.6 mg tablet Take 1 tablet by mouth Q1Hour for gout pain. NOT TO EXCEED 3 TABLETS IN 24 HOURS. 1/21/17  Yes Felicitas Gonzales. Petrona Olson NP   atorvastatin (LIPITOR) 40 mg tablet Take 40 mg by mouth daily. Yes Jl Cotton MD   aspirin 81 mg tablet Take 81 mg by mouth. Yes Jl Cotton MD   metoprolol (LOPRESSOR) 100 mg tablet Take 100 mg by mouth two (2) times a day. Jl Cotton MD   esomeprazole (NEXIUM) 40 mg capsule Take 40 mg by mouth daily. Jl Cotton MD   febuxostat (ULORIC) 40 mg Tab tablet Take 40 mg by mouth daily.     Jl Cotton MD   No Known Allergies Social History     Social History    Marital status:      Spouse name: N/A    Number of children: N/A    Years of education: N/A     Occupational History    Not on file. Social History Main Topics    Smoking status: Never Smoker    Smokeless tobacco: Never Used    Alcohol use Yes    Drug use: No    Sexual activity: Not on file     Other Topics Concern    Not on file     Social History Narrative       Blood pressure 123/48, pulse 92, temperature 99.4 °F (37.4 °C), resp. rate 17, height 5' 3\" (1.6 m), weight 74.4 kg (164 lb 1.6 oz), SpO2 100 %, not currently breastfeeding. CBC w/Diff   Lab Results   Component Value Date/Time    WBC 7.8 09/26/2018 04:30 PM    RBC 2.95 (L) 09/26/2018 04:30 PM    HCT 28.6 (L) 09/26/2018 04:30 PM          Physical Assessment:  General: in no apparent distress   Extremities:  Skin intact bilat lower extremities. No effusion appreciated. No tenderness to palpation left ankle or significant pain with motion. Pain with passive ROM right ankle and moderate tenderness to palpation. Sensation intact to light touch   DVT Exam:   No exam evidence to suggest DVT. Compartments soft and NT. Radiology:   Right ankle xrays:   The visualized bony structures appear unremarkable for age without fracture or  dislocation. The ankle mortise is intact. Mild degenerative changes are present  along the intertarsal joints. Small anterior calcaneal spur noted. The soft tissues appear normal.    Results for Estela Riedel (MRN [id number]) as of 9/27/2018 09:51   Ref. Range 9/26/2018 20:10   BODY FLUID TYPE Unknown SPECIMEN CLOTTED . .. FLUID APPEARANCE Unknown SPECIMEN CLOTTED . .. FLUID COLOR Unknown SPECIMEN CLOTTED . .. FLUID RBC CT. Latest Units: /cu mm SPECIMEN CLOTTED . .. FLUID WBC COUNT Latest Units: /cu mm SPECIMEN CLOTTED . .. FLUID NUCLEATED CELLS Latest Units: /cu mm SPECIMEN CLOTTED . ..    FLD NEUTROPHILS Latest Units: % 83   FLD BANDS Latest Units: % 0   FLD LYMPHS Latest Units: % 7   FLD MONOCYTES Latest Units: % 10   FLD EOSINS Latest Units: % 0   FLUID TYPE(7) Latest Units:   ANKLE   Crystals, body fluid Latest Units:   NO CRYSTALS SEEN            - Nu Rome PA-C  9/27/2018  Office [phone]  Cell [phone]

## 2018-09-27 NOTE — PROGRESS NOTES
1013: PT orders received and chart reviewed. Patient currently on Cardizem drip, mobility contraindicated at this time. Will hold PT evaluation and f/u with patient when appropriate to participate.      Thank you for this referral,     Anuj Chun PT, DPT  Office extension: [phone]  Pager #: [phone]

## 2018-09-27 NOTE — PROGRESS NOTES
conducted an initial consultation and Spiritual Assessment for Citlalli Rae, who is a 70 y.o.,female. Patients Primary Language is: [place]. According to the patients EMR Sikhism Affiliation is: Jehovah witness. The reason the Patient came to the hospital is:   Patient Active Problem List    Diagnosis Date Noted    Acute gout of right ankle 09/26/2018    Atrial fibrillation with RVR ([facility] 09/26/2018    Hypokalemia 09/26/2018    Diabetes mellitus type 2, controlled ([facility] 04/27/2018    Essential hypertension 04/27/2018    Mixed hyperlipidemia 04/27/2018    Alcohol intoxication ([facility] 04/27/2018    TIA (transient ischemic attack) 04/27/2018    MINAL (acute kidney injury) ([facility] 04/27/2018        The  provided the following Interventions:  Initiated a relationship of care and support with patient in room 3001. Listened empathically as she talked about being here and her hopes for a brief stay. Family was present in room at time of visit. Provided information about Spiritual Care Services. Offered prayer and assurance of continued prayers on patients behalf. The following outcomes were achieved:  Patient shared limited information about her medical narrative. Patient processed feeling about current hospitalization. Patient expressed gratitude for pastoral care visit. Assessment:  Patient does not have any Alevism/cultural needs that will affect patients preferences in health care. There are no further spiritual or Alevism issues which require Spiritual Care Services interventions at this time. Plan:  Chaplains will continue to follow and will provide pastoral care on an as needed/requested basis    . [facility]   [phone]

## 2018-09-27 NOTE — ROUTINE PROCESS
26- Assumed care. Pt in bed resting. NAD.     0735- Shift assessment completed. Pt is on cardizem drip with HR in the 90's. No c/o pain. Alert and oriented x 4. No respiratory distress noted. Call light in reach. 9393- Due meds given. Tolerated well. 1137- Prn ibuprofen given for right knee pain. 1255- Cardizem gtt discontinued per Dr. Mikayla Trinh order. 1340- Pt taken to XR.     1440- Pt taken to Echo. 200- Dr. Shelly Stevenson made aware of XR of knee results. Bedside and Verbal shift change report given to [address] [place] [address] (oncoming nurse) by Osvaldo Lucio RN   (offgoing nurse). Report included the following information SBAR, Kardex, Procedure Summary, Intake/Output, MAR, Recent Results and Med Rec Status.

## 2018-09-28 LAB
ANION GAP SERPL CALC-SCNC: 10 MMOL/L (ref 3–18)
ATRIAL RATE: 65 BPM
BACTERIA SPEC CULT: NORMAL
BUN SERPL-MCNC: 17 MG/DL (ref 7–18)
BUN/CREAT SERPL: 17 (ref 12–20)
CALCIUM SERPL-MCNC: 8.5 MG/DL (ref 8.5–10.1)
CALCULATED P AXIS, ECG09: 48 DEGREES
CALCULATED R AXIS, ECG10: 9 DEGREES
CALCULATED T AXIS, ECG11: 27 DEGREES
CHLORIDE SERPL-SCNC: 108 MMOL/L (ref 100–108)
CO2 SERPL-SCNC: 22 MMOL/L (ref 21–32)
CREAT SERPL-MCNC: 0.98 MG/DL (ref 0.6–1.3)
DIAGNOSIS, 93000: NORMAL
ERYTHROCYTE [DISTWIDTH] IN BLOOD BY AUTOMATED COUNT: 17.7 % (ref 11.6–14.5)
GLUCOSE BLD STRIP.AUTO-MCNC: 85 MG/DL (ref 70–110)
GLUCOSE BLD STRIP.AUTO-MCNC: 89 MG/DL (ref 70–110)
GLUCOSE BLD STRIP.AUTO-MCNC: 91 MG/DL (ref 70–110)
GLUCOSE BLD STRIP.AUTO-MCNC: 93 MG/DL (ref 70–110)
GLUCOSE SERPL-MCNC: 74 MG/DL (ref 74–99)
HCT VFR BLD AUTO: 24.5 % (ref 35–45)
HGB BLD-MCNC: 8.2 G/DL (ref 12–16)
MCH RBC QN AUTO: 33.3 PG (ref 24–34)
MCHC RBC AUTO-ENTMCNC: 33.5 G/DL (ref 31–37)
MCV RBC AUTO: 99.6 FL (ref 74–97)
P-R INTERVAL, ECG05: 134 MS
PLATELET # BLD AUTO: 245 K/UL (ref 135–420)
PMV BLD AUTO: 12.2 FL (ref 9.2–11.8)
POTASSIUM SERPL-SCNC: 3.6 MMOL/L (ref 3.5–5.5)
Q-T INTERVAL, ECG07: 456 MS
QRS DURATION, ECG06: 72 MS
QTC CALCULATION (BEZET), ECG08: 474 MS
RBC # BLD AUTO: 2.46 M/UL (ref 4.2–5.3)
SERVICE CMNT-IMP: NORMAL
SODIUM SERPL-SCNC: 140 MMOL/L (ref 136–145)
VENTRICULAR RATE, ECG03: 65 BPM
WBC # BLD AUTO: 6.8 K/UL (ref 4.6–13.2)

## 2018-09-28 PROCEDURE — 74011000258 HC RX REV CODE- 258: Performed by: INTERNAL MEDICINE

## 2018-09-28 PROCEDURE — 82962 GLUCOSE BLOOD TEST: CPT

## 2018-09-28 PROCEDURE — 97162 PT EVAL MOD COMPLEX 30 MIN: CPT

## 2018-09-28 PROCEDURE — 65660000000 HC RM CCU STEPDOWN

## 2018-09-28 PROCEDURE — 85027 COMPLETE CBC AUTOMATED: CPT | Performed by: FAMILY MEDICINE

## 2018-09-28 PROCEDURE — 74011250636 HC RX REV CODE- 250/636: Performed by: INTERNAL MEDICINE

## 2018-09-28 PROCEDURE — 90471 IMMUNIZATION ADMIN: CPT

## 2018-09-28 PROCEDURE — 80048 BASIC METABOLIC PNL TOTAL CA: CPT | Performed by: FAMILY MEDICINE

## 2018-09-28 PROCEDURE — 90686 IIV4 VACC NO PRSV 0.5 ML IM: CPT | Performed by: INTERNAL MEDICINE

## 2018-09-28 PROCEDURE — 74011250637 HC RX REV CODE- 250/637: Performed by: INTERNAL MEDICINE

## 2018-09-28 PROCEDURE — 74011250637 HC RX REV CODE- 250/637: Performed by: FAMILY MEDICINE

## 2018-09-28 PROCEDURE — 93005 ELECTROCARDIOGRAM TRACING: CPT

## 2018-09-28 PROCEDURE — 36415 COLL VENOUS BLD VENIPUNCTURE: CPT | Performed by: FAMILY MEDICINE

## 2018-09-28 PROCEDURE — 97530 THERAPEUTIC ACTIVITIES: CPT

## 2018-09-28 RX ADMIN — IBUPROFEN 600 MG: 200 TABLET, FILM COATED ORAL at 12:42

## 2018-09-28 RX ADMIN — ALLOPURINOL 100 MG: 100 TABLET ORAL at 08:56

## 2018-09-28 RX ADMIN — ATORVASTATIN CALCIUM 40 MG: 40 TABLET, FILM COATED ORAL at 08:56

## 2018-09-28 RX ADMIN — METOPROLOL TARTRATE 100 MG: 50 TABLET ORAL at 17:04

## 2018-09-28 RX ADMIN — HEPARIN SODIUM 5000 UNITS: 5000 INJECTION INTRAVENOUS; SUBCUTANEOUS at 22:03

## 2018-09-28 RX ADMIN — HEPARIN SODIUM 5000 UNITS: 5000 INJECTION INTRAVENOUS; SUBCUTANEOUS at 15:59

## 2018-09-28 RX ADMIN — DULOXETINE HYDROCHLORIDE 60 MG: 60 CAPSULE, DELAYED RELEASE ORAL at 08:56

## 2018-09-28 RX ADMIN — PANTOPRAZOLE SODIUM 40 MG: 40 TABLET, DELAYED RELEASE ORAL at 08:56

## 2018-09-28 RX ADMIN — Medication 81 MG: at 08:56

## 2018-09-28 RX ADMIN — METOPROLOL TARTRATE 100 MG: 50 TABLET ORAL at 08:56

## 2018-09-28 RX ADMIN — PIPERACILLIN SODIUM,TAZOBACTAM SODIUM 3.38 G: 3; .375 INJECTION, POWDER, FOR SOLUTION INTRAVENOUS at 09:25

## 2018-09-28 RX ADMIN — HEPARIN SODIUM 5000 UNITS: 5000 INJECTION INTRAVENOUS; SUBCUTANEOUS at 08:56

## 2018-09-28 RX ADMIN — PIPERACILLIN SODIUM,TAZOBACTAM SODIUM 3.38 G: 3; .375 INJECTION, POWDER, FOR SOLUTION INTRAVENOUS at 01:35

## 2018-09-28 RX ADMIN — INFLUENZA VIRUS VACCINE 0.5 ML: 15; 15; 15; 15 SUSPENSION INTRAMUSCULAR at 22:03

## 2018-09-28 NOTE — PROGRESS NOTES
Problem: Falls - Risk of  Goal: *Absence of Falls  Document Sam Fall Risk and appropriate interventions in the flowsheet.    Outcome: Progressing Towards Goal  Fall Risk Interventions:  Mobility Interventions: Communicate number of staff needed for ambulation/transfer, Patient to call before getting OOB         Medication Interventions: Patient to call before getting OOB, Teach patient to arise slowly, Evaluate medications/consider consulting pharmacy    Elimination Interventions: Call light in reach, Patient to call for help with toileting needs

## 2018-09-28 NOTE — PROGRESS NOTES
Progress Note      Patient: Coty Morataya               Sex: female          DOA: 9/26/2018       YOB: 1940      Age:  70 y.o.        LOS:  LOS: 2 days               Subjective / Interval Hx  ELICEO Morataya is a 70 y.o. female  who presents with hx hypertension , dm , hyperlipidemia , gout who presented in ED with c/o bilateral leg pain and swelling worse on the right. She was noted to be in AFIB/RVR and started on Cardizem IV . Patient was started on IV vancomycin and Zosyn for presumed cellulitis vs gout flare , however arthrocentesis for right ankle was negative for crystals. She also c/o right knee pain will get XR Right knee today. She is afebrile. ECHO ordered for new afib is pending. Magnesium and Potassium are been replaced. TSH wnl . Cardiology will be consulted and would await echo to make a decision , also recommended repeat EKG for today which shows NSR. We will await their recommendations. Patient also had XR Right knee which shows severe osteoarthritis with possible effusion. Orthopedic was consulted and following. Patient denies and only has leg pain on movement not uncommon given severe arthritis. Blood cx started in on admission  will be dc'd. Blood cx NG2D. PT to evaluate patient       Objective:      Visit Vitals    /79    Pulse 69    Temp 98.5 °F (36.9 °C)    Resp 17    Ht 5' 3\" (1.6 m)    Wt 75.3 kg (166 lb)    SpO2 100%    Breastfeeding No    BMI 29.41 kg/m2             Physical Exam   Constitutional: She is oriented to person, place, and time. She appears well-developed and well-nourished. No distress. HENT:   Head: Normocephalic and atraumatic. Mouth/Throat: No oropharyngeal exudate. Eyes: Conjunctivae are normal. No scleral icterus. Neck: Neck supple. Cardiovascular: Normal rate, regular rhythm, S1 normal, S2 normal and normal heart sounds. No murmur heard.   Pulmonary/Chest: Effort normal and breath sounds normal. No respiratory distress. She has no wheezes. She has no rales. Abdominal: Soft. Bowel sounds are normal. She exhibits no distension. There is no tenderness. There is no rebound and no guarding. Musculoskeletal: She exhibits edema (ankle ). Neurological: She is alert and oriented to person, place, and time. Skin: Skin is warm. No rash noted. She is not diaphoretic. No erythema. No pallor. Psychiatric: She has a normal mood and affect. Intake and Output:  Current Shift:     Last three shifts:  09/26 1901 - 09/28 0700  In: 888.7 [P.O.:480; I.V.:408.7]  Out: -     Recent Results (from the past 48 hour(s))   EKG, 12 LEAD, INITIAL    Collection Time: 09/26/18  4:26 PM   Result Value Ref Range    Ventricular Rate 144 BPM    Atrial Rate 150 BPM    P-R Interval 152 ms    QRS Duration 70 ms    Q-T Interval 306 ms    QTC Calculation (Bezet) 473 ms    Calculated R Axis 31 degrees    Calculated T Axis 178 degrees    Diagnosis       Probable A.flutter with variable block vs. Multifocal atrial tachycardia  ST & T wave abnormality, consider inferior ischemia  Abnormal ECG  When compared with ECG of 27-APR-2018 15:30,  premature ventricular complexes are now present  Vent.  rate has increased BY  59 BPM  ST now depressed in Anterior leads  Confirmed by Delano Lester ([id number]) on 9/27/2018 3:55:20 PM     CULTURE, BLOOD    Collection Time: 09/26/18  4:30 PM   Result Value Ref Range    Special Requests: NO SPECIAL REQUESTS      Culture result: NO GROWTH 2 DAYS     URINALYSIS W/ RFLX MICROSCOPIC    Collection Time: 09/26/18  4:30 PM   Result Value Ref Range    Color DARK YELLOW      Appearance CLEAR      Specific gravity 1.021 1.005 - 1.030      pH (UA) 6.0 5.0 - 8.0      Protein 100 (A) NEG mg/dL    Glucose NEGATIVE  NEG mg/dL    Ketone 15 (A) NEG mg/dL    Bilirubin MODERATE (A) NEG      Blood NEGATIVE  NEG      Urobilinogen 4.0 (H) 0.2 - 1.0 EU/dL    Nitrites POSITIVE (A) NEG      Leukocyte Esterase TRACE (A) NEG     METABOLIC PANEL, COMPREHENSIVE    Collection Time: 09/26/18  4:30 PM   Result Value Ref Range    Sodium 136 136 - 145 mmol/L    Potassium 2.6 (LL) 3.5 - 5.5 mmol/L    Chloride 97 (L) 100 - 108 mmol/L    CO2 26 21 - 32 mmol/L    Anion gap 13 3.0 - 18 mmol/L    Glucose 153 (H) 74 - 99 mg/dL    BUN 15 7.0 - 18 MG/DL    Creatinine 0.99 0.6 - 1.3 MG/DL    BUN/Creatinine ratio 15 12 - 20      GFR est AA >60 >60 ml/min/1.73m2    GFR est non-AA 55 (L) >60 ml/min/1.73m2    Calcium 7.7 (L) 8.5 - 10.1 MG/DL    Bilirubin, total 1.1 (H) 0.2 - 1.0 MG/DL    ALT (SGPT) 39 13 - 56 U/L    AST (SGOT) 54 (H) 15 - 37 U/L    Alk. phosphatase 56 45 - 117 U/L    Protein, total 7.6 6.4 - 8.2 g/dL    Albumin 2.9 (L) 3.4 - 5.0 g/dL    Globulin 4.7 (H) 2.0 - 4.0 g/dL    A-G Ratio 0.6 (L) 0.8 - 1.7     CBC WITH AUTOMATED DIFF    Collection Time: 09/26/18  4:30 PM   Result Value Ref Range    WBC 7.8 4.6 - 13.2 K/uL    RBC 2.95 (L) 4.20 - 5.30 M/uL    HGB 9.9 (L) 12.0 - 16.0 g/dL    HCT 28.6 (L) 35.0 - 45.0 %    MCV 96.9 74.0 - 97.0 FL    MCH 33.6 24.0 - 34.0 PG    MCHC 34.6 31.0 - 37.0 g/dL    RDW 16.9 (H) 11.6 - 14.5 %    PLATELET 340 656 - 549 K/uL    MPV 11.0 9.2 - 11.8 FL    NEUTROPHILS 74 (H) 40 - 73 %    LYMPHOCYTES 10 (L) 21 - 52 %    MONOCYTES 16 (H) 3 - 10 %    EOSINOPHILS 0 0 - 5 %    BASOPHILS 0 0 - 2 %    ABS. NEUTROPHILS 5.8 1.8 - 8.0 K/UL    ABS. LYMPHOCYTES 0.8 (L) 0.9 - 3.6 K/UL    ABS. MONOCYTES 1.2 0.05 - 1.2 K/UL    ABS. EOSINOPHILS 0.0 0.0 - 0.4 K/UL    ABS.  BASOPHILS 0.0 0.0 - 0.1 K/UL    DF AUTOMATED     CULTURE, URINE    Collection Time: 09/26/18  4:30 PM   Result Value Ref Range    Special Requests: NO SPECIAL REQUESTS      Culture result: NO GROWTH AFTER 15 HOURS     SED RATE (ESR)    Collection Time: 09/26/18  4:30 PM   Result Value Ref Range    Sed rate, automated 74 (H) 0 - 30 mm/hr   C REACTIVE PROTEIN, QT    Collection Time: 09/26/18  4:30 PM   Result Value Ref Range    C-Reactive protein 24.0 (H) 0 - 0.3 mg/dL   URINE MICROSCOPIC ONLY    Collection Time: 09/26/18  4:30 PM   Result Value Ref Range    WBC 2 to 3 0 - 4 /hpf    RBC 0 0 - 5 /hpf    Epithelial cells FEW 0 - 5 /lpf    Bacteria 1+ (A) NEG /hpf   MAGNESIUM    Collection Time: 09/26/18  4:30 PM   Result Value Ref Range    Magnesium 1.1 (L) 1.6 - 2.6 mg/dL   TSH 3RD GENERATION    Collection Time: 09/26/18  4:30 PM   Result Value Ref Range    TSH 1.20 0.36 - 3.74 uIU/mL   URIC ACID    Collection Time: 09/26/18  4:30 PM   Result Value Ref Range    Uric acid 7.5 (H) 2.6 - 7.2 MG/DL   HEMOGLOBIN A1C WITH EAG    Collection Time: 09/26/18  4:30 PM   Result Value Ref Range    Hemoglobin A1c 4.3 4.2 - 5.6 %    Est. average glucose Cannot be calculated mg/dL   POC LACTIC ACID    Collection Time: 09/26/18  4:42 PM   Result Value Ref Range    Lactic Acid (POC) 2.3 (HH) 0.4 - 2.0 mmol/L   CULTURE, BLOOD    Collection Time: 09/26/18  5:53 PM   Result Value Ref Range    Special Requests: NO SPECIAL REQUESTS      Culture result: NO GROWTH 2 DAYS     POC LACTIC ACID    Collection Time: 09/26/18  7:43 PM   Result Value Ref Range    Lactic Acid (POC) 1.3 0.4 - 2.0 mmol/L   CELL COUNT AND DIFF, BODY FLUID    Collection Time: 09/26/18  8:10 PM   Result Value Ref Range    BODY FLUID TYPE SPECIMEN CLOTTED SUGGEST RECOLLECTION     FLUID COLOR SPECIMEN CLOTTED SUGGEST RECOLLECTION     FLUID APPEARANCE SPECIMEN CLOTTED SUGGEST RECOLLECTION     FLUID RBC CT. SPECIMEN CLOTTED SUGGEST RECOLLECTION /cu mm    FLUID WBC COUNT SPECIMEN CLOTTED SUGGEST RECOLLECTION /cu mm    FLUID NUCLEATED CELLS SPECIMEN CLOTTED SUGGEST RECOLLECTION /cu mm    FLD NEUTROPHILS 83 %    FLD BANDS 0 %    FLD LYMPHS 7 %    FLD MONOCYTES 10 %    FLD EOSINS 0 %   CRYSTALS, SYNOVIAL FLUID    Collection Time: 09/26/18  8:10 PM   Result Value Ref Range    FLUID TYPE(7) ANKLE      Crystals, body fluid NO CRYSTALS SEEN     EKG, 12 LEAD, SUBSEQUENT    Collection Time: 09/26/18  9:09 PM   Result Value Ref Range    Ventricular Rate 113 BPM    Atrial Rate 113 BPM    QRS Duration 72 ms    Q-T Interval 344 ms    QTC Calculation (Bezet) 471 ms    Calculated R Axis 0 degrees    Calculated T Axis 53 degrees    Diagnosis       likely Multifocal atrial tachycardia  Minimal voltage criteria for LVH, may be normal variant  Non-specific ST/T wave changes  Abnormal ECG  When compared with ECG of 26-SEP-2018 16:26,  Nonspecific T wave abnormality now evident in Anterior leads  Confirmed by Tiffany Maradiaga ([id number]) on 9/27/2018 3:59:53 PM     GLUCOSE, POC    Collection Time: 09/26/18 11:44 PM   Result Value Ref Range    Glucose (POC) 161 (H) 70 - 110 mg/dL   POTASSIUM    Collection Time: 09/27/18 12:00 AM   Result Value Ref Range    Potassium 3.5 3.5 - 5.5 mmol/L   MAGNESIUM    Collection Time: 09/27/18 12:00 AM   Result Value Ref Range    Magnesium 2.2 1.6 - 2.6 mg/dL   METABOLIC PANEL, BASIC    Collection Time: 09/27/18  3:56 AM   Result Value Ref Range    Sodium 141 136 - 145 mmol/L    Potassium 3.3 (L) 3.5 - 5.5 mmol/L    Chloride 106 100 - 108 mmol/L    CO2 23 21 - 32 mmol/L    Anion gap 12 3.0 - 18 mmol/L    Glucose 78 74 - 99 mg/dL    BUN 15 7.0 - 18 MG/DL    Creatinine 0.74 0.6 - 1.3 MG/DL    BUN/Creatinine ratio 20 12 - 20      GFR est AA >60 >60 ml/min/1.73m2    GFR est non-AA >60 >60 ml/min/1.73m2    Calcium 7.3 (L) 8.5 - 10.1 MG/DL   GLUCOSE, POC    Collection Time: 09/27/18  7:52 AM   Result Value Ref Range    Glucose (POC) 90 70 - 110 mg/dL   GLUCOSE, POC    Collection Time: 09/27/18 12:38 PM   Result Value Ref Range    Glucose (POC) 120 (H) 70 - 110 mg/dL   ECHO ADULT COMPLETE    Collection Time: 09/27/18  2:50 PM   Result Value Ref Range    Aortic Valve Systolic Peak Velocity 5.98 cm/s    AoV PG 0.0 mmHg    LVIDd 3.98 3.9 - 5.3 cm    LVPWd 1.13 (A) 0.6 - 0.9 cm    LVIDs 3.39 cm    IVSd 1.11 (A) 0.6 - 0.9 cm    LV ED Vol A2C 75.8 mL    LV ES Vol A4C 28.0 mL    LV ES Vol BP 23.5 19 - 49 mL    LVOT d 1.81 cm    LVOT Peak Velocity 85.38 cm/s    LVOT Peak Gradient 2.9 mmHg    MVA (PHT) 4.4 cm2    MV A Narciso 105.05 cm/s    MV E Narciso 0.74 cm/s    MV E/A 0.01     BP EF 67.4 55 - 100 %    LV Ejection Fraction MOD 4C 59 %    LV Ejection Fraction MOD 2C 75 %    LA Vol 4C 82.94 (A) 22 - 52 mL    LA Vol 2C 61.37 (A) 22 - 52 mL    LV Mass .7 (A) 67 - 162 g    LV Mass AL Index 96.6 g/m2    RVSP 19.5 mmHg    LV ES Vol A2C 18.8 mL    LVES Vol Index BP 13.2 mL/m2    LV ED Vol A4C 68.9 mL    LVED Vol Index BP 40.5 mL/m2    Est. RA Pressure 10.0 mmHg    Mitral Valve E Wave Deceleration Time 172.9 ms    Mitral Valve Pressure Half-time 50.1 ms    Triscuspid Valve Regurgitation Peak Gradient 9.5 mmHg    LV ED Vol BP 72.0 56 - 104 ml    TR Max Velocity -154.26 cm/s    PASP 19.5 mmHg    LA Vol Index 34.53 ml/m2    LA Vol Index 46.66 ml/m2    LVED Vol Index A4C 38.8 mL/m2    LVED Vol Index A2C 42.6 mL/m2    LVES Vol Index A4C 15.8 mL/m2    LVES Vol Index A2C 10.6 mL/m2   EKG, 12 LEAD, SUBSEQUENT    Collection Time: 09/27/18  3:45 PM   Result Value Ref Range    Ventricular Rate 74 BPM    Atrial Rate 74 BPM    P-R Interval 120 ms    QRS Duration 66 ms    Q-T Interval 466 ms    QTC Calculation (Bezet) 517 ms    Calculated R Axis 3 degrees    Calculated T Axis 21 degrees    Diagnosis       Sinus rhythm with marked sinus arrhythmia  Prolonged QT  Abnormal ECG    Confirmed by Paige Walsh ([id number]) on 9/27/2018 4:13:03 PM     GLUCOSE, POC    Collection Time: 09/27/18  5:15 PM   Result Value Ref Range    Glucose (POC) 103 70 - 110 mg/dL   GLUCOSE, POC    Collection Time: 09/27/18  9:00 PM   Result Value Ref Range    Glucose (POC) 165 (H) 70 - 627 mg/dL   METABOLIC PANEL, BASIC    Collection Time: 09/28/18  4:22 AM   Result Value Ref Range    Sodium 140 136 - 145 mmol/L    Potassium 3.6 3.5 - 5.5 mmol/L    Chloride 108 100 - 108 mmol/L    CO2 22 21 - 32 mmol/L    Anion gap 10 3.0 - 18 mmol/L    Glucose 74 74 - 99 mg/dL    BUN 17 7.0 - 18 MG/DL    Creatinine 0.98 0.6 - 1.3 MG/DL    BUN/Creatinine ratio 17 12 - 20      GFR est AA >60 >60 ml/min/1.73m2    GFR est non-AA 55 (L) >60 ml/min/1.73m2    Calcium 8.5 8.5 - 10.1 MG/DL   CBC W/O DIFF    Collection Time: 09/28/18  4:22 AM   Result Value Ref Range    WBC 6.8 4.6 - 13.2 K/uL    RBC 2.46 (L) 4.20 - 5.30 M/uL    HGB 8.2 (L) 12.0 - 16.0 g/dL    HCT 24.5 (L) 35.0 - 45.0 %    MCV 99.6 (H) 74.0 - 97.0 FL    MCH 33.3 24.0 - 34.0 PG    MCHC 33.5 31.0 - 37.0 g/dL    RDW 17.7 (H) 11.6 - 14.5 %    PLATELET 605 120 - 628 K/uL    MPV 12.2 (H) 9.2 - 11.8 FL   EKG, 12 LEAD, SUBSEQUENT    Collection Time: 09/28/18  6:22 AM   Result Value Ref Range    Ventricular Rate 65 BPM    Atrial Rate 65 BPM    P-R Interval 134 ms    QRS Duration 72 ms    Q-T Interval 456 ms    QTC Calculation (Bezet) 474 ms    Calculated P Axis 48 degrees    Calculated R Axis 9 degrees    Calculated T Axis 27 degrees    Diagnosis       Sinus rhythm with premature atrial complexes  Otherwise normal ECG  When compared with ECG of 27-SEP-2018 15:45,  premature atrial complexes are now present  Confirmed by Kellen Gay ([id number]) on 9/28/2018 8:41:58 AM     GLUCOSE, POC    Collection Time: 09/28/18  7:41 AM   Result Value Ref Range    Glucose (POC) 89 70 - 110 mg/dL       Lab/Data Reviewed: All lab results for the last 24 hours reviewed. XRays were reviewed in past 24 hours    Medications Reviewed            Assessment/Plan     Principal Problem:    Atrial fibrillation with RVR ([facility] (9/26/2018)    Active Problems:    Diabetes mellitus type 2, controlled ([facility] (4/27/2018)      Essential hypertension (4/27/2018)      Mixed hyperlipidemia (4/27/2018)      Acute gout of right ankle (9/26/2018)      Hypokalemia (9/26/2018)      Hypomagnesemia   Arthritis   Hx depression/Anxiety  Anemia       Care plan     New Afib/RVR   - seems like she had PVC per review   - repeat EKG and tele do not shows arrhythmias .    - Patient on cardizem IV for rate control , now off   - Metoprolol 100 mg bid   - ECHO EF 55% Normal left ventricular wall motion, no regional wall motion abnormality noted. Inconclusive left ventricular diastolic function.  - TSH wnl  - Magnesium replaced   - [id number] = 4  ( + female , age , htn) .  She certainly will need embolic prophylaxis will discuss with cardiology   - Cardiology consulted will follow recommendations     DM   - a1C 4.3  - Probably does not need medications     HTN   - Lopressor     Hx Gout   - doubt flare   - Uric acid elevated 7.5  - Arthrocentesis right ankle negative for crystals   - Continue allopurinol for prophylaxis      Right knee /ankle pain   - severe right knee osteoarthritis   - XR right knee noted severe DJD   - pain control as needed   - PT eval   - ortho consulted and following     HLD   - Lipitor     Anemia   - stable Hgb 8.2  - no bleed , probably due to hemodilution   - follow cbc     Hypokalemia   - replaced     Hypomagnesemia   - replaced     Hx Depression/anxiety  - Cymbalta     DVT prophylaxis - heparin     Full code     Disposition : 1 day      Sudhir Webb MD  September 28, 2018

## 2018-09-28 NOTE — ROUTINE PROCESS
0- Assumed care. Pt in bed awake. NAD.     0740- Pt is alert and oriented x 4. No c/o pain but states right knee is stiff. No respiratory distress noted. Call light in reach. 1242- Prn pain meds given for headache. Bedside and Verbal shift change report given to [address] (oncoming nurse) by Zuhair Zacarias RN   (offgoing nurse). Report included the following information SBAR, Kardex, Procedure Summary, Intake/Output, MAR, Recent Results and Med Rec Status.

## 2018-09-28 NOTE — PROGRESS NOTES
Problem: Falls - Risk of  Goal: *Absence of Falls  Document Sam Fall Risk and appropriate interventions in the flowsheet.    Outcome: Progressing Towards Goal  Fall Risk Interventions:  Mobility Interventions: Communicate number of staff needed for ambulation/transfer         Medication Interventions: Patient to call before getting OOB    Elimination Interventions: Call light in reach

## 2018-09-28 NOTE — PROGRESS NOTES
[facility]     Date of  Admission: 9/26/2018  4:19 PM   Primary Care Physician:  Oliva Rivers MD (Inactive)      Subjective:  No cardiac complaints  No chest pain or palpitatinos  Admits that she has been out of her medication and BB for few weeks. Assessment:     - likely Multifocal atrial tachycardia. Less likely A.fib. All EKGs during hospitalization and telemetry suggest most likely MAT  - Presented with lower extremity pain, likely gout per ortho  - History of gout  - History of cardiac cath due to positive nuclear stress in April 2012, cath showed angiographically normal coronary arteries  - Hypertension  - Diabetes     Plan:     Initial concern was a.fib but all EKGs and Tele monitor suggest MAT and sinus tachycardia and frequent PAC  Today family member/friend in room confirmed she has not been to PCP for while and not taking medication   She admits she may be out of medication including BB for few weeks, likely the reason for MAT and tachycardia. - compliance with meds and follow up with PCP advised  - Resume BB  - ASA for now  - No further cardiac work up planned at this time unless clinical status changes. Call us back if needed. Will be available as needed. Will need to follow up in cardiology clinic in 2-3 weeks after discharge. Thank you.       Past Medical History:     Past Medical History:   Diagnosis Date    Diabetes ([facility] [address]     Hypercholesteremia     Hypertension          Social History:     Social History     Social History    Marital status:      Spouse name: N/A    Number of children: N/A    Years of education: N/A     Social History Main Topics    Smoking status: Never Smoker    Smokeless tobacco: Never Used    Alcohol use Yes    Drug use: No    Sexual activity: Not Asked     Other Topics Concern    None     Social History Narrative        Family History:     Family History   Problem Relation Age of Onset    Diabetes Other     Hypertension Other Medications:   No Known Allergies     Current Facility-Administered Medications   Medication Dose Route Frequency    pantoprazole (PROTONIX) tablet 40 mg  40 mg Oral ACB    ibuprofen (MOTRIN) tablet 600 mg  600 mg Oral Q6H PRN    ondansetron (ZOFRAN ODT) tablet 4 mg  4 mg Oral Q6H PRN    sodium chloride (NS) flush 5-10 mL  5-10 mL IntraVENous PRN    atorvastatin (LIPITOR) tablet 40 mg  40 mg Oral DAILY    aspirin chewable tablet 81 mg  81 mg Oral DAILY    DULoxetine (CYMBALTA) capsule 60 mg  60 mg Oral DAILY    metoprolol tartrate (LOPRESSOR) tablet 100 mg  100 mg Oral BID    senna-docusate (PERICOLACE) 8.6-50 mg per tablet 1 Tab  1 Tab Oral BID PRN    melatonin tablet 3 mg  3 mg Oral QHS PRN    heparin (porcine) injection 5,000 Units  5,000 Units SubCUTAneous Q8H    insulin lispro (HUMALOG) injection   SubCUTAneous AC&HS    glucose chewable tablet 16 g  4 Tab Oral PRN    glucagon (GLUCAGEN) injection 1 mg  1 mg IntraMUSCular PRN    dextrose (D50) infusion 12.5-25 g  25-50 mL IntraVENous PRN    allopurinol (ZYLOPRIM) tablet 100 mg  100 mg Oral DAILY    influenza vaccine 2018-19 (6 mos+)(PF) (FLUARIX QUAD/FLULAVAL QUAD) injection 0.5 mL  0.5 mL IntraMUSCular PRIOR TO DISCHARGE         Physical Exam:     Visit Vitals    /79    Pulse 69    Temp 98.5 °F (36.9 °C)    Resp 17    Ht 5' 3\" (1.6 m)    Wt 166 lb (75.3 kg)    SpO2 100%    Breastfeeding No    BMI 29.41 kg/m2     BP Readings from Last 3 Encounters:   09/28/18 138/79   04/29/18 137/74   01/21/17 131/64     Pulse Readings from Last 3 Encounters:   09/28/18 69   04/29/18 71   01/21/17 90     Wt Readings from Last 3 Encounters:   09/28/18 166 lb (75.3 kg)   04/29/18 179 lb 0.2 oz (81.2 kg)   04/27/18 179 lb (81.2 kg)       General:  alert, cooperative, no distress, appears stated age  Neck:  nontender, no JVD  Lungs:  clear to auscultation bilaterally  Heart:  Regular  Abdomen:  abdomen is soft without significant tenderness, masses, organomegaly or guarding  Extremities:  extremities normal,       Data Review:     Recent Labs      09/28/18   0422  09/26/18   1630   WBC  6.8  7.8   HGB  8.2*  9.9*   HCT  24.5*  28.6*   PLT  245  253     Recent Labs      09/28/18   0422  09/27/18   0356  09/27/18   0000  09/26/18   1630   NA  140  141   --   136   K  3.6  3.3*  3.5  2.6*   CL  108  106   --   97*   CO2  22  23   --   26   GLU  74  78   --   153*   BUN  17  15   --   15   CREA  0.98  0.74   --   0.99   CA  8.5  7.3*   --   7.7*   MG   --    --   2.2  1.1*   ALB   --    --    --   2.9*   SGOT   --    --    --   54*   ALT   --    --    --   39       Results for orders placed or performed during the hospital encounter of 09/26/18   EKG, 12 LEAD, INITIAL   Result Value Ref Range    Ventricular Rate 144 BPM    Atrial Rate 150 BPM    P-R Interval 152 ms    QRS Duration 70 ms    Q-T Interval 306 ms    QTC Calculation (Bezet) 473 ms    Calculated R Axis 31 degrees    Calculated T Axis 178 degrees    Diagnosis       Probable A.flutter with variable block vs. Multifocal atrial tachycardia  ST & T wave abnormality, consider inferior ischemia  Abnormal ECG  When compared with ECG of 27-APR-2018 15:30,  premature ventricular complexes are now present  Vent.  rate has increased BY  59 BPM  ST now depressed in Anterior leads  Confirmed by Darwin Manzano ([id number]) on 9/27/2018 3:55:20 PM         All Cardiac Markers in the last 24 hours:  No results found for: CPK, CK, CKMMB, CKMB, RCK3, CKMBT, CKNDX, CKND1, KYRA, TROPT, TROIQ, CHEO, TROPT, TNIPOC, BNP, BNPP    Last Lipid:    Lab Results   Component Value Date/Time    Cholesterol, total 188 04/27/2018 03:05 PM    HDL Cholesterol 66 (H) 04/27/2018 03:05 PM    LDL, calculated 98.8 04/27/2018 03:05 PM    Triglyceride 116 04/27/2018 03:05 PM    CHOL/HDL Ratio 2.8 04/27/2018 03:05 PM       Signed By: Rafa Reyna MD     September 28, 2018

## 2018-09-28 NOTE — PROGRESS NOTES
Problem: Mobility Impaired (Adult and Pediatric)  Goal: *Acute Goals and Plan of Care (Insert Text)  Physical Therapy Goals   Initiated 9/28/2018 and to be accomplished within 7 days. 1.  Patient will complete all bed mobility with modified independence in order to prepare for EOB/OOB activity. 2.  Patient will perform sit <> stand with modified independence in order to prepare for OOB/gait activity. 3.  Patient will perform bed to chair transfers with modified independence in order to promote mobility and encourage seated activity to progress towards their prior level of function. 4.  Patient will ambulate 100 feet with modified independence using LRAD in order to prepare for safe negotiation of their environment. 5.  Patient will ascend/descend 2 steps with S handrail use with modified independence in order to prepare for safe exit/entry into their home environment. Outcome: Progressing Towards Goal  PHYSICAL THERAPY: Initial Assessment   INPATIENT: Medicare: Hospital Day: 3     Patient: Anai Winter (14 y.o. female)    Date: 9/28/2018  Primary Diagnosis: Atrial fibrillation with RVR ([address]    ,     Precautions:      PLOF:Independent     ASSESSMENT :  Patient supine in bed, agreeable to participation, agreeable to participation with PT. Supervision for supine <> sit, sit <> stand, and ambulation x 10 ft in room environment to access bathroom. Ambulates with RW with decreased gait speed and length Patient has c/o of increased R LE pain with mobility; this is improved with rest. Demo's fair standing balance with toileting tasks. Patient returned to bed and left supine with all needs within reach. Recommend d/c home with home health      Patient presents with deficits in:  Bed Mobility, Transfers, Gait and Stairs    Patient will benefit from skilled intervention to address the above impairments.   Patients rehabilitation potential is considered to be Good  Factors which may influence rehabilitation potential include:   []         None noted  []         Mental ability/status  [x]         Medical condition  []         Home/family situation and support systems  []         Safety awareness  []         Pain tolerance/management  []         Other:      PLAN :  Recommendations and Planned Interventions:  [x]           Bed Mobility Training             [x]    Neuromuscular Re-Education  [x]           Transfer Training                   []    Orthotic/Prosthetic Training  [x]           Gait Training                          []    Modalities  [x]           Therapeutic Exercises          []    Edema Management/Control  [x]           Therapeutic Activities            [x]    Patient and Family Training/Education  []           Other (comment):      EDUCATION:   Education:  Patient was educated on the following topics: Gait     Barriers to Learning/Limitations: None  Compensate with: visual, verbal, tactile, kinesthetic cues/model    Recommendations for the next treatment session: Gait  Frequency/Duration: Patient will be followed by physical therapy 1-2 times per day/4-7 days per week to address goals. Discharge Recommendations: Home Health  Further Equipment Recommendations for Discharge: rolling walker  Factors which may impact discharge planning: N/A     SUBJECTIVE:   Patient stated I've been using the bed pan.     OBJECTIVE DATA SUMMARY:     Past Medical History:   Diagnosis Date    [facility] ([facility] [address]     Hypercholesteremia     Hypertension     Multifocal atrial tachycardia ([facility] [address] 09/2018     Past Surgical History:   Procedure Laterality Date    HX CARPAL TUNNEL RELEASE      HX ORTHOPAEDIC  april 2013    HX OTHER SURGICAL      left arm nerve surgery         Eval Complexity: History: MEDIUM  Complexity : 1-2 comorbidities / personal factors will impact the outcome/ POC Exam:MEDIUM Complexity : 3 Standardized tests and measures addressing body structure, function, activity limitation and / or participation in recreation  Presentation: MEDIUM Complexity : Evolving with changing characteristics  Clinical Decision Making:Medium Complexity Patient requires AD for mobility and has increased LE pain  Overall Complexity:MEDIUM    G CODES:Mobility  Current  CJ= 20-39%   Goal  CI= 1-19%. The severity rating is based on the Other Decreased activity tolerance d/t increased pain, mobility with AD    Prior Level of Function/Home Situation:   Home Situation  Home Environment: Private residence  # Steps to Enter: 2  One/Two Story Residence: One story  Living Alone: No  Support Systems: Family member(s)  Patient Expects to be Discharged to[de-identified] Private residence  Current DME Used/Available at Home: Walker, rolling  Critical Behavior:  Neurologic State: Alert  Orientation Level: Oriented X4  Cognition: Follows commands  Safety/Judgement: Fall prevention  Psychosocial  Patient Behaviors: Calm; Cooperative  Purposeful Interaction: Yes    Tone : normal  Sensation: NT  Range Of Motion: WFL    Functional Mobility:      Functional Status      Indep   (I)   Mod I   Super-vision   Min A   Mod A   Max A   Total A   Assist x2 Verbal cues Additional time Not tested   Comments   Rolling []  []  [] []    []    []  []  [] [] [] [x]    Supine to sit []  []  [x] []  []  []  []  [] [] [] []    Sit to supine []  []  [x] []  []  []  []  [] [] [] []    Sit to stand []  []  [x] []  []  []  []  [] [] [] []    Stand to sit []  []  [x] []  []  []  []  [] [] [] []    Bed to chair transfers []  []  [] []  []  []  []  [] [] [] [x]        Balance    Good   Fair   Poor   Unable   Not tested   Comments   Sitting static [x]  []  []  []  []    Sitting dynamic [x]  []  []  []  []    Standing static []  [x]  []  []  []    Standing dynamic []  [x]  []  []  []        Mobility/Gait:   Level of Assistance: Supervision  Assistive Device: rolling walker  Distance Ambulated: 10 feet     Left Lower Extremity: FWB  Right Lower Extremity: FWB  Base of Support: center of gravity altered  Speed/Tila: pace decreased (<100 feet/min)  Step Length: left shortened and right shortened  Swing Pattern: WFL  Stance: WFL  Gait Abnormalities: altered arm swing    Pain: Pain in R LE with mobility     Vital Signs  Temp: 98.5 °F (36.9 °C)     Pulse (Heart Rate): 69     BP: 138/79     Resp Rate: 17     O2 Sat (%): 100 %    Activity Tolerance:   Fair, limited by pain     Please refer to the flowsheet for vital signs taken during this treatment. After treatment:   []         Patient left in no apparent distress sitting up in chair  [x]         Patient left in no apparent distress in bed  [x]         Call bell left within reach  [x]         Nursing notified  []         Caregiver present  []         Bed alarm activated    COMMUNICATION/EDUCATION:   [x]         Fall prevention education was provided and the patient/caregiver indicated understanding. [x]         Patient/family have participated as able in goal setting and plan of care. [x]         Patient/family agree to work toward stated goals and plan of care. []         Patient understands intent and goals of therapy, but is neutral about his/her participation. []         Patient is unable to participate in goal setting and plan of care.     Recommendations for nursing:  Written on communication board: up with assist x 1    Thank you for this referral.  Amanda Leach   Time Calculation: 24 mins

## 2018-09-28 NOTE — PROGRESS NOTES
Progress Note      Assessment:    1. Gout right ankle    PLAN:    1. Medical management for gout. 2. Much improved today and mobilizing with PT  3. Discharge Planning. No further ortho management needed at this time. F/u with rheumatology as an outpatient. Will be available as needed [phone]           HPI: Deb Lacy is a 70 y.o. female patient without new complaints for Atrial fibrillation with RVR ([address] 9/26/2018. Blood pressure 138/79, pulse 69, temperature 98.5 °F (36.9 °C), resp. rate 17, height 5' 3\" (1.6 m), weight 75.3 kg (166 lb), SpO2 100 %, not currently breastfeeding. CBC w/Diff   Lab Results   Component Value Date/Time    WBC 6.8 09/28/2018 04:22 AM    RBC 2.46 (L) 09/28/2018 04:22 AM    HCT 24.5 (L) 09/28/2018 04:22 AM          Physical Assessment:  General: in no apparent distress   Extremities:  Skin intact bilat lower extremities. No effusion appreciated. No tenderness to palpation left ankle or significant pain with motion. Decreased pain with passive ROM right ankle and moderate tenderness to palpation. Sensation intact to light touch   DVT Exam:   No exam evidence to suggest DVT.  Compartments soft and NT.           - Nu Rome PA-C  9/28/2018  Office [phone]  Cell [phone]

## 2018-09-28 NOTE — PROGRESS NOTES
1900 -- Bedside, Verbal and Written shift change report given to [id number] Malka Almonte (oncoming nurse) by Ash Repress nurse). Report included the following information SBAR, Kardex, Intake/Output, MAR and Recent Results.       2200 -- PM medications administered, pt tolerated with ease, will continue to monitor. Influenza vaccine administered, Immunization history updated.     2300 -- Spoke with Karlos Koo, [phone], discuss plan of care home with home health, possible discharge 09/29/18, all questions and concerns answered. POA concerned about pt receiving Cymbalta, wishes for medication to be held, will advise day shift RN.    0030 -- Shift reassessment, pt condition unchanged, will continue to monitor.      0415 --  Shift reassessment, pt condition unchanged, will continue to monitor.         0700 -- Bedside, Verbal and Written shift change report given to CLARKE (oncoming nurse) by CLARA (offgoing nurse). Report included the following information SBAR, Kardex, Intake/Output, MAR and Recent Results. Skin assessment completed.

## 2018-09-28 NOTE — INTERDISCIPLINARY ROUNDS
IDR Summary      Patient: Lakhwinder Corona MRN: [id number]    Age: 70 y.o.  : 1940     Admit Diagnosis: Atrial fibrillation with RVR ([address]        DIET status: Cardiac     Lines/Tubes:   IV: YES   Needed: YES  Valdivia: NO  Needed:NO  Central Line: NO Needed: NO      VTE Prophylaxis: Chemical    Mobility needs: Yes     PT ordered:  YES PT eval on chart: YES    OT ordered:  YES OT eval on chart: YES      ST ordered:  NO ST eval on chart:  NO     Disposition/Care Management:  Discharge plan:  Home with home health       Barriers to discharge:   Financial concerns:No   PCP: Yadiel Anderson MD (Inactive)    : NO  Interventions:   D/c cardizem gtt-rate controlled     LOS: 2 days     Expected days until discharge:            Signed:     GOPAL Broussard   [address]  Hospitalist Division  Pager:  [phone]   Office:  [phone]

## 2018-09-28 NOTE — PROGRESS NOTES
Offered the pt FOC for home care, she chose [facility]. Pt verified the address on the face sheet is correct. Referral sent to [facility] via [address] and called to intake repDarrick as a pending dc. Included in the CC note an to intake the contact to schedule the visits is the pt's Priyaurban Coreas # [phone]    Care Management Interventions  PCP Verified by CM: Yes (last seen approx 1 yr ago)  Palliative Care Criteria Met (RRAT>21 & CHF Dx)?: No  Mode of Transport at Discharge: Other (see comment) (family)  Transition of Care Consult (CM Consult): [address]: Yes  Discharge Durable Medical Equipment: No  Physical Therapy Consult: Yes  Occupational Therapy Consult: Yes  Speech Therapy Consult: No  Current Support Network:  Other (dtr lives with pt)  Confirm Follow Up Transport: Family  Plan discussed with Pt/Family/Caregiver: Yes  Freedom of Choice Offered: Yes  Discharge Location  Discharge Placement: Home with home health

## 2018-09-28 NOTE — PROGRESS NOTES
Chart reviewed. Transition plan remains home with home health. Will cont to follow for further needs. Rebecca Nassar RN,ext [phone].

## 2018-09-29 ENCOUNTER — HOME HEALTH ADMISSION (OUTPATIENT)
Dept: HOME HEALTH SERVICES | Facility: HOME HEALTH | Age: 78
End: 2018-09-29
Payer: MEDICARE

## 2018-09-29 VITALS
RESPIRATION RATE: 18 BRPM | HEIGHT: 63 IN | HEART RATE: 76 BPM | DIASTOLIC BLOOD PRESSURE: 84 MMHG | OXYGEN SATURATION: 95 % | TEMPERATURE: 98 F | WEIGHT: 167 LBS | BODY MASS INDEX: 29.59 KG/M2 | SYSTOLIC BLOOD PRESSURE: 152 MMHG

## 2018-09-29 LAB
ANION GAP SERPL CALC-SCNC: 12 MMOL/L (ref 3–18)
BASOPHILS # BLD: 0 K/UL (ref 0–0.1)
BASOPHILS NFR BLD: 1 % (ref 0–2)
BUN SERPL-MCNC: 15 MG/DL (ref 7–18)
BUN/CREAT SERPL: 16 (ref 12–20)
CALCIUM SERPL-MCNC: 8.7 MG/DL (ref 8.5–10.1)
CHLORIDE SERPL-SCNC: 105 MMOL/L (ref 100–108)
CO2 SERPL-SCNC: 22 MMOL/L (ref 21–32)
CREAT SERPL-MCNC: 0.92 MG/DL (ref 0.6–1.3)
DIFFERENTIAL METHOD BLD: ABNORMAL
EOSINOPHIL # BLD: 0.1 K/UL (ref 0–0.4)
EOSINOPHIL NFR BLD: 3 % (ref 0–5)
ERYTHROCYTE [DISTWIDTH] IN BLOOD BY AUTOMATED COUNT: 17.2 % (ref 11.6–14.5)
GLUCOSE BLD STRIP.AUTO-MCNC: 81 MG/DL (ref 70–110)
GLUCOSE SERPL-MCNC: 77 MG/DL (ref 74–99)
HCT VFR BLD AUTO: 25.7 % (ref 35–45)
HGB BLD-MCNC: 8.6 G/DL (ref 12–16)
LYMPHOCYTES # BLD: 1.3 K/UL (ref 0.9–3.6)
LYMPHOCYTES NFR BLD: 32 % (ref 21–52)
MCH RBC QN AUTO: 33.3 PG (ref 24–34)
MCHC RBC AUTO-ENTMCNC: 33.5 G/DL (ref 31–37)
MCV RBC AUTO: 99.6 FL (ref 74–97)
MONOCYTES # BLD: 0.4 K/UL (ref 0.05–1.2)
MONOCYTES NFR BLD: 9 % (ref 3–10)
NEUTS SEG # BLD: 2.3 K/UL (ref 1.8–8)
NEUTS SEG NFR BLD: 55 % (ref 40–73)
PLATELET # BLD AUTO: 294 K/UL (ref 135–420)
PMV BLD AUTO: 12.6 FL (ref 9.2–11.8)
POTASSIUM SERPL-SCNC: 4 MMOL/L (ref 3.5–5.5)
RBC # BLD AUTO: 2.58 M/UL (ref 4.2–5.3)
SODIUM SERPL-SCNC: 139 MMOL/L (ref 136–145)
WBC # BLD AUTO: 4.1 K/UL (ref 4.6–13.2)

## 2018-09-29 PROCEDURE — 80048 BASIC METABOLIC PNL TOTAL CA: CPT | Performed by: FAMILY MEDICINE

## 2018-09-29 PROCEDURE — 36415 COLL VENOUS BLD VENIPUNCTURE: CPT | Performed by: FAMILY MEDICINE

## 2018-09-29 PROCEDURE — 85025 COMPLETE CBC W/AUTO DIFF WBC: CPT | Performed by: FAMILY MEDICINE

## 2018-09-29 PROCEDURE — 74011250637 HC RX REV CODE- 250/637: Performed by: INTERNAL MEDICINE

## 2018-09-29 PROCEDURE — 82962 GLUCOSE BLOOD TEST: CPT

## 2018-09-29 PROCEDURE — 74011250636 HC RX REV CODE- 250/636: Performed by: INTERNAL MEDICINE

## 2018-09-29 RX ORDER — IBUPROFEN 600 MG/1
600 TABLET ORAL
Qty: 30 TAB | Refills: 0 | Status: SHIPPED | OUTPATIENT
Start: 2018-09-29

## 2018-09-29 RX ORDER — ALLOPURINOL 100 MG/1
100 TABLET ORAL DAILY
Qty: 30 TAB | Refills: 0 | Status: SHIPPED | OUTPATIENT
Start: 2018-09-29

## 2018-09-29 RX ORDER — ATORVASTATIN CALCIUM 40 MG/1
40 TABLET, FILM COATED ORAL DAILY
Qty: 30 TAB | Refills: 0 | Status: SHIPPED | OUTPATIENT
Start: 2018-09-29

## 2018-09-29 RX ORDER — METOPROLOL TARTRATE 100 MG/1
100 TABLET ORAL 2 TIMES DAILY
Qty: 60 TAB | Refills: 0 | Status: SHIPPED | OUTPATIENT
Start: 2018-09-29

## 2018-09-29 RX ORDER — PANTOPRAZOLE SODIUM 40 MG/1
40 TABLET, DELAYED RELEASE ORAL DAILY
Qty: 30 TAB | Refills: 0 | Status: SHIPPED | OUTPATIENT
Start: 2018-09-29

## 2018-09-29 RX ADMIN — HEPARIN SODIUM 5000 UNITS: 5000 INJECTION INTRAVENOUS; SUBCUTANEOUS at 09:08

## 2018-09-29 RX ADMIN — Medication 81 MG: at 09:08

## 2018-09-29 RX ADMIN — METOPROLOL TARTRATE 100 MG: 50 TABLET ORAL at 09:08

## 2018-09-29 RX ADMIN — ATORVASTATIN CALCIUM 40 MG: 40 TABLET, FILM COATED ORAL at 09:08

## 2018-09-29 RX ADMIN — PANTOPRAZOLE SODIUM 40 MG: 40 TABLET, DELAYED RELEASE ORAL at 09:07

## 2018-09-29 RX ADMIN — ALLOPURINOL 100 MG: 100 TABLET ORAL at 09:08

## 2018-09-29 NOTE — PROGRESS NOTES
OT eval orders received. Chart reviewed. Attempt for OT eval at 1300. Pt getting ready to be discharged at home. Family at bedside reports no concerns for ADLs and mobility. Scheduled to get [facility] therapy. Current orders will be discontinued.      Ira Gerard, OTR/L

## 2018-09-29 NOTE — ROUTINE PROCESS
Bedside, Verbal and Written shift change report given to Radha Medeiros RN (oncoming nurse) by Jamison Garcia RN (offgoing nurse). Report included the following information SBAR, Kardex, Intake/Output, MAR, Recent Results, Med Rec Status, Procedure Summary and Cardiac Rhythm NSR w/PAC's.      0730 - Shift assessment completed. Pt alert and oriented x4. No respiratory distress noted. No c/o pain reported. Call bell within reach, bed in low position. Will continue to monitor.      1230 - Shift re-assessment completed, no change in pt condition. 1300 - I have reviewed discharge instructions with the patient. The patient verbalized understanding. Discharge medications reviewed with patient and appropriate educational materials and side effects teaching were provided. IV catheters x2 discontinued intact. Sites without signs and symptoms of complications. Dressings and pressure applied. Patient armband removed and shredded. [phone] - Pt discharged to home with family. All pt belongings went with her.

## 2018-09-29 NOTE — DISCHARGE SUMMARY
DISCHARGE SUMMARY        PATIENT ID: Danielito Coffman  MRN: [id number]   YOB: 1940   AGE: 70 y.o. DATE OF ADMISSION: 9/26/2018  4:19 PM    DATE OF DISCHARGE: 9/30/2018  PRIMARY CARE PROVIDER: Kai Rasmussen MD (Inactive)     Procedure Performed:  none      DISCHARGING PHYSICIAN: Farnaz Campos MD    Call hospitalist office [phone]. Discharge Diagnosis:   Patient Active Problem List    Diagnosis Date Noted    Acute gout of right ankle 09/26/2018    Atrial fibrillation with RVR ([facility] 09/26/2018    Hypokalemia 09/26/2018    Diabetes mellitus type 2, controlled ([facility] 04/27/2018    Essential hypertension 04/27/2018    Mixed hyperlipidemia 04/27/2018    Alcohol intoxication ([facility] 04/27/2018    TIA (transient ischemic attack) 04/27/2018    MINAL (acute kidney injury) ([facility] 04/27/2018              CONSULTATIONS: IP CONSULT TO CARDIOLOGY    History of Present Ilness:  Danielito Coffman is a 70y.o. year old female who presents with  Bilateral LE pain and edema with inability to care for self at home. I have been having swelling in my feet for  A couple days. I thought this was gout; I have had this before. I did not take any gout medicine. I have pain in bilateral feet, rated at 6/10. Pain is worse with pressure / touching / walking. Pain in right foot and left great toe      Hospital Course:     Danielito Coffman is a 70 y.o. female  who presents with hx hypertension , dm , hyperlipidemia , gout who presented in ED with c/o bilateral leg pain and swelling worse on the right. She was noted to be in AFIB/RVR and started on Cardizem IV . Patient was started on IV vancomycin and Zosyn for presumed cellulitis vs gout flare , however arthrocentesis for right ankle was negative for crystals. She also c/o right knee pain will get XR Right knee today. She is afebrile. ECHO ordered for new afib is pending. Magnesium and Potassium are been replaced. TSH wnl .  Cardiology will be consulted and would await echo to make a decision , also recommended repeat EKG for today which shows NSR. We will await their recommendations. Patient also had XR Right knee which shows severe osteoarthritis with possible effusion. Orthopedic was consulted and following. Patient denies and only has leg pain on movement not uncommon given severe arthritis. Blood cx started in on admission  will be dc'd. Blood cx NG2D. PT evaluated patient and recommended home health. . Cardiology do not recommend any further work up. Orthopedic recommend no further orthopedic management in patient. Patient to follow up with her PCP for management of osteoarthritis and gout. Patient at time of discharge was stable. Physical Exam on Discharge:  Visit Vitals    /84  Comment: BP med given    Pulse 76    Temp 98 °F (36.7 °C)    Resp 18    Ht 5' 3\" (1.6 m)    Wt 75.8 kg (167 lb)    SpO2 95%    Breastfeeding No    BMI 29.58 kg/m2       General:  Alert, cooperative, no distress, appears stated age. Lungs:   Clear to auscultation bilaterally. Chest wall:  No tenderness or deformity. Heart:  Regular rate and rhythm, S1, S2 normal, no murmur, click, rub or gallop. Abdomen:   Soft, non-tender. Bowel sounds normal. No masses,  No organomegaly. Extremities: Extremities normal, atraumatic, no cyanosis or edema. Pulses: 2+ and symmetric all extremities. Skin: Skin color, texture, turgor normal. No rashes or lesions   Neurologic: CNII-XII intact.         Pertinent Results:    Recent Labs      09/29/18   0400   BUN  15   NA  139   CO2  22     Recent Labs      09/29/18   0400   WBC  4.1*   RBC  2.58*   HCT  25.7*   MCV  99.6*   MCH  33.3   MCHC  33.5   RDW  17.2*     All Micro Results     Procedure Component Value Units Date/Time    CULTURE, BLOOD [[id number]] Collected:  09/26/18 7418    Order Status:  Completed Specimen:  Blood from Blood Updated:  09/30/18 1032     Special Requests: NO SPECIAL REQUESTS        Culture result: NO GROWTH 4 DAYS       CULTURE, BLOOD [[id number]] Collected:  09/26/18 1630    Order Status:  Completed Specimen:  Blood from Blood Updated:  09/30/18 1034     Special Requests: NO SPECIAL REQUESTS        Culture result: NO GROWTH 4 DAYS       CULTURE, URINE [[id number]] Collected:  09/26/18 1630    Order Status:  Completed Specimen:  Urine from Cath Urine Updated:  09/28/18 1059     Special Requests: NO SPECIAL REQUESTS        Culture result: NO GROWTH 2 DAYS             CT Results  (Last 48 hours)    None        EXAM: Right knee series     INDICATION:   Right knee pain and swelling     COMPARISON: None.     FINDINGS: 4 separate views of the right knee demonstrate tricompartmental DJD  severe medially. There is no fracture or dislocation. There may be a joint  effusion.      IMPRESSION  IMPRESSION:     Tricompartmental DJD severe medially. Probable joint effusion:           History: Right ankle pain     3 separate views of the right ankle.       The visualized bony structures appear unremarkable for age without fracture or  dislocation. The ankle mortise is intact. Mild degenerative changes are present  along the intertarsal joints. Small anterior calcaneal spur noted. The soft tissues appear normal.      IMPRESSION  IMPRESSION:     Mild DJD. Otherwise unremarkable. DISCHARGE MEDICATIONS:   Discharge Medication List as of 9/29/2018 12:48 PM      START taking these medications    Details   ibuprofen (MOTRIN) 600 mg tablet Take 1 Tab by mouth every six (6) hours as needed. Indications: Pain, Print, Disp-30 Tab, R-0      pantoprazole (PROTONIX) 40 mg tablet Take 1 Tab by mouth daily. , Print, Disp-30 Tab, R-0         CONTINUE these medications which have CHANGED    Details   allopurinol (ZYLOPRIM) 100 mg tablet Take 1 Tab by mouth daily. , Print, Disp-30 Tab, R-0      atorvastatin (LIPITOR) 40 mg tablet Take 1 Tab by mouth daily. , Print, Disp-30 Tab, R-0      metoprolol tartrate (LOPRESSOR) 100 mg IR tablet Take 1 Tab by mouth two (2) times a day., Print, Disp-60 Tab, R-0         CONTINUE these medications which have NOT CHANGED    Details   DULoxetine (CYMBALTA) 60 mg capsule Take 60 mg by mouth daily. , Historical Med      aspirin 81 mg tablet Take 81 mg by mouth. Historical Med, 81 mg         STOP taking these medications       colchicine 0.6 mg tablet Comments:   Reason for Stopping:         esomeprazole (NEXIUM) 40 mg capsule Comments:   Reason for Stopping:         febuxostat (ULORIC) 40 mg Tab tablet Comments:   Reason for Stopping:               Condition at discharge:  Afrebrile  Ambulating  Eating, Drinking, Voiding  Stable    FOLLOW UP APPOINTMENTS:    Follow-up Information     Follow up With Details Comments [id number] Ana María Christine MD On 10/2/2018 1:15pm [address]  [phone]             Disposition:   Home with [address] [place] [address]       Total discharge preparation time was > 30   minutes.

## 2018-09-29 NOTE — DISCHARGE INSTRUCTIONS
FOLLOW UP VISIT Appointment in: Other (Specify) Follow up with PCP X 1 week      DISCHARGE SUMMARY from Nurse    PATIENT INSTRUCTIONS:    After general anesthesia or intravenous sedation, for 24 hours or while taking prescription Narcotics:  · Limit your activities  · Do not drive and operate hazardous machinery  · Do not make important personal or business decisions  · Do  not drink alcoholic beverages  · If you have not urinated within 8 hours after discharge, please contact your surgeon on call. Report the following to your surgeon:  · Excessive pain, swelling, redness or odor of or around the surgical area  · Temperature over 100.5  · Nausea and vomiting lasting longer than 4 hours or if unable to take medications  · Any signs of decreased circulation or nerve impairment to extremity: change in color, persistent  numbness, tingling, coldness or increase pain  · Any questions    What to do at Home:  Recommended activity: Activity as tolerated and PT/OT per Home Health,     If you experience any of the following symptoms pain, high blood pressure, irregular heart beat, or any other concerns, please follow up with primary care physician. *  Please give a list of your current medications to your Primary Care Provider. *  Please update this list whenever your medications are discontinued, doses are      changed, or new medications (including over-the-counter products) are added. *  Please carry medication information at all times in case of emergency situations. These are general instructions for a healthy lifestyle:    No smoking/ No tobacco products/ Avoid exposure to second hand smoke  Surgeon General's Warning:  Quitting smoking now greatly reduces serious risk to your health.     Obesity, smoking, and sedentary lifestyle greatly increases your risk for illness    A healthy diet, regular physical exercise & weight monitoring are important for maintaining a healthy lifestyle    You may be retaining fluid if you have a history of heart failure or if you experience any of the following symptoms:  Weight gain of 3 pounds or more overnight or 5 pounds in a week, increased swelling in our hands or feet or shortness of breath while lying flat in bed. Please call your doctor as soon as you notice any of these symptoms; do not wait until your next office visit. Recognize signs and symptoms of STROKE:    F-face looks uneven    A-arms unable to move or move unevenly    S-speech slurred or non-existent    T-time-call 911 as soon as signs and symptoms begin-DO NOT go       Back to bed or wait to see if you get better-TIME IS BRAIN. Warning Signs of HEART ATTACK     Call 911 if you have these symptoms:   Chest discomfort. Most heart attacks involve discomfort in the center of the chest that lasts more than a few minutes, or that goes away and comes back. It can feel like uncomfortable pressure, squeezing, fullness, or pain.  Discomfort in other areas of the upper body. Symptoms can include pain or discomfort in one or both arms, the back, neck, jaw, or stomach.  Shortness of breath with or without chest discomfort.  Other signs may include breaking out in a cold sweat, nausea, or lightheadedness. Don't wait more than five minutes to call 911 - MINUTES MATTER! Fast action can save your life. Calling 911 is almost always the fastest way to get lifesaving treatment. Emergency Medical Services staff can begin treatment when they arrive -- up to an hour sooner than if someone gets to the hospital by car. The discharge information has been reviewed with the patient and daughter. The patient and daughter verbalized understanding. Discharge medications reviewed with the patient and daughter and appropriate educational materials and side effects teaching were provided.   ___________________________________________________________________________________________________________________________________    Patient armband removed and shredded. MyChart Activation    Thank you for enrolling in [address] Please follow the instructions below to securely access your online medical record. [employer] allows you to send messages to your doctor, view your test results, renew your prescriptions, schedule appointments, and more. How Do I Sign Up? 1. In your internet browser, go to [email or web address]. 2. Click on the First Time User? Click Here link in the Sign In box. You will see the New Member Sign Up page. 3. Enter your [employer] Access Code exactly as it appears below. You will not need to use this code after youve completed the sign-up process. If you do not sign up before the expiration date, you must request a new code. [employer] Access Code: [email or web address]  Expires: 12/25/2018  4:24 PM     4. Enter the last four digits of your Social Security Number (xxxx) and Date of Birth (mm/dd/yyyy) as indicated and click Submit. You will be taken to the next sign-up page. 5. Create a [employer] ID. This will be your [employer] login ID and cannot be changed, so think of one that is secure and easy to remember. 6. Create a [employer] password. You can change your password at any time. 7. Enter your Password Reset Question and Answer. This can be used at a later time if you forget your password. 8. Enter your e-mail address. You will receive e-mail notification when new information is available in [address] 9. Click Sign Up. You can now view your medical record. Additional Information    Remember, [employer] is NOT to be used for urgent needs. For medical emergencies, dial 911. Now available from your iPhone and Android! Learning About ACE Inhibitors and ARBs for Diabetes  Introduction    ACE inhibitors and ARBs are medicines used to control blood pressure. They allow blood vessels to relax and open up. This lowers your blood pressure.   When you have diabetes, taking an ACE inhibitor or ARB can help to:  · Treat high blood pressure. Your risk of problems from diabetes goes up when you have high blood pressure. · Prevent or slow kidney damage. Diabetes can damage the blood vessels in the kidneys. High blood pressure can damage the kidneys, too. · Lower the risks of stroke and heart attack. Your risks go up when you have high blood pressure, heart disease, or both. An ACE inhibitor or ARB is a good choice for people with diabetes. Unlike some medicines, these don't affect blood sugar levels. Examples  ACE inhibitors include:  · Benazepril. · Lisinopril. · Ramipril. ARBs include:  · Irbesartan. · Losartan. · Telmisartan. Possible side effects  All medicines can cause side effects. Some side effects of ACE inhibitors include:  · Low blood pressure. You may feel dizzy and weak. · A cough. · High potassium levels. · An allergic reaction of the skin. Symptoms may range from mild swelling to painful welts. Some side effects of ARBs include:  · Diarrhea. · High potassium levels. · Sinus problems. · Stomach problems. You may have other side effects or reactions not listed here. Check the information that comes with your medicine. What to know about taking this medicine  · Be safe with medicines. Take your medicines exactly as prescribed. Call your doctor if you think you are having a problem with your medicine. · Before starting an ACE inhibitor or ARB, tell your doctor if you:  ¨ Use a salt substitute. ¨ Take diuretics or potassium tablets. · These medicines are not safe for pregnancy. If you are pregnant or planning to be, talk to your doctor about a safe blood pressure medicine. · ACE inhibitors can cause a dry cough. If the cough is bad, talk to your doctor. Switching to an ARB is likely to help. · Taking some medicines together can cause problems. Tell your doctor or pharmacist all the medicines you take.  This includes over-the-counter medicines, vitamins, herbal products, and supplements. · You may need regular blood and urine tests. Where can you learn more? Go to http://marshall-tom.info/. Enter M316 in the search box to learn more about \"Learning About ACE Inhibitors and ARBs for Diabetes. \"  Current as of: December 7, 2017  Content Version: 11.7  © 4668-5715 [employer]. Care instructions adapted under license by [employer] (which disclaims liability or warranty for this information). If you have questions about a medical condition or this instruction, always ask your healthcare professional. [address] any warranty or liability for your use of this information. Deciding Between Electrical Cardioversion and Rate Control Medicines for Atrial Fibrillation  Deciding Between Electrical Cardioversion and Rate Control Medicines for Atrial Fibrillation    What is atrial fibrillation? Atrial fibrillation (say \"AY-tree-sushant gyl-fubz-TQF-shun\") is a kind of uneven heartbeat. It can make you feel lightheaded and dizzy. You may feel weak. It also can make you more likely to have a stroke. Electrical cardioversion can return your heart to a normal rhythm. First you'll get medicines to make you sleepy and control pain. Then your doctor will use patches to send an electric current to your heart. This resets the rhythm of your heart. Not everyone with atrial fibrillation needs this treatment. For some people, taking medicines may be better. Most people can live with an uneven heartbeat. It just has to be kept under control so the heart does not beat too fast.  Use this information to help you and your doctor decide which treatment to choose for atrial fibrillation. What are enriquez points about this decision? · Electrical cardioversion can return your heart to a normal rhythm. But the problem can come back.  The longer you have had atrial fibrillation, the more likely it is to come back after this treatment. · Cardioversion may not work as well when an uneven heartbeat is caused by another heart disease, such as heart failure. · If your symptoms bother you a lot, you may want to try cardioversion. But even if it works, you may still need to take blood thinners to prevent a stroke. · If you don't have symptoms, or if they don't bother you much, you can try medicines to slow your heart rate. And you can take blood thinners to prevent a stroke. · Cardioversion does have risks, such as stroke. Discuss the risks with your doctor. Make sure you understand them. · You may have more than one heart problem. Cardioversion doesn't work as well if you have more than one heart problem. Why might you choose electrical cardioversion? · It restores the normal heart rhythm for most people. · The idea of having an electric shock does not bother you. · Your symptoms bother you a lot. · You have had atrial fibrillation just one time. · You do not have other heart problems. · You may not have to take as many medicines. Or you may not need to take them as long. Why might you choose rate-control medicines? · These medicines keep many people from having symptoms. · You prefer to take medicines rather than have an electric shock. · Your symptoms don't bother you much. · If these medicines don't work, you can still try electrical cardioversion. Your decision  Thinking about the facts and your feelings can help you make a decision that is right for you. Be sure you understand the benefits and risks of your options. And think about what else you need to do before you make the decision. Where can you learn more? Go to http://marshall-tom.info/. Enter P270 in the search box to learn more about \"Deciding Between Electrical Cardioversion and Rate Control Medicines for Atrial Fibrillation. \"  Current as of: December 6, 2017  Content Version: 11.7  © 4600-4659 [employer], [facility].  Care instructions adapted under license by [employer] (which disclaims liability or warranty for this information). If you have questions about a medical condition or this instruction, always ask your healthcare professional. [address] any warranty or liability for your use of this information. Learning About Atrial Fibrillation  What is atrial fibrillation? Atrial fibrillation (say \"AY-tree-sushant oas-rrbk-IIR-shun\") is the most common type of irregular heartbeat (arrhythmia). Normally, the heart beats in a strong, steady rhythm. In atrial fibrillation, a problem with the heart's electrical system causes the two upper parts of the heart (the atria) to quiver, or fibrillate. Your heart rate also may be faster than normal.  Atrial fibrillation can be dangerous because if the heartbeat isn't strong and steady, blood can collect, or pool, in the atria. And pooled blood is more likely to form clots. Clots can travel to the brain, block blood flow, and cause a stroke. Atrial fibrillation can also lead to heart failure. Treatment for atrial fibrillation helps prevent stroke and heart failure. It also helps relieve symptoms. Atrial fibrillation is often caused by another heart problem. It may happen after heart surgery. It may also be caused by other problems, such as an overactive thyroid gland or lung disease. Many people with atrial fibrillation are able to live full and active lives. What are the symptoms? Some people feel symptoms when they have episodes of atrial fibrillation. But other people don't notice any symptoms. If you have symptoms, you may feel:  · A fluttering, racing, or pounding feeling in your chest called palpitations. · Weak or tired. · Dizzy or lightheaded. · Short of breath. · Chest pain. · Confused. You may notice signs of atrial fibrillation when you check your pulse.  Your pulse may seem uneven or fast.  What can you expect when you have atrial fibrillation? At first, spells of atrial fibrillation may come on suddenly and last a short time. It may go away on its own or it goes away after treatment. This is called paroxysmal atrial fibrillation. Over time, the spells may last longer and occur more often. They often don't go away on their own. How is it treated? Treatments can help you feel better and prevent future problems, especially stroke and heart failure. The main types of treatment slow the heart rate, control the heart rhythm, and help prevent stroke. Your treatment will depend on the cause of your atrial fibrillation, your symptoms, and your risk for stroke. · Heart rate treatment. Medicine may be used to slow your heart rate. Your heartbeat may still be irregular. But these medicines keep your heart from beating too fast. They may also help relieve your symptoms. · Heart rhythm treatment. Different treatments may be used to try to stop atrial fibrillation and keep it from returning. They can also relieve symptoms. These treatments include medicine, electrical cardioversion to shock the heart back to a normal rhythm, a procedure called catheter ablation, and heart surgery. · Stroke prevention. You and your doctor can decide how to lower your risk. You may decide to take a blood-thinning medicine, such as aspirin or an anticoagulant. How can you live well with it? You can live well and help manage atrial fibrillation by having a heart-healthy lifestyle. To have a heart-healthy lifestyle:  · Don't smoke. · Eat heart-healthy foods. · Be active. Talk to your doctor about what type and level of exercise is safe for you. · Stay at a healthy weight. Lose weight if you need to. · Manage stress. · Avoid alcohol if it triggers symptoms. · Manage other health problems such as high blood pressure, high cholesterol, and diabetes. · Avoid getting sick from the flu. Get a flu shot every year. Where can you learn more?   Go to http://marshall-tom.info/. Enter [id number] in the search box to learn more about \"Learning About Atrial Fibrillation. \"  Current as of: December 6, 2017  Content Version: 11.7  © 6536-1665 [employer]. Care instructions adapted under license by [employer] (which disclaims liability or warranty for this information). If you have questions about a medical condition or this instruction, always ask your healthcare professional. [address] any warranty or liability for your use of this information. Counting Carbohydrates: Care Instructions  Your Care Instructions    You don't have to eat special foods when you have diabetes. You just have to be careful to eat healthy foods. Carbohydrates (carbs) raise blood sugar higher and quicker than any other nutrient. Carbs are found in desserts, breads and cereals, and fruit. They're also in starchy vegetables. These include potatoes, corn, and grains such as rice and pasta. Carbs are also in milk and yogurt. The more carbs you eat at one time, the higher your blood sugar will rise. Spreading carbs all through the day helps keep your blood sugar levels within your target range. Counting carbs is one of the best ways to keep your blood sugar under control. If you use insulin, counting carbs helps you match the right amount of insulin to the number of grams of carbs in a meal. Then you can change your diet and insulin dose as needed. Testing your blood sugar several times a day can help you learn how carbs affect your blood sugar. A registered dietitian or certified diabetes educator can help you plan meals and snacks. Follow-up care is a key part of your treatment and safety. Be sure to make and go to all appointments, and call your doctor if you are having problems. It's also a good idea to know your test results and keep a list of the medicines you take. How can you care for yourself at home?   Know your daily amount of carbohydrates  Your daily amount depends on several things, such as your weight, how active you are, which diabetes medicines you take, and what your goals are for your blood sugar levels. A registered dietitian or certified diabetes educator can help you plan how many carbs to include in each meal and snack. For most adults, a guideline for the daily amount of carbs is:  · 45 to 60 grams at each meal. That's about the same as 3 to 4 carbohydrate servings. · 15 to 20 grams at each snack. That's about the same as 1 carbohydrate serving. Count carbs  Counting carbs lets you know how much rapid-acting insulin to take before you eat. If you use an insulin pump, you get a constant rate of insulin during the day. So the pump must be programmed at meals. This gives you extra insulin to cover the rise in blood sugar after meals. If you take insulin:  · Learn your own insulin-to-carb ratio. You and your diabetes health professional will figure out the ratio. You can do this by testing your blood sugar after meals. For example, you may need a certain amount of insulin for every 15 grams of carbs. · Add up the carb grams in a meal. Then you can figure out how many units of insulin to take based on your insulin-to-carb ratio. · Exercise lowers blood sugar. You can use less insulin than you would if you were not doing exercise. Keep in mind that timing matters. If you exercise within 1 hour after a meal, your body may need less insulin for that meal than it would if you exercised 3 hours after the meal. Test your blood sugar to find out how exercise affects your need for insulin. If you do or don't take insulin:  · Look at labels on packaged foods. This can tell you how many carbs are in a serving. You can also use guides from the American Diabetes Association. · Be aware of portions, or serving sizes.  If a package has two servings and you eat the whole package, you need to double the number of grams of carbohydrate listed for one serving. · Protein, fat, and fiber do not raise blood sugar as much as carbs do. If you eat a lot of these nutrients in a meal, your blood sugar will rise more slowly than it would otherwise. Eat from all food groups  · Eat at least three meals a day. · Plan meals to include food from all the food groups. The food groups include grains, fruits, dairy, proteins, and vegetables. · Talk to your dietitian or diabetes educator about ways to add limited amounts of sweets into your meal plan. · If you drink alcohol, talk to your doctor. It may not be recommended when you are taking certain diabetes medicines. Where can you learn more? Go to http://marshall-tom.info/. Enter A751 in the search box to learn more about \"Counting Carbohydrates: Care Instructions. \"  Current as of: December 7, 2017  Content Version: 11.7  © 1344-6386 [employer]. Care instructions adapted under license by [employer] (which disclaims liability or warranty for this information). If you have questions about a medical condition or this instruction, always ask your healthcare professional. [place] [address] any warranty or liability for your use of this information. Gout: Care Instructions  Your Care Instructions    Gout is a form of arthritis caused by a buildup of uric acid crystals in a joint. It causes sudden attacks of pain, swelling, redness, and stiffness, usually in one joint, especially the big toe. Gout usually comes on without a cause. But it can be brought on by drinking alcohol (especially beer) or eating seafood and red meat. Taking certain medicines, such as diuretics or aspirin, also can bring on an attack of gout. Taking your medicines as prescribed and following up with your doctor regularly can help you avoid gout attacks in the future. Follow-up care is a key part of your treatment and safety.  Be sure to make and go to all appointments, and call your doctor if you are having problems. It's also a good idea to know your test results and keep a list of the medicines you take. How can you care for yourself at home? · If the joint is swollen, put ice or a cold pack on the area for 10 to 20 minutes at a time. Put a thin cloth between the ice and your skin. · Prop up the sore limb on a pillow when you ice it or anytime you sit or lie down during the next 3 days. Try to keep it above the level of your heart. This will help reduce swelling. · Rest sore joints. Avoid activities that put weight or strain on the joints for a few days. Take short rest breaks from your regular activities during the day. · Take your medicines exactly as prescribed. Call your doctor if you think you are having a problem with your medicine. · Take pain medicines exactly as directed. ¨ If the doctor gave you a prescription medicine for pain, take it as prescribed. ¨ If you are not taking a prescription pain medicine, ask your doctor if you can take an over-the-counter medicine. · Eat less seafood and red meat. · Check with your doctor before drinking alcohol. · Losing weight, if you are overweight, may help reduce attacks of gout. But do not go on a [employer]. \" Losing a lot of weight in a short amount of time can cause a gout attack. When should you call for help? Call your doctor now or seek immediate medical care if:    · You have a fever.     · The joint is so painful you cannot use it.     · You have sudden, unexplained swelling, redness, warmth, or severe pain in one or more joints.    Watch closely for changes in your health, and be sure to contact your doctor if:    · You have joint pain.     · Your symptoms get worse or are not improving after 2 or 3 days. Where can you learn more? Go to http://marshall-tom.info/. Enter L215 in the search box to learn more about \"Gout: Care Instructions. \"  Current as of: October 10, 2017  Content Version: 11.7  © 8360-7857 [employer]. Care instructions adapted under license by [employer] (which disclaims liability or warranty for this information). If you have questions about a medical condition or this instruction, always ask your healthcare professional. [address] any warranty or liability for your use of this information.

## 2018-09-29 NOTE — PROGRESS NOTES
Problem: Falls - Risk of  Goal: *Absence of Falls  Document Sam Fall Risk and appropriate interventions in the flowsheet.    Outcome: Progressing Towards Goal  Fall Risk Interventions:  Mobility Interventions: Bed/chair exit alarm, Communicate number of staff needed for ambulation/transfer, Patient to call before getting OOB, Utilize walker, cane, or other assistive device         Medication Interventions: Bed/chair exit alarm, Evaluate medications/consider consulting pharmacy, Patient to call before getting OOB, Teach patient to arise slowly    Elimination Interventions: Call light in reach, Patient to call for help with toileting needs, Toileting schedule/hourly rounds

## 2018-09-30 ENCOUNTER — HOME CARE VISIT (OUTPATIENT)
Dept: SCHEDULING | Facility: HOME HEALTH | Age: 78
End: 2018-09-30
Payer: MEDICARE

## 2018-09-30 PROCEDURE — 3331090001 HH PPS REVENUE CREDIT

## 2018-09-30 PROCEDURE — 3331090002 HH PPS REVENUE DEBIT

## 2018-09-30 PROCEDURE — G0299 HHS/HOSPICE OF RN EA 15 MIN: HCPCS

## 2018-09-30 PROCEDURE — 400013 HH SOC

## 2018-10-01 ENCOUNTER — HOME CARE VISIT (OUTPATIENT)
Dept: SCHEDULING | Facility: HOME HEALTH | Age: 78
End: 2018-10-01
Payer: MEDICARE

## 2018-10-01 VITALS
HEART RATE: 74 BPM | DIASTOLIC BLOOD PRESSURE: 76 MMHG | SYSTOLIC BLOOD PRESSURE: 124 MMHG | OXYGEN SATURATION: 93 % | TEMPERATURE: 97.4 F

## 2018-10-01 VITALS
SYSTOLIC BLOOD PRESSURE: 124 MMHG | OXYGEN SATURATION: 100 % | DIASTOLIC BLOOD PRESSURE: 70 MMHG | RESPIRATION RATE: 16 BRPM | TEMPERATURE: 98 F | HEART RATE: 60 BPM

## 2018-10-01 PROCEDURE — G0151 HHCP-SERV OF PT,EA 15 MIN: HCPCS

## 2018-10-01 PROCEDURE — 3331090001 HH PPS REVENUE CREDIT

## 2018-10-01 PROCEDURE — 3331090002 HH PPS REVENUE DEBIT

## 2018-10-01 PROCEDURE — G0152 HHCP-SERV OF OT,EA 15 MIN: HCPCS

## 2018-10-02 VITALS
TEMPERATURE: 99.2 F | HEART RATE: 79 BPM | DIASTOLIC BLOOD PRESSURE: 96 MMHG | SYSTOLIC BLOOD PRESSURE: 141 MMHG | OXYGEN SATURATION: 97 %

## 2018-10-02 LAB
BACTERIA SPEC CULT: NORMAL
BACTERIA SPEC CULT: NORMAL
SERVICE CMNT-IMP: NORMAL
SERVICE CMNT-IMP: NORMAL

## 2018-10-02 PROCEDURE — 3331090001 HH PPS REVENUE CREDIT

## 2018-10-02 PROCEDURE — 3331090002 HH PPS REVENUE DEBIT

## 2018-10-03 ENCOUNTER — HOME CARE VISIT (OUTPATIENT)
Dept: HOME HEALTH SERVICES | Facility: HOME HEALTH | Age: 78
End: 2018-10-03
Payer: MEDICARE

## 2018-10-03 ENCOUNTER — HOME CARE VISIT (OUTPATIENT)
Dept: SCHEDULING | Facility: HOME HEALTH | Age: 78
End: 2018-10-03
Payer: MEDICARE

## 2018-10-03 PROCEDURE — 3331090002 HH PPS REVENUE DEBIT

## 2018-10-03 PROCEDURE — G0157 HHC PT ASSISTANT EA 15: HCPCS

## 2018-10-03 PROCEDURE — 3331090001 HH PPS REVENUE CREDIT

## 2018-10-04 ENCOUNTER — HOME CARE VISIT (OUTPATIENT)
Dept: SCHEDULING | Facility: HOME HEALTH | Age: 78
End: 2018-10-04
Payer: MEDICARE

## 2018-10-04 VITALS
SYSTOLIC BLOOD PRESSURE: 134 MMHG | OXYGEN SATURATION: 98 % | DIASTOLIC BLOOD PRESSURE: 62 MMHG | RESPIRATION RATE: 18 BRPM | HEART RATE: 72 BPM | TEMPERATURE: 97.8 F

## 2018-10-04 VITALS
OXYGEN SATURATION: 97 % | HEART RATE: 77 BPM | SYSTOLIC BLOOD PRESSURE: 132 MMHG | DIASTOLIC BLOOD PRESSURE: 64 MMHG | TEMPERATURE: 98.6 F

## 2018-10-04 PROCEDURE — 3331090001 HH PPS REVENUE CREDIT

## 2018-10-04 PROCEDURE — 3331090002 HH PPS REVENUE DEBIT

## 2018-10-04 PROCEDURE — G0496 LPN CARE TRAIN/EDU IN HH: HCPCS

## 2018-10-05 ENCOUNTER — HOME CARE VISIT (OUTPATIENT)
Dept: SCHEDULING | Facility: HOME HEALTH | Age: 78
End: 2018-10-05
Payer: MEDICARE

## 2018-10-05 VITALS
TEMPERATURE: 98.3 F | HEART RATE: 5 BPM | SYSTOLIC BLOOD PRESSURE: 130 MMHG | OXYGEN SATURATION: 99 % | DIASTOLIC BLOOD PRESSURE: 87 MMHG

## 2018-10-05 PROCEDURE — 3331090001 HH PPS REVENUE CREDIT

## 2018-10-05 PROCEDURE — 3331090002 HH PPS REVENUE DEBIT

## 2018-10-05 PROCEDURE — G0158 HHC OT ASSISTANT EA 15: HCPCS

## 2018-10-06 PROCEDURE — 3331090002 HH PPS REVENUE DEBIT

## 2018-10-06 PROCEDURE — 3331090001 HH PPS REVENUE CREDIT

## 2018-10-07 PROCEDURE — 3331090001 HH PPS REVENUE CREDIT

## 2018-10-07 PROCEDURE — 3331090002 HH PPS REVENUE DEBIT

## 2018-10-08 ENCOUNTER — HOME CARE VISIT (OUTPATIENT)
Dept: SCHEDULING | Facility: HOME HEALTH | Age: 78
End: 2018-10-08
Payer: MEDICARE

## 2018-10-08 VITALS
DIASTOLIC BLOOD PRESSURE: 60 MMHG | SYSTOLIC BLOOD PRESSURE: 112 MMHG | TEMPERATURE: 99 F | HEART RATE: 82 BPM | OXYGEN SATURATION: 97 %

## 2018-10-08 PROCEDURE — G0157 HHC PT ASSISTANT EA 15: HCPCS

## 2018-10-08 PROCEDURE — 3331090001 HH PPS REVENUE CREDIT

## 2018-10-08 PROCEDURE — 3331090002 HH PPS REVENUE DEBIT

## 2018-10-09 ENCOUNTER — HOME CARE VISIT (OUTPATIENT)
Dept: SCHEDULING | Facility: HOME HEALTH | Age: 78
End: 2018-10-09
Payer: MEDICARE

## 2018-10-09 VITALS
TEMPERATURE: 97.8 F | DIASTOLIC BLOOD PRESSURE: 60 MMHG | RESPIRATION RATE: 20 BRPM | HEART RATE: 60 BPM | OXYGEN SATURATION: 98 % | SYSTOLIC BLOOD PRESSURE: 122 MMHG

## 2018-10-09 PROCEDURE — 3331090002 HH PPS REVENUE DEBIT

## 2018-10-09 PROCEDURE — 3331090001 HH PPS REVENUE CREDIT

## 2018-10-09 PROCEDURE — G0496 LPN CARE TRAIN/EDU IN HH: HCPCS

## 2018-10-10 ENCOUNTER — HOME CARE VISIT (OUTPATIENT)
Dept: SCHEDULING | Facility: HOME HEALTH | Age: 78
End: 2018-10-10
Payer: MEDICARE

## 2018-10-10 PROCEDURE — 3331090002 HH PPS REVENUE DEBIT

## 2018-10-10 PROCEDURE — G0158 HHC OT ASSISTANT EA 15: HCPCS

## 2018-10-10 PROCEDURE — G0157 HHC PT ASSISTANT EA 15: HCPCS

## 2018-10-10 PROCEDURE — 3331090001 HH PPS REVENUE CREDIT

## 2018-10-11 ENCOUNTER — HOME CARE VISIT (OUTPATIENT)
Dept: SCHEDULING | Facility: HOME HEALTH | Age: 78
End: 2018-10-11
Payer: MEDICARE

## 2018-10-11 VITALS
HEART RATE: 64 BPM | SYSTOLIC BLOOD PRESSURE: 130 MMHG | DIASTOLIC BLOOD PRESSURE: 65 MMHG | OXYGEN SATURATION: 99 % | TEMPERATURE: 98 F

## 2018-10-11 VITALS — HEART RATE: 65 BPM | DIASTOLIC BLOOD PRESSURE: 78 MMHG | OXYGEN SATURATION: 97 % | SYSTOLIC BLOOD PRESSURE: 138 MMHG

## 2018-10-11 VITALS
DIASTOLIC BLOOD PRESSURE: 60 MMHG | TEMPERATURE: 98.1 F | RESPIRATION RATE: 18 BRPM | OXYGEN SATURATION: 99 % | SYSTOLIC BLOOD PRESSURE: 115 MMHG | HEART RATE: 76 BPM

## 2018-10-11 PROCEDURE — G0158 HHC OT ASSISTANT EA 15: HCPCS

## 2018-10-11 PROCEDURE — 3331090001 HH PPS REVENUE CREDIT

## 2018-10-11 PROCEDURE — G0496 LPN CARE TRAIN/EDU IN HH: HCPCS

## 2018-10-11 PROCEDURE — 3331090002 HH PPS REVENUE DEBIT

## 2018-10-12 VITALS
SYSTOLIC BLOOD PRESSURE: 136 MMHG | DIASTOLIC BLOOD PRESSURE: 91 MMHG | HEART RATE: 72 BPM | OXYGEN SATURATION: 99 % | TEMPERATURE: 98.2 F

## 2018-10-12 PROCEDURE — 3331090001 HH PPS REVENUE CREDIT

## 2018-10-12 PROCEDURE — 3331090002 HH PPS REVENUE DEBIT

## 2018-10-13 PROCEDURE — 3331090002 HH PPS REVENUE DEBIT

## 2018-10-13 PROCEDURE — 3331090001 HH PPS REVENUE CREDIT

## 2018-10-14 PROCEDURE — 3331090001 HH PPS REVENUE CREDIT

## 2018-10-14 PROCEDURE — 3331090002 HH PPS REVENUE DEBIT

## 2018-10-15 PROCEDURE — 3331090001 HH PPS REVENUE CREDIT

## 2018-10-15 PROCEDURE — 3331090002 HH PPS REVENUE DEBIT

## 2018-10-16 ENCOUNTER — HOME CARE VISIT (OUTPATIENT)
Dept: SCHEDULING | Facility: HOME HEALTH | Age: 78
End: 2018-10-16
Payer: MEDICARE

## 2018-10-16 ENCOUNTER — HOME CARE VISIT (OUTPATIENT)
Dept: HOME HEALTH SERVICES | Facility: HOME HEALTH | Age: 78
End: 2018-10-16
Payer: MEDICARE

## 2018-10-16 VITALS
SYSTOLIC BLOOD PRESSURE: 134 MMHG | OXYGEN SATURATION: 98 % | DIASTOLIC BLOOD PRESSURE: 66 MMHG | RESPIRATION RATE: 18 BRPM | HEART RATE: 90 BPM | TEMPERATURE: 97.7 F

## 2018-10-16 VITALS
OXYGEN SATURATION: 98 % | SYSTOLIC BLOOD PRESSURE: 148 MMHG | TEMPERATURE: 98.7 F | HEART RATE: 110 BPM | DIASTOLIC BLOOD PRESSURE: 80 MMHG

## 2018-10-16 PROCEDURE — 3331090001 HH PPS REVENUE CREDIT

## 2018-10-16 PROCEDURE — G0157 HHC PT ASSISTANT EA 15: HCPCS

## 2018-10-16 PROCEDURE — 3331090002 HH PPS REVENUE DEBIT

## 2018-10-16 PROCEDURE — G0494 LPN CARE EA 15MIN HH/HOSPICE: HCPCS

## 2018-10-17 ENCOUNTER — HOME CARE VISIT (OUTPATIENT)
Dept: SCHEDULING | Facility: HOME HEALTH | Age: 78
End: 2018-10-17
Payer: MEDICARE

## 2018-10-17 VITALS
TEMPERATURE: 97.4 F | SYSTOLIC BLOOD PRESSURE: 124 MMHG | HEART RATE: 69 BPM | OXYGEN SATURATION: 94 % | DIASTOLIC BLOOD PRESSURE: 74 MMHG

## 2018-10-17 PROCEDURE — 3331090001 HH PPS REVENUE CREDIT

## 2018-10-17 PROCEDURE — G0158 HHC OT ASSISTANT EA 15: HCPCS

## 2018-10-17 PROCEDURE — G0151 HHCP-SERV OF PT,EA 15 MIN: HCPCS

## 2018-10-17 PROCEDURE — 3331090002 HH PPS REVENUE DEBIT

## 2018-10-18 ENCOUNTER — HOME CARE VISIT (OUTPATIENT)
Dept: SCHEDULING | Facility: HOME HEALTH | Age: 78
End: 2018-10-18
Payer: MEDICARE

## 2018-10-18 VITALS
OXYGEN SATURATION: 96 % | SYSTOLIC BLOOD PRESSURE: 132 MMHG | HEART RATE: 92 BPM | TEMPERATURE: 99.1 F | DIASTOLIC BLOOD PRESSURE: 81 MMHG

## 2018-10-18 PROCEDURE — 3331090001 HH PPS REVENUE CREDIT

## 2018-10-18 PROCEDURE — G0152 HHCP-SERV OF OT,EA 15 MIN: HCPCS

## 2018-10-18 PROCEDURE — 3331090002 HH PPS REVENUE DEBIT

## 2018-10-19 PROCEDURE — 3331090002 HH PPS REVENUE DEBIT

## 2018-10-19 PROCEDURE — 3331090001 HH PPS REVENUE CREDIT

## 2018-10-20 PROCEDURE — 3331090002 HH PPS REVENUE DEBIT

## 2018-10-20 PROCEDURE — 3331090001 HH PPS REVENUE CREDIT

## 2018-10-21 VITALS
OXYGEN SATURATION: 97 % | SYSTOLIC BLOOD PRESSURE: 123 MMHG | HEART RATE: 87 BPM | TEMPERATURE: 98.3 F | DIASTOLIC BLOOD PRESSURE: 78 MMHG

## 2018-10-22 ENCOUNTER — DOCUMENTATION ONLY (OUTPATIENT)
Dept: CARDIOLOGY CLINIC | Age: 78
End: 2018-10-22

## 2018-10-22 NOTE — PROGRESS NOTES
Unable to reach patient to schedule post hospital follow up with Dr. Chinyere Del Rio. Letter mailed.

## 2019-02-26 NOTE — PROGRESS NOTES
Problem: Falls - Risk of  Goal: *Absence of Falls  Document Sam Fall Risk and appropriate interventions in the flowsheet.    Outcome: Progressing Towards Goal  Fall Risk Interventions:            Medication Interventions: Patient to call before getting OOB    Elimination Interventions: Call light in reach, Patient to call for help with toileting needs Statement Selected

## 2019-07-06 ENCOUNTER — APPOINTMENT (OUTPATIENT)
Dept: GENERAL RADIOLOGY | Age: 79
End: 2019-07-06
Attending: EMERGENCY MEDICINE
Payer: MEDICARE

## 2019-07-06 ENCOUNTER — HOSPITAL ENCOUNTER (EMERGENCY)
Age: 79
Discharge: HOME | End: 2019-07-07
Attending: EMERGENCY MEDICINE
Payer: MEDICARE

## 2019-07-06 DIAGNOSIS — E86.0 DEHYDRATION: Primary | ICD-10-CM

## 2019-07-06 DIAGNOSIS — R53.1 GENERALIZED WEAKNESS: ICD-10-CM

## 2019-07-06 LAB
ALBUMIN SERPL-MCNC: 3.2 G/DL (ref 3.4–5)
ALBUMIN/GLOB SERPL: 0.7 {RATIO} (ref 0.8–1.7)
ALP SERPL-CCNC: 64 U/L (ref 45–117)
ALT SERPL-CCNC: 11 U/L (ref 13–56)
AMORPH CRY URNS QL MICRO: ABNORMAL
ANION GAP SERPL CALC-SCNC: 13 MMOL/L (ref 3–18)
APPEARANCE UR: CLEAR
AST SERPL-CCNC: 10 U/L (ref 15–37)
BACTERIA URNS QL MICRO: NEGATIVE /HPF
BASOPHILS # BLD: 0 K/UL (ref 0–0.1)
BASOPHILS NFR BLD: 0 % (ref 0–2)
BILIRUB SERPL-MCNC: 0.5 MG/DL (ref 0.2–1)
BILIRUB UR QL: NEGATIVE
BUN SERPL-MCNC: 31 MG/DL (ref 7–18)
BUN/CREAT SERPL: 24 (ref 12–20)
CALCIUM SERPL-MCNC: 9.5 MG/DL (ref 8.5–10.1)
CHLORIDE SERPL-SCNC: 100 MMOL/L (ref 100–108)
CK SERPL-CCNC: 61 U/L (ref 26–192)
CO2 SERPL-SCNC: 23 MMOL/L (ref 21–32)
COLOR UR: ABNORMAL
CREAT SERPL-MCNC: 1.28 MG/DL (ref 0.6–1.3)
DIFFERENTIAL METHOD BLD: ABNORMAL
EOSINOPHIL # BLD: 0 K/UL (ref 0–0.4)
EOSINOPHIL NFR BLD: 0 % (ref 0–5)
EPITH CASTS URNS QL MICRO: ABNORMAL /LPF (ref 0–5)
ERYTHROCYTE [DISTWIDTH] IN BLOOD BY AUTOMATED COUNT: 13.1 % (ref 11.6–14.5)
GLOBULIN SER CALC-MCNC: 4.7 G/DL (ref 2–4)
GLUCOSE SERPL-MCNC: 119 MG/DL (ref 74–99)
GLUCOSE UR STRIP.AUTO-MCNC: NEGATIVE MG/DL
HCT VFR BLD AUTO: 32.8 % (ref 35–45)
HGB BLD-MCNC: 11.1 G/DL (ref 12–16)
HGB UR QL STRIP: NEGATIVE
HYALINE CASTS URNS QL MICRO: POSITIVE /LPF (ref 0–2)
KETONES UR QL STRIP.AUTO: 15 MG/DL
LACTATE BLD-SCNC: 1.06 MMOL/L (ref 0.4–2)
LEUKOCYTE ESTERASE UR QL STRIP.AUTO: NEGATIVE
LYMPHOCYTES # BLD: 1.1 K/UL (ref 0.9–3.6)
LYMPHOCYTES NFR BLD: 11 % (ref 21–52)
MCH RBC QN AUTO: 31.6 PG (ref 24–34)
MCHC RBC AUTO-ENTMCNC: 33.8 G/DL (ref 31–37)
MCV RBC AUTO: 93.4 FL (ref 74–97)
MONOCYTES # BLD: 1.2 K/UL (ref 0.05–1.2)
MONOCYTES NFR BLD: 12 % (ref 3–10)
MUCOUS THREADS URNS QL MICRO: ABNORMAL /LPF
NEUTS SEG # BLD: 7.5 K/UL (ref 1.8–8)
NEUTS SEG NFR BLD: 77 % (ref 40–73)
NITRITE UR QL STRIP.AUTO: NEGATIVE
PH UR STRIP: 5.5 [PH] (ref 5–8)
PLATELET # BLD AUTO: 290 K/UL (ref 135–420)
PMV BLD AUTO: 12.1 FL (ref 9.2–11.8)
POTASSIUM SERPL-SCNC: 3.5 MMOL/L (ref 3.5–5.5)
PROT SERPL-MCNC: 7.9 G/DL (ref 6.4–8.2)
PROT UR STRIP-MCNC: 100 MG/DL
RBC # BLD AUTO: 3.51 M/UL (ref 4.2–5.3)
RBC #/AREA URNS HPF: ABNORMAL /HPF (ref 0–5)
SODIUM SERPL-SCNC: 136 MMOL/L (ref 136–145)
SP GR UR REFRACTOMETRY: 1.02 (ref 1–1.03)
TROPONIN I SERPL-MCNC: 0.04 NG/ML (ref 0–0.04)
UROBILINOGEN UR QL STRIP.AUTO: 1 EU/DL (ref 0.2–1)
WBC # BLD AUTO: 9.8 K/UL (ref 4.6–13.2)
WBC URNS QL MICRO: NEGATIVE /HPF (ref 0–4)

## 2019-07-06 PROCEDURE — 93005 ELECTROCARDIOGRAM TRACING: CPT

## 2019-07-06 PROCEDURE — 96360 HYDRATION IV INFUSION INIT: CPT

## 2019-07-06 PROCEDURE — 80053 COMPREHEN METABOLIC PANEL: CPT

## 2019-07-06 PROCEDURE — 82550 ASSAY OF CK (CPK): CPT

## 2019-07-06 PROCEDURE — 74011250636 HC RX REV CODE- 250/636: Performed by: EMERGENCY MEDICINE

## 2019-07-06 PROCEDURE — 84484 ASSAY OF TROPONIN QUANT: CPT

## 2019-07-06 PROCEDURE — 99285 EMERGENCY DEPT VISIT HI MDM: CPT

## 2019-07-06 PROCEDURE — 85025 COMPLETE CBC W/AUTO DIFF WBC: CPT

## 2019-07-06 PROCEDURE — 51701 INSERT BLADDER CATHETER: CPT

## 2019-07-06 PROCEDURE — 77030005563 HC CATH URETH INT MMGH -A

## 2019-07-06 PROCEDURE — 81001 URINALYSIS AUTO W/SCOPE: CPT

## 2019-07-06 PROCEDURE — 71045 X-RAY EXAM CHEST 1 VIEW: CPT

## 2019-07-06 PROCEDURE — 83605 ASSAY OF LACTIC ACID: CPT

## 2019-07-06 RX ADMIN — SODIUM CHLORIDE 1000 ML: 900 INJECTION, SOLUTION INTRAVENOUS at 23:32

## 2019-07-07 VITALS
HEIGHT: 62 IN | SYSTOLIC BLOOD PRESSURE: 180 MMHG | OXYGEN SATURATION: 100 % | BODY MASS INDEX: 27.6 KG/M2 | RESPIRATION RATE: 25 BRPM | HEART RATE: 102 BPM | DIASTOLIC BLOOD PRESSURE: 83 MMHG | TEMPERATURE: 99.3 F | WEIGHT: 150 LBS

## 2019-07-07 LAB
ATRIAL RATE: 104 BPM
CALCULATED P AXIS, ECG09: 30 DEGREES
CALCULATED R AXIS, ECG10: 26 DEGREES
CALCULATED T AXIS, ECG11: 59 DEGREES
DIAGNOSIS, 93000: NORMAL
P-R INTERVAL, ECG05: 130 MS
Q-T INTERVAL, ECG07: 346 MS
QRS DURATION, ECG06: 72 MS
QTC CALCULATION (BEZET), ECG08: 454 MS
VENTRICULAR RATE, ECG03: 104 BPM

## 2019-07-07 PROCEDURE — 74011250637 HC RX REV CODE- 250/637: Performed by: EMERGENCY MEDICINE

## 2019-07-07 RX ORDER — IBUPROFEN 600 MG/1
600 TABLET ORAL
Status: COMPLETED | OUTPATIENT
Start: 2019-07-07 | End: 2019-07-07

## 2019-07-07 RX ADMIN — IBUPROFEN 600 MG: 600 TABLET, FILM COATED ORAL at 00:11

## 2019-07-07 NOTE — DISCHARGE INSTRUCTIONS
Patient Education        Dehydration: Care Instructions  Your Care Instructions  Dehydration happens when your body loses too much fluid. This might happen when you do not drink enough water or you lose large amounts of fluids from your body because of diarrhea, vomiting, or sweating. Severe dehydration can be life-threatening. Water and minerals called electrolytes help put your body fluids back in balance. Learn the early signs of fluid loss, and drink more fluids to prevent dehydration. Follow-up care is a key part of your treatment and safety. Be sure to make and go to all appointments, and call your doctor if you are having problems. It's also a good idea to know your test results and keep a list of the medicines you take. How can you care for yourself at home? · To prevent dehydration, drink plenty of fluids, enough so that your urine is light yellow or clear like water. Choose water and other caffeine-free clear liquids until you feel better. If you have kidney, heart, or liver disease and have to limit fluids, talk with your doctor before you increase the amount of fluids you drink. · If you do not feel like eating or drinking, try taking small sips of water, sports drinks, or other rehydration drinks. · Get plenty of rest.  To prevent dehydration  · Add more fluids to your diet and daily routine, unless your doctor has told you not to. · During hot weather, drink more fluids. Drink even more fluids if you exercise a lot. Stay away from drinks with alcohol or caffeine. · Watch for the symptoms of dehydration. These include:  ? A dry, sticky mouth. ? Dark yellow urine, and not much of it. ? Dry and sunken eyes. ? Feeling very tired. · Learn what problems can lead to dehydration. These include:  ? Diarrhea, fever, and vomiting. ? Any illness with a fever, such as pneumonia or the flu. ?  Activities that cause heavy sweating, such as endurance races and heavy outdoor work in hot or humid weather. ? Alcohol or drug abuse or withdrawal.  ? Certain medicines, such as cold and allergy pills (antihistamines), diet pills (diuretics), and laxatives. ? Certain diseases, such as diabetes, cancer, and heart or kidney disease. When should you call for help? Call 911 anytime you think you may need emergency care. For example, call if:    · You passed out (lost consciousness).    Call your doctor now or seek immediate medical care if:    · You are confused and cannot think clearly.     · You are dizzy or lightheaded, or you feel like you may faint.     · You have signs of needing more fluids. You have sunken eyes and a dry mouth, and you pass only a little dark urine.     · You cannot keep fluids down.    Watch closely for changes in your health, and be sure to contact your doctor if:    · You are not making tears.     · Your skin is very dry and sags slowly back into place after you pinch it.     · Your mouth and eyes are very dry. Where can you learn more? Go to http://marshall-tom.info/. Enter J692 in the search box to learn more about \"Dehydration: Care Instructions. \"  Current as of: September 23, 2018  Content Version: 11.9  © 2519-3030 [employer]. Care instructions adapted under license by [employer] (which disclaims liability or warranty for this information). If you have questions about a medical condition or this instruction, always ask your healthcare professional. [place] [address] any warranty or liability for your use of this information.

## 2019-07-07 NOTE — ED TRIAGE NOTES
Pt brought in via EMS for c/o bilateral arm and leg pain, and neck pain x2 days. Per EMS, family on scene stated that pt did not get out of her chair for 2 days. Noted pt smells strongly of urine, swelling noted bilaterally but more swelling noted on Left side which pt states is all new.

## 2019-07-07 NOTE — ED NOTES
I have reviewed discharge instructions with the patient and caregiver (daughter). The patient and caregiver (daughter) verbalized understanding. Pt taken out of ED in wheelchair being driven home by family.

## 2019-07-07 NOTE — ED PROVIDER NOTES
69 yo AAF with PMHx DM, HTN presents by EMS with uncertain complaints. Pt states she has been sitting in a chair for a couple days and didn't feel like moving. Pt only complaint is some generalized edema to bilateral hands and feet. Pt states that her daughter came to see her and thought pt needed to come to ED. No fevers, no vomiting, no chest pain. Past Medical History:   Diagnosis Date    Diabetes ([facility] [address]     Hypercholesteremia     Hypertension     Multifocal atrial tachycardia ([facility] [address] 09/2018       Past Surgical History:   Procedure Laterality Date    HX CARPAL TUNNEL RELEASE      HX ORTHOPAEDIC  april 2013    HX OTHER SURGICAL      left arm nerve surgery         Family History:   Problem Relation Age of Onset    Diabetes Other     Hypertension Other        Social History     Socioeconomic History    Marital status:      Spouse name: Not on file    Number of children: Not on file    Years of education: Not on file    Highest education level: Not on file   Occupational History    Not on file   Social Needs    Financial resource strain: Not on file    Food insecurity:     Worry: Not on file     Inability: Not on file    Transportation needs:     Medical: Not on file     Non-medical: Not on file   Tobacco Use    Smoking status: Never Smoker    Smokeless tobacco: Never Used   Substance and Sexual Activity    Alcohol use:  Yes    Drug use: No    Sexual activity: Not on file   Lifestyle    Physical activity:     Days per week: Not on file     Minutes per session: Not on file    Stress: Not on file   Relationships    Social connections:     Talks on phone: Not on file     Gets together: Not on file     Attends Sabianist service: Not on file     Active member of club or organization: Not on file     Attends meetings of clubs or organizations: Not on file     Relationship status: Not on file    Intimate partner violence:     Fear of current or ex partner: Not on file Emotionally abused: Not on file     Physically abused: Not on file     Forced sexual activity: Not on file   Other Topics Concern    Not on file   Social History Narrative    Not on file         ALLERGIES: Patient has no known allergies. Review of Systems   Constitutional: Negative for fever. HENT: Negative for trouble swallowing. Respiratory: Negative for shortness of breath. Cardiovascular: Positive for leg swelling. Negative for chest pain. Gastrointestinal: Negative for abdominal pain, diarrhea and vomiting. Genitourinary: Negative for difficulty urinating. Musculoskeletal: Negative for back pain and neck pain. Skin: Negative for wound. Neurological: Negative for syncope. Psychiatric/Behavioral: Negative for behavioral problems. All other systems reviewed and are negative. Vitals:    07/06/19 2345 07/07/19 0000 07/07/19 0015 07/07/19 0030   BP: 194/89 (!) 205/86 192/87 186/79   Pulse: 94 95 91 92   Resp: 22 24 23 23   Temp:       SpO2:  100%  100%   Weight:       Height:                Physical Exam   Constitutional: She is oriented to person, place, and time. She appears well-developed. No distress. Chronically ill-appearing, nad   HENT:   Head: Normocephalic and atraumatic. Mouth/Throat: Mucous membranes are dry. Eyes: EOM are normal.   Neck: Normal range of motion. Cardiovascular: Intact distal pulses. Tachycardia present. Pulmonary/Chest: Effort normal and breath sounds normal. No respiratory distress. Abdominal: Soft. There is no tenderness. Musculoskeletal: Normal range of motion. Mechanically stable   Neurological: She is alert and oriented to person, place, and time. No focal deficits noted   Skin: Skin is warm. Psychiatric: Her behavior is normal.   Nursing note and vitals reviewed.        MDM  Number of Diagnoses or Management Options  Dehydration:   Generalized weakness:   Diagnosis management comments: 69 yo AAF with PMHx DM, HTN presents by EMS with uncertain complaints/generalized weakness. No fevers, no chest pain. Examination with mild tachycardia, dry mucous membranes, but otherwise unremarkable. Will evaluate for acute process. 1:38 AM  Work-up suggestive of dehydration, otherwise unremarkable. IVF given in ED. Pt doing ok. Family concerned about pt general well-being, ability to care for self. No evidence of emergent medical condition clinically. Pt essentially with adult failure to thrive and evaluation for SNF placement with pcp may be beneficial.  I discussed results and poc for dc home, symptom management, follow-up, return precautions. Amount and/or Complexity of Data Reviewed  Clinical lab tests: ordered and reviewed  Tests in the radiology section of CPT®: ordered and reviewed  Obtain history from someone other than the patient: yes  Review and summarize past medical records: yes  Independent visualization of images, tracings, or specimens: yes    Patient Progress  Patient progress: stable         EKG  Date/Time: 7/6/2019 11:19 PM  Performed by: Maria Teresa Watts MD  Authorized by: Maria Teresa Watts MD     ECG reviewed by ED Physician in the absence of a cardiologist: yes    Previous ECG:     Previous ECG:  Compared to current    Comparison ECG info:  Non-specific    Similarity:  Changes noted  Interpretation:     Interpretation: non-specific    Rate:     ECG rate:  104    ECG rate assessment: tachycardic    Rhythm:     Rhythm: sinus tachycardia    Ectopy:     Ectopy: none    QRS:     QRS axis:  Normal  Conduction:     Conduction: normal    ST segments:     ST segments:  Normal  T waves:     T waves: normal          PROGRESS NOTES    11:17 PM:   Maria Teresa Watts MD arrives to the bedside to evaluate the patient. Answered the patient's questions regarding the treatment plan.       CONSULTATIONS  None      MEDICATIONS ORDERED  Medications   sodium chloride 0.9 % bolus infusion 1,000 mL (0 mL IntraVENous IV Completed 7/7/19 0030) ibuprofen (MOTRIN) tablet 600 mg (600 mg Oral Given 7/7/19 0011)       RADIOLOGY INTERPRETATIONS  XR CHEST PORT    (Results Pending)       EKG READINGS/LABORATORY RESULTS  Recent Results (from the past 12 hour(s))   POC LACTIC ACID    Collection Time: 07/06/19 11:00 PM   Result Value Ref Range    Lactic Acid (POC) 1.06 0.40 - 2.00 mmol/L   EKG, 12 LEAD, INITIAL    Collection Time: 07/06/19 11:00 PM   Result Value Ref Range    Ventricular Rate 104 BPM    Atrial Rate 104 BPM    P-R Interval 130 ms    QRS Duration 72 ms    Q-T Interval 346 ms    QTC Calculation (Bezet) 454 ms    Calculated P Axis 30 degrees    Calculated R Axis 26 degrees    Calculated T Axis 59 degrees    Diagnosis       Sinus tachycardia  Possible Left atrial enlargement  Left ventricular hypertrophy  Nonspecific T wave abnormality  Abnormal ECG  When compared with ECG of 28-SEP-2018 06:22,  premature atrial complexes are no longer present  Vent. rate has increased BY  39 BPM     CBC WITH AUTOMATED DIFF    Collection Time: 07/06/19 11:10 PM   Result Value Ref Range    WBC 9.8 4.6 - 13.2 K/uL    RBC 3.51 (L) 4.20 - 5.30 M/uL    HGB 11.1 (L) 12.0 - 16.0 g/dL    HCT 32.8 (L) 35.0 - 45.0 %    MCV 93.4 74.0 - 97.0 FL    MCH 31.6 24.0 - 34.0 PG    MCHC 33.8 31.0 - 37.0 g/dL    RDW 13.1 11.6 - 14.5 %    PLATELET 760 404 - 491 K/uL    MPV 12.1 (H) 9.2 - 11.8 FL    NEUTROPHILS 77 (H) 40 - 73 %    LYMPHOCYTES 11 (L) 21 - 52 %    MONOCYTES 12 (H) 3 - 10 %    EOSINOPHILS 0 0 - 5 %    BASOPHILS 0 0 - 2 %    ABS. NEUTROPHILS 7.5 1.8 - 8.0 K/UL    ABS. LYMPHOCYTES 1.1 0.9 - 3.6 K/UL    ABS. MONOCYTES 1.2 0.05 - 1.2 K/UL    ABS. EOSINOPHILS 0.0 0.0 - 0.4 K/UL    ABS.  BASOPHILS 0.0 0.0 - 0.1 K/UL    DF AUTOMATED     METABOLIC PANEL, COMPREHENSIVE    Collection Time: 07/06/19 11:10 PM   Result Value Ref Range    Sodium 136 136 - 145 mmol/L    Potassium 3.5 3.5 - 5.5 mmol/L    Chloride 100 100 - 108 mmol/L    CO2 23 21 - 32 mmol/L    Anion gap 13 3.0 - 18 mmol/L Glucose 119 (H) 74 - 99 mg/dL    BUN 31 (H) 7.0 - 18 MG/DL    Creatinine 1.28 0.6 - 1.3 MG/DL    BUN/Creatinine ratio 24 (H) 12 - 20      GFR est AA 49 (L) >60 ml/min/1.73m2    GFR est non-AA 41 (L) >60 ml/min/1.73m2    Calcium 9.5 8.5 - 10.1 MG/DL    Bilirubin, total 0.5 0.2 - 1.0 MG/DL    ALT (SGPT) 11 (L) 13 - 56 U/L    AST (SGOT) 10 (L) 15 - 37 U/L    Alk. phosphatase 64 45 - 117 U/L    Protein, total 7.9 6.4 - 8.2 g/dL    Albumin 3.2 (L) 3.4 - 5.0 g/dL    Globulin 4.7 (H) 2.0 - 4.0 g/dL    A-G Ratio 0.7 (L) 0.8 - 1.7     TROPONIN I    Collection Time: 07/06/19 11:10 PM   Result Value Ref Range    Troponin-I, QT 0.04 0.0 - 0.045 NG/ML   CK    Collection Time: 07/06/19 11:10 PM   Result Value Ref Range    CK 61 26 - 192 U/L   URINALYSIS W/ RFLX MICROSCOPIC    Collection Time: 07/06/19 11:20 PM   Result Value Ref Range    Color DARK YELLOW      Appearance CLEAR      Specific gravity 1.022 1.005 - 1.030      pH (UA) 5.5 5.0 - 8.0      Protein 100 (A) NEG mg/dL    Glucose NEGATIVE  NEG mg/dL    Ketone 15 (A) NEG mg/dL    Bilirubin NEGATIVE  NEG      Blood NEGATIVE  NEG      Urobilinogen 1.0 0.2 - 1.0 EU/dL    Nitrites NEGATIVE  NEG      Leukocyte Esterase NEGATIVE  NEG     URINE MICROSCOPIC ONLY    Collection Time: 07/06/19 11:20 PM   Result Value Ref Range    WBC NEGATIVE  0 - 4 /hpf    RBC 0 to 3 0 - 5 /hpf    Epithelial cells FEW 0 - 5 /lpf    Bacteria NEGATIVE  NEG /hpf    Mucus 1+ (A) NEG /lpf    Amorphous Crystals FEW (A) NEG      Hyaline cast POSITIVE 0 - 2 /lpf       ED DIAGNOSIS & DISPOSITION INFORMATION  Diagnosis:   1. Dehydration    2.  Generalized weakness        Disposition: Discharged    Follow-up Information     Follow up With Specialties Details Why Contact Info    Deacon Kelvin, MD Family Practice Schedule an appointment as soon as possible for a visit  [place] [address] [address] [place] [address] [phone]      [facility] EMERGENCY DEPT Emergency Medicine  As needed [address]  [phone]          Patient's Medications   Start Taking    No medications on file   Continue Taking    ALLOPURINOL (ZYLOPRIM) 100 MG TABLET    Take 1 Tab by mouth daily. AMLODIPINE (NORVASC) 10 MG TABLET    Take 10 mg by mouth daily. ASPIRIN 81 MG TABLET    Take 81 mg by mouth. ATORVASTATIN (LIPITOR) 40 MG TABLET    Take 1 Tab by mouth daily. DULOXETINE (CYMBALTA) 60 MG CAPSULE    Take 60 mg by mouth daily. FOLIC ACID 373 MCG TABLET    Take 800 mcg by mouth daily. IBUPROFEN (MOTRIN) 600 MG TABLET    Take 1 Tab by mouth every six (6) hours as needed. Indications: Pain    METOPROLOL TARTRATE (LOPRESSOR) 100 MG IR TABLET    Take 1 Tab by mouth two (2) times a day. NAPROXEN (NAPROSYN) 500 MG TABLET    Take 500 mg by mouth two (2) times daily as needed (pain). with food   Indications: Pain    PANTOPRAZOLE (PROTONIX) 40 MG TABLET    Take 1 Tab by mouth daily.    These Medications have changed    No medications on file   Stop Taking    No medications on file           Terrance Carney MD.

## 2019-07-08 ENCOUNTER — APPOINTMENT (OUTPATIENT)
Dept: CT IMAGING | Age: 79
End: 2019-07-08
Attending: EMERGENCY MEDICINE
Payer: MEDICARE

## 2019-07-08 ENCOUNTER — HOSPITAL ENCOUNTER (EMERGENCY)
Age: 79
Discharge: HOME | End: 2019-07-08
Attending: EMERGENCY MEDICINE
Payer: MEDICARE

## 2019-07-08 ENCOUNTER — APPOINTMENT (OUTPATIENT)
Dept: GENERAL RADIOLOGY | Age: 79
End: 2019-07-08
Attending: EMERGENCY MEDICINE
Payer: MEDICARE

## 2019-07-08 VITALS
WEIGHT: 164 LBS | BODY MASS INDEX: 30.96 KG/M2 | DIASTOLIC BLOOD PRESSURE: 84 MMHG | HEART RATE: 103 BPM | TEMPERATURE: 97.6 F | OXYGEN SATURATION: 100 % | HEIGHT: 61 IN | RESPIRATION RATE: 19 BRPM | SYSTOLIC BLOOD PRESSURE: 172 MMHG

## 2019-07-08 DIAGNOSIS — E86.0 DEHYDRATION: Primary | ICD-10-CM

## 2019-07-08 DIAGNOSIS — M25.512 PAIN OF BOTH SHOULDER JOINTS: ICD-10-CM

## 2019-07-08 DIAGNOSIS — M25.511 PAIN OF BOTH SHOULDER JOINTS: ICD-10-CM

## 2019-07-08 LAB
ALBUMIN SERPL-MCNC: 3 G/DL (ref 3.4–5)
ALBUMIN/GLOB SERPL: 0.6 {RATIO} (ref 0.8–1.7)
ALP SERPL-CCNC: 64 U/L (ref 45–117)
ALT SERPL-CCNC: 11 U/L (ref 13–56)
ANION GAP SERPL CALC-SCNC: 13 MMOL/L (ref 3–18)
APPEARANCE UR: CLEAR
AST SERPL-CCNC: 12 U/L (ref 15–37)
ATRIAL RATE: 95 BPM
BACTERIA URNS QL MICRO: NEGATIVE /HPF
BASOPHILS # BLD: 0 K/UL (ref 0–0.1)
BASOPHILS NFR BLD: 0 % (ref 0–2)
BILIRUB DIRECT SERPL-MCNC: 0.1 MG/DL (ref 0–0.2)
BILIRUB SERPL-MCNC: 0.3 MG/DL (ref 0.2–1)
BILIRUB UR QL: NEGATIVE
BNP SERPL-MCNC: 415 PG/ML (ref 0–1800)
BUN SERPL-MCNC: 47 MG/DL (ref 7–18)
BUN/CREAT SERPL: 25 (ref 12–20)
CALCIUM SERPL-MCNC: 9.7 MG/DL (ref 8.5–10.1)
CALCULATED P AXIS, ECG09: 57 DEGREES
CALCULATED R AXIS, ECG10: 17 DEGREES
CALCULATED T AXIS, ECG11: 75 DEGREES
CHLORIDE SERPL-SCNC: 104 MMOL/L (ref 100–108)
CO2 SERPL-SCNC: 23 MMOL/L (ref 21–32)
COLOR UR: ABNORMAL
CREAT SERPL-MCNC: 1.89 MG/DL (ref 0.6–1.3)
DIAGNOSIS, 93000: NORMAL
DIFFERENTIAL METHOD BLD: ABNORMAL
EOSINOPHIL # BLD: 0 K/UL (ref 0–0.4)
EOSINOPHIL NFR BLD: 0 % (ref 0–5)
EPITH CASTS URNS QL MICRO: ABNORMAL /LPF (ref 0–5)
ERYTHROCYTE [DISTWIDTH] IN BLOOD BY AUTOMATED COUNT: 13.6 % (ref 11.6–14.5)
GLOBULIN SER CALC-MCNC: 4.7 G/DL (ref 2–4)
GLUCOSE SERPL-MCNC: 118 MG/DL (ref 74–99)
GLUCOSE UR STRIP.AUTO-MCNC: NEGATIVE MG/DL
HCT VFR BLD AUTO: 32.7 % (ref 35–45)
HGB BLD-MCNC: 11.1 G/DL (ref 12–16)
HGB UR QL STRIP: NEGATIVE
HYALINE CASTS URNS QL MICRO: ABNORMAL /LPF (ref 0–2)
KETONES UR QL STRIP.AUTO: NEGATIVE MG/DL
LEUKOCYTE ESTERASE UR QL STRIP.AUTO: ABNORMAL
LIPASE SERPL-CCNC: 350 U/L (ref 73–393)
LYMPHOCYTES # BLD: 0.7 K/UL (ref 0.9–3.6)
LYMPHOCYTES NFR BLD: 7 % (ref 21–52)
MAGNESIUM SERPL-MCNC: 2.3 MG/DL (ref 1.6–2.6)
MCH RBC QN AUTO: 31.8 PG (ref 24–34)
MCHC RBC AUTO-ENTMCNC: 33.9 G/DL (ref 31–37)
MCV RBC AUTO: 93.7 FL (ref 74–97)
MONOCYTES # BLD: 1 K/UL (ref 0.05–1.2)
MONOCYTES NFR BLD: 9 % (ref 3–10)
MUCOUS THREADS URNS QL MICRO: ABNORMAL /LPF
NEUTS SEG # BLD: 8.9 K/UL (ref 1.8–8)
NEUTS SEG NFR BLD: 84 % (ref 40–73)
NITRITE UR QL STRIP.AUTO: NEGATIVE
P-R INTERVAL, ECG05: 140 MS
PH UR STRIP: 5 [PH] (ref 5–8)
PLATELET # BLD AUTO: 301 K/UL (ref 135–420)
PMV BLD AUTO: 10.8 FL (ref 9.2–11.8)
POTASSIUM SERPL-SCNC: 3.7 MMOL/L (ref 3.5–5.5)
PROT SERPL-MCNC: 7.7 G/DL (ref 6.4–8.2)
PROT UR STRIP-MCNC: 100 MG/DL
Q-T INTERVAL, ECG07: 356 MS
QRS DURATION, ECG06: 74 MS
QTC CALCULATION (BEZET), ECG08: 447 MS
RBC # BLD AUTO: 3.49 M/UL (ref 4.2–5.3)
RBC #/AREA URNS HPF: NEGATIVE /HPF (ref 0–5)
SODIUM SERPL-SCNC: 140 MMOL/L (ref 136–145)
SP GR UR REFRACTOMETRY: 1.02 (ref 1–1.03)
TROPONIN I SERPL-MCNC: 0.04 NG/ML (ref 0–0.04)
UROBILINOGEN UR QL STRIP.AUTO: 1 EU/DL (ref 0.2–1)
VENTRICULAR RATE, ECG03: 95 BPM
WBC # BLD AUTO: 10.7 K/UL (ref 4.6–13.2)
WBC URNS QL MICRO: ABNORMAL /HPF (ref 0–4)

## 2019-07-08 PROCEDURE — 83880 ASSAY OF NATRIURETIC PEPTIDE: CPT

## 2019-07-08 PROCEDURE — 83735 ASSAY OF MAGNESIUM: CPT

## 2019-07-08 PROCEDURE — 80048 BASIC METABOLIC PNL TOTAL CA: CPT

## 2019-07-08 PROCEDURE — 96360 HYDRATION IV INFUSION INIT: CPT

## 2019-07-08 PROCEDURE — 99285 EMERGENCY DEPT VISIT HI MDM: CPT

## 2019-07-08 PROCEDURE — 80076 HEPATIC FUNCTION PANEL: CPT

## 2019-07-08 PROCEDURE — 83690 ASSAY OF LIPASE: CPT

## 2019-07-08 PROCEDURE — 93005 ELECTROCARDIOGRAM TRACING: CPT

## 2019-07-08 PROCEDURE — 71045 X-RAY EXAM CHEST 1 VIEW: CPT

## 2019-07-08 PROCEDURE — 70450 CT HEAD/BRAIN W/O DYE: CPT

## 2019-07-08 PROCEDURE — 81001 URINALYSIS AUTO W/SCOPE: CPT

## 2019-07-08 PROCEDURE — 74011250636 HC RX REV CODE- 250/636: Performed by: EMERGENCY MEDICINE

## 2019-07-08 PROCEDURE — 84484 ASSAY OF TROPONIN QUANT: CPT

## 2019-07-08 PROCEDURE — 85025 COMPLETE CBC W/AUTO DIFF WBC: CPT

## 2019-07-08 RX ORDER — TRAMADOL HYDROCHLORIDE 50 MG/1
50 TABLET ORAL
Qty: 20 TAB | Refills: 0 | Status: SHIPPED | OUTPATIENT
Start: 2019-07-08 | End: 2019-07-11

## 2019-07-08 RX ADMIN — SODIUM CHLORIDE 500 ML: 900 INJECTION, SOLUTION INTRAVENOUS at 12:27

## 2019-07-08 NOTE — ED PROVIDER NOTES
53-year-old female presents with dehydration and decreased p.o. intake per family she lives with her daughter patient recently seen here 2 days ago for similar discharged home recommended for possible skilled facility placement has not followed up with her doctor since patient has chronic bilateral shoulder pain denies chest pain shortness of breath fevers or chills           Past Medical History:   Diagnosis Date    [facility] ([facility] [address]     Hypercholesteremia     Hypertension     Multifocal atrial tachycardia ([facility] [address] 09/2018       Past Surgical History:   Procedure Laterality Date    HX CARPAL TUNNEL RELEASE      HX ORTHOPAEDIC  april 2013    HX OTHER SURGICAL      left arm nerve surgery         Family History:   Problem Relation Age of Onset    Diabetes Other     Hypertension Other        Social History     Socioeconomic History    Marital status:      Spouse name: Not on file    Number of children: Not on file    Years of education: Not on file    Highest education level: Not on file   Occupational History    Not on file   Social Needs    Financial resource strain: Not on file    Food insecurity:     Worry: Not on file     Inability: Not on file    Transportation needs:     Medical: Not on file     Non-medical: Not on file   Tobacco Use    Smoking status: Never Smoker    Smokeless tobacco: Never Used   Substance and Sexual Activity    Alcohol use:  Yes    Drug use: No    Sexual activity: Not on file   Lifestyle    Physical activity:     Days per week: Not on file     Minutes per session: Not on file    Stress: Not on file   Relationships    Social connections:     Talks on phone: Not on file     Gets together: Not on file     Attends Orthodox service: Not on file     Active member of club or organization: Not on file     Attends meetings of clubs or organizations: Not on file     Relationship status: Not on file    Intimate partner violence:     Fear of current or ex partner: Not on file Emotionally abused: Not on file     Physically abused: Not on file     Forced sexual activity: Not on file   Other Topics Concern    Not on file   Social History Narrative    Not on file         ALLERGIES: Patient has no known allergies. Review of Systems   Constitutional: Negative for chills and fever. HENT: Negative for sore throat. Respiratory: Negative for shortness of breath. Cardiovascular: Negative for chest pain. Gastrointestinal: Negative for abdominal pain. Genitourinary: Negative for dysuria. Skin: Negative for rash. All other systems reviewed and are negative. Vitals:    07/08/19 1056 07/08/19 1100   BP: (!) 197/92 152/78   Pulse: 98 96   Resp: 20 22   Temp: 97.6 °F (36.4 °C)    SpO2: 98%    Weight: 74.4 kg (164 lb)    Height: 5' 1\" (1.549 m)             Physical Exam   Constitutional: She appears well-developed and well-nourished. No distress. HENT:   Head: Normocephalic. Eyes: EOM are normal.   Neck: Normal range of motion. Cardiovascular: Normal rate and regular rhythm. Pulmonary/Chest: Effort normal and breath sounds normal.   Abdominal: Soft. Bowel sounds are normal.   Musculoskeletal: Normal range of motion. Patient is able to range both shoulders however with pain passive and active range distal neurovascular is intact   Skin: Skin is warm. She is not diaphoretic. Psychiatric: She has a normal mood and affect. Nursing note and vitals reviewed.        MDM  Number of Diagnoses or Management Options  Dehydration:   Pain of both shoulder joints:   Diagnosis management comments: Discussed with daughter who lives with patient may concern at this time is failure to thrive decreased oral intake than weakness not acting right for the past week fleeting headache as well will check appropriate blood work CT urinalysis IV fluids may need admission for further evaluation and management    Dehydration favor IV fluids given rest blood work unremarkable including urinalysis and CAT scan EKG without abnormality discussed with internal medicine likely Medicare will not cover admission we will discuss with  for possible rehab placement this was discussed with the family they understand    Discussed with  will prescribe Ultram follow-up with her primary care provider force fluids at home         Procedures

## 2019-07-08 NOTE — ED TRIAGE NOTES
Alert female arrives to ED by EMS with c/o \"pain all over\", neck pain, bilat arm pain, leg pain. Pt reports has not been taking medications over past few days, hypertensive upon arrival to ED. Denies nvd denies sob. Pain 10/10 sharp, constant. Pt evaluated in ED last noc for same, states no change in sx since last noc.

## 2019-07-08 NOTE — ED NOTES
I have reviewed discharge instructions with the patient. The patient verbalized understanding. Patient NAD, VSS. No pain intervention done in ED and pain is 8/10 at discharge. Patient armband removed and shredded.

## 2024-10-18 NOTE — PROGRESS NOTES
1310  Referral received from ED MD to assist with discharge plan. Pt came to ED with c/o \"pain all over. \" Pt was seen here 2 days ago with same complaint and with adult failure to thrive and ED MD recommended SNF placement with PCP assistance. Chart reviewed. Met with pt and daughter, Shauna Ribera, at bedside. Pt agreed for daughter to be present during interview. Daughter lives with pt. Daughter works 4-5 hrs a day. Pt ambulates with walker. She states that she's independent with ADLs. Confirmed PCP, Dr Calvin Gonsalves and daughter states that they cancelled last appointment and has not rescheduled. Pt and daughter agreed for me to assist.    [id number] [facility] PCP office and scheduled follow up appointment tomorrow 7/9/19 at 1000 and spoke with Soha Soto. Requested to order Home Health after seen by PCP and Soha Smoker states \"yes. \" She requested pt information fax to office [phone].    [phone] Explained to pt and daughter that PCP needs information from her visit in ED and agreed. Daughter signed consent for release of information. Pt information faxed and [place] from PCP office confirmed receipt. Diagnosis:   1. 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 Malignant neoplasm of upper lobe of left lung  (CMD)    3. Hypomagnesemia       Regimen: Keytruda D1, Abraxane D1, D8,D15, Carboplatin D1,   Cycle/Day: C4 D15    Dr. Randhawa is ordering clinician today.    Vital Signs:  ONC OP Encounter Vitals  BP: 104/68  Heart Rate: 85  Resp: 16  Temp: 97.7 °F (36.5 °C)  SpO2: 99 %  Weight: 79.5 kg (175 lb 4.3 oz)      Allergies:  ALLERGIES:  No Known Allergies     Medications:  The medication list was reviewed. No changes noted.     ECOG: ECOG Performance Status: 2    Distress Screening: Is this day one of cycle or a new regimen? No No, patient did not indicate any new concerns. Refer to most recent PHQ2/9 score    Toxicity Assessment:   Adverse Events?  Adverse Events: No    Auditory/Ear  Assessment: Yes (Within Defined Limits)    Cardiac General  Assessment: Yes (Within Defined Limits)    Dermatology/Skin  Assessment: Yes (w/ Exceptions to WDL)  Nail Discoloration: Grade 1    Constitutional  Assessment: Yes (w/ Exceptions to WDL)  Fatigue: Grade 1    Endocrine  Assessment: Yes (Within Defined Limits)    Gastrointestinal  Assessment: Yes (Within Defined Limits)    Hemorrhage/Bleeding  Assessment: Yes (Within Defined Limits)    Infection  Assessment: Yes (Within Defined Limits)    Lymphatics  Assessment: Yes (Within Defined Limits)    Musculoskeletal  Assessment: Yes (w/ Exceptions to WDL)  Generalized Muscle Weakness: Grade 2    Neurology  Assessment: Yes (Within Defined Limits)    Ocular  Assessment: Yes (Within Defined Limits)    Pain  Assessment: Yes (w/ Exceptions to WDL)  Pain: Grade 1    Pulmonary/Upper Respiratory  Assessment: Yes (Within Defined Limits)  Cough: None Present    Genitourinary  Assessment: Yes (w/ Exceptions to WDL)  Urinary Incontinence: None Present  Urinary Frequency: Grade 1  Urinary Discoloration: None Present      Additional Nursing Assessment: In addition to above  toxicity assessment, this RN assessed pain .       Prechemo Checklist  Chemo Consent Signed: Yes  Protocol Verified: Yes  Is Protocol Standard of Care or Research?: Standard of Care  Pre-Chemo Labs Reviewed?: Yes  Provider Notified of Abnormal Labs Not Meeting Treatment Conditions: N/A  Pregnancy Screening Performed (if indicated): Not applicable  All Treatment Conditions Met?: Yes  BSA/weight in Orders Verified for Weight-Based Drugs?: Yes  Chemo Dose Calculations Verified: Yes  Second RN Verified Calculations: YING Rinaldi      Pre-Treatment: Patient has valid pre-authorization, Premed orders, including hydration, are verified prior to administration, and I have reviewed the following with the patient: Name of chemo drug, duration and route of infusion, Infusion/Drug volume and dose, and reportable infusion-related symptoms.     Treatment: Refer to [facility] and MAR for line assessment and medication administration, Chemotherapy has not ; double checked & verified by two practitioners, Appearance and physical integrity of drugs meets standard of drug monograph; double checked & verified by two practitioners, Rate set on infusion pump is in alignment with ordered rate; double checked & verified by two practitioners, Drugs were administered in proper sequencing, Blood return confirmed before, during and after treatment administered, and Infusion pump used for non-vesicant drugs    Post Treatment: Treatment tolerated well; no adverse reaction    Oral Chemotherapy: No.    Education: No new instructions needed    Next appointment scheduled: pt will call us back to schedule.  Patient instructed to call the office with any questions or concerns.    Patient Discharged: per wheelchair, to home